# Patient Record
Sex: MALE | Race: WHITE | Employment: OTHER | URBAN - METROPOLITAN AREA
[De-identification: names, ages, dates, MRNs, and addresses within clinical notes are randomized per-mention and may not be internally consistent; named-entity substitution may affect disease eponyms.]

---

## 2017-02-03 PROBLEM — J01.00 ACUTE NON-RECURRENT MAXILLARY SINUSITIS: Status: ACTIVE | Noted: 2017-02-03

## 2017-05-02 ENCOUNTER — HOSPITAL ENCOUNTER (OUTPATIENT)
Dept: ULTRASOUND IMAGING | Age: 72
Discharge: HOME | End: 2017-05-02
Attending: INTERNAL MEDICINE | Admitting: INTERNAL MEDICINE

## 2017-05-02 DIAGNOSIS — F17.219 NICOTINE DEPENDENCE, CIGARETTES, WITH UNSPECIFIED NICOTINE-INDUCED DISORDERS: ICD-10-CM

## 2017-05-02 DIAGNOSIS — F17.219 CIGARETTE NICOTINE DEPENDENCE WITH NICOTINE-INDUCED DISORDER: ICD-10-CM

## 2017-05-04 ENCOUNTER — TELEPHONE (OUTPATIENT)
Dept: CASE MANAGEMENT | Age: 72
End: 2017-05-04

## 2017-06-02 PROBLEM — Z72.0 TOBACCO ABUSE: Status: ACTIVE | Noted: 2017-06-02

## 2017-08-01 PROBLEM — K59.04 CHRONIC IDIOPATHIC CONSTIPATION: Status: ACTIVE | Noted: 2017-08-01

## 2017-11-02 ENCOUNTER — HOSPITAL ENCOUNTER (OUTPATIENT)
Dept: OTHER | Age: 72
Discharge: HOME | End: 2017-11-02
Attending: INTERNAL MEDICINE | Admitting: INTERNAL MEDICINE

## 2017-11-02 PROBLEM — R35.0 BENIGN PROSTATIC HYPERPLASIA WITH URINARY FREQUENCY: Status: ACTIVE | Noted: 2017-11-02

## 2017-11-02 PROBLEM — N40.1 BENIGN PROSTATIC HYPERPLASIA WITH URINARY FREQUENCY: Status: ACTIVE | Noted: 2017-11-02

## 2017-11-02 LAB
A/G RATIO: 1.6 (ref 1.1–2.2)
ALBUMIN SERPL-MCNC: 3.9 G/DL (ref 3.4–5)
ALP BLD-CCNC: 69 U/L (ref 40–129)
ALT SERPL-CCNC: 14 U/L (ref 10–40)
ANION GAP SERPL CALCULATED.3IONS-SCNC: 11 MMOL/L (ref 3–16)
AST SERPL-CCNC: 11 U/L (ref 15–37)
BILIRUB SERPL-MCNC: 0.8 MG/DL (ref 0–1)
BUN BLDV-MCNC: 21 MG/DL (ref 7–20)
CALCIUM SERPL-MCNC: 8.9 MG/DL (ref 8.3–10.6)
CHLORIDE BLD-SCNC: 106 MMOL/L (ref 99–110)
CHOLESTEROL, TOTAL: 173 MG/DL (ref 0–199)
CO2: 25 MMOL/L (ref 21–32)
CREAT SERPL-MCNC: 0.8 MG/DL (ref 0.8–1.3)
GFR AFRICAN AMERICAN: >60
GFR NON-AFRICAN AMERICAN: >60
GLOBULIN: 2.4 G/DL
GLUCOSE BLD-MCNC: 102 MG/DL (ref 70–99)
HCT VFR BLD CALC: 44.7 % (ref 40.5–52.5)
HDLC SERPL-MCNC: 38 MG/DL (ref 40–60)
HEMOGLOBIN: 15 G/DL (ref 13.5–17.5)
LDL CHOLESTEROL CALCULATED: 95 MG/DL
MCH RBC QN AUTO: 31 PG (ref 26–34)
MCHC RBC AUTO-ENTMCNC: 33.6 G/DL (ref 31–36)
MCV RBC AUTO: 92.2 FL (ref 80–100)
PDW BLD-RTO: 13.1 % (ref 12.4–15.4)
PLATELET # BLD: 180 K/UL (ref 135–450)
PMV BLD AUTO: 10.3 FL (ref 5–10.5)
POTASSIUM SERPL-SCNC: 4.5 MMOL/L (ref 3.5–5.1)
PROSTATE SPECIFIC ANTIGEN: 0.61 NG/ML (ref 0–4)
RBC # BLD: 4.84 M/UL (ref 4.2–5.9)
SODIUM BLD-SCNC: 142 MMOL/L (ref 136–145)
TOTAL PROTEIN: 6.3 G/DL (ref 6.4–8.2)
TRIGL SERPL-MCNC: 202 MG/DL (ref 0–150)
VLDLC SERPL CALC-MCNC: 40 MG/DL
WBC # BLD: 12.5 K/UL (ref 4–11)

## 2018-02-06 PROBLEM — N32.81 OVERACTIVE BLADDER: Status: ACTIVE | Noted: 2018-02-06

## 2018-08-07 PROBLEM — F32.9 REACTIVE DEPRESSION: Status: ACTIVE | Noted: 2018-08-07

## 2018-11-02 PROBLEM — F32.4 MAJOR DEPRESSIVE DISORDER WITH SINGLE EPISODE, IN PARTIAL REMISSION (HCC): Status: ACTIVE | Noted: 2018-11-02

## 2019-06-06 ENCOUNTER — HOSPITAL ENCOUNTER (OUTPATIENT)
Dept: MRI IMAGING | Age: 74
Discharge: HOME | End: 2019-06-06
Payer: MEDICARE

## 2019-06-06 DIAGNOSIS — M54.17 LUMBOSACRAL RADICULOPATHY: ICD-10-CM

## 2019-06-06 DIAGNOSIS — M51.35 DDD (DEGENERATIVE DISC DISEASE), THORACOLUMBAR: ICD-10-CM

## 2019-06-06 PROCEDURE — 72148 MRI LUMBAR SPINE W/O DYE: CPT

## 2019-06-11 ENCOUNTER — TELEPHONE (OUTPATIENT)
Dept: INTERNAL MEDICINE CLINIC | Age: 74
End: 2019-06-11

## 2019-06-11 NOTE — TELEPHONE ENCOUNTER
This is a referral to Dr Allen Olvera for Pain management , MRI results faxed already   Dx  Chronic spondyloarthropathy and disc lesions   Please eval and treat  patiant not fond of Narcotics

## 2019-07-16 ENCOUNTER — HOSPITAL ENCOUNTER (OUTPATIENT)
Dept: GENERAL RADIOLOGY | Age: 74
Discharge: HOME | End: 2019-07-16
Payer: MEDICARE

## 2019-07-16 ENCOUNTER — HOSPITAL ENCOUNTER (OUTPATIENT)
Age: 74
Discharge: HOME | End: 2019-07-16
Payer: MEDICARE

## 2019-07-16 DIAGNOSIS — M54.5 LOW BACK PAIN, UNSPECIFIED BACK PAIN LATERALITY, UNSPECIFIED CHRONICITY, WITH SCIATICA PRESENCE UNSPECIFIED: ICD-10-CM

## 2019-07-16 DIAGNOSIS — M48.062 LUMBAR STENOSIS WITH NEUROGENIC CLAUDICATION: ICD-10-CM

## 2019-07-16 PROCEDURE — 72110 X-RAY EXAM L-2 SPINE 4/>VWS: CPT

## 2019-07-18 ENCOUNTER — HOSPITAL ENCOUNTER (OUTPATIENT)
Dept: MRI IMAGING | Age: 74
Discharge: HOME | End: 2019-07-18
Payer: MEDICARE

## 2019-07-18 DIAGNOSIS — M54.6 PAIN IN THORACIC SPINE: ICD-10-CM

## 2019-07-18 DIAGNOSIS — M48.062 LUMBAR STENOSIS WITH NEUROGENIC CLAUDICATION: ICD-10-CM

## 2019-07-18 PROCEDURE — 72146 MRI CHEST SPINE W/O DYE: CPT

## 2020-09-21 ENCOUNTER — HOSPITAL ENCOUNTER (INPATIENT)
Age: 75
LOS: 2 days | Discharge: HOME | DRG: 287 | End: 2020-09-23
Attending: EMERGENCY MEDICINE | Admitting: INTERNAL MEDICINE
Payer: MEDICARE

## 2020-09-21 ENCOUNTER — HOSPITAL ENCOUNTER (OUTPATIENT)
Age: 75
Discharge: HOME | DRG: 287 | End: 2020-09-21
Payer: MEDICARE

## 2020-09-21 ENCOUNTER — APPOINTMENT (OUTPATIENT)
Dept: GENERAL RADIOLOGY | Age: 75
DRG: 287 | End: 2020-09-21
Payer: MEDICARE

## 2020-09-21 PROBLEM — J01.00 ACUTE NON-RECURRENT MAXILLARY SINUSITIS: Status: RESOLVED | Noted: 2017-02-03 | Resolved: 2020-09-21

## 2020-09-21 PROBLEM — I48.91 ATRIAL FIBRILLATION WITH RAPID VENTRICULAR RESPONSE (HCC): Status: ACTIVE | Noted: 2020-09-21

## 2020-09-21 LAB
A/G RATIO: 1.5 (ref 1.1–2.2)
A/G RATIO: 1.9 (ref 1.1–2.2)
ALBUMIN SERPL-MCNC: 3.6 G/DL (ref 3.4–5)
ALBUMIN SERPL-MCNC: 3.9 G/DL (ref 3.4–5)
ALP BLD-CCNC: 78 U/L (ref 40–129)
ALP BLD-CCNC: 81 U/L (ref 40–129)
ALT SERPL-CCNC: 11 U/L (ref 10–40)
ALT SERPL-CCNC: 14 U/L (ref 10–40)
ANION GAP SERPL CALCULATED.3IONS-SCNC: 13 MMOL/L (ref 3–16)
ANION GAP SERPL CALCULATED.3IONS-SCNC: 9 MMOL/L (ref 3–16)
AST SERPL-CCNC: 16 U/L (ref 15–37)
AST SERPL-CCNC: 20 U/L (ref 15–37)
BASOPHILS ABSOLUTE: 0 K/UL (ref 0–0.2)
BASOPHILS RELATIVE PERCENT: 0 %
BILIRUB SERPL-MCNC: 1.1 MG/DL (ref 0–1)
BILIRUB SERPL-MCNC: 1.2 MG/DL (ref 0–1)
BILIRUBIN URINE: NEGATIVE
BLOOD, URINE: ABNORMAL
BUN BLDV-MCNC: 17 MG/DL (ref 7–20)
BUN BLDV-MCNC: 18 MG/DL (ref 7–20)
CALCIUM SERPL-MCNC: 8.8 MG/DL (ref 8.3–10.6)
CALCIUM SERPL-MCNC: 8.9 MG/DL (ref 8.3–10.6)
CHLORIDE BLD-SCNC: 106 MMOL/L (ref 99–110)
CHLORIDE BLD-SCNC: 106 MMOL/L (ref 99–110)
CLARITY: CLEAR
CO2: 21 MMOL/L (ref 21–32)
CO2: 22 MMOL/L (ref 21–32)
COLOR: YELLOW
CREAT SERPL-MCNC: 0.8 MG/DL (ref 0.8–1.3)
CREAT SERPL-MCNC: 0.9 MG/DL (ref 0.8–1.3)
EKG ATRIAL RATE: 144 BPM
EKG ATRIAL RATE: 150 BPM
EKG DIAGNOSIS: NORMAL
EKG DIAGNOSIS: NORMAL
EKG Q-T INTERVAL: 280 MS
EKG Q-T INTERVAL: 312 MS
EKG QRS DURATION: 92 MS
EKG QRS DURATION: 96 MS
EKG QTC CALCULATION (BAZETT): 418 MS
EKG QTC CALCULATION (BAZETT): 492 MS
EKG R AXIS: 10 DEGREES
EKG R AXIS: 9 DEGREES
EKG T AXIS: 15 DEGREES
EKG T AXIS: 39 DEGREES
EKG VENTRICULAR RATE: 134 BPM
EKG VENTRICULAR RATE: 150 BPM
EOSINOPHILS ABSOLUTE: 0 K/UL (ref 0–0.6)
EOSINOPHILS RELATIVE PERCENT: 0 %
EPITHELIAL CELLS, UA: 0 /HPF (ref 0–5)
GFR AFRICAN AMERICAN: >60
GFR AFRICAN AMERICAN: >60
GFR NON-AFRICAN AMERICAN: >60
GFR NON-AFRICAN AMERICAN: >60
GLOBULIN: 2.1 G/DL
GLOBULIN: 2.4 G/DL
GLUCOSE BLD-MCNC: 89 MG/DL (ref 70–99)
GLUCOSE BLD-MCNC: 93 MG/DL (ref 70–99)
GLUCOSE URINE: NEGATIVE MG/DL
HCT VFR BLD CALC: 41.2 % (ref 40.5–52.5)
HCT VFR BLD CALC: 41.4 % (ref 40.5–52.5)
HEMATOLOGY PATH CONSULT: YES
HEMOGLOBIN: 14 G/DL (ref 13.5–17.5)
HEMOGLOBIN: 14 G/DL (ref 13.5–17.5)
HYALINE CASTS: 0 /LPF (ref 0–8)
KETONES, URINE: NEGATIVE MG/DL
LACTIC ACID, SEPSIS: 0.8 MMOL/L (ref 0.4–1.9)
LEUKOCYTE ESTERASE, URINE: ABNORMAL
LYMPHOCYTES ABSOLUTE: 5.6 K/UL (ref 1–5.1)
LYMPHOCYTES RELATIVE PERCENT: 49 %
MCH RBC QN AUTO: 30.5 PG (ref 26–34)
MCH RBC QN AUTO: 30.9 PG (ref 26–34)
MCHC RBC AUTO-ENTMCNC: 33.8 G/DL (ref 31–36)
MCHC RBC AUTO-ENTMCNC: 34 G/DL (ref 31–36)
MCV RBC AUTO: 90.3 FL (ref 80–100)
MCV RBC AUTO: 91.1 FL (ref 80–100)
MICROSCOPIC EXAMINATION: YES
MONOCYTES ABSOLUTE: 1 K/UL (ref 0–1.3)
MONOCYTES RELATIVE PERCENT: 9 %
NEUTROPHILS ABSOLUTE: 4.8 K/UL (ref 1.7–7.7)
NEUTROPHILS RELATIVE PERCENT: 42 %
NITRITE, URINE: NEGATIVE
PDW BLD-RTO: 13.6 % (ref 12.4–15.4)
PDW BLD-RTO: 13.8 % (ref 12.4–15.4)
PH UA: 6.5 (ref 5–8)
PLATELET # BLD: 190 K/UL (ref 135–450)
PLATELET # BLD: 199 K/UL (ref 135–450)
PLATELET SLIDE REVIEW: ADEQUATE
PMV BLD AUTO: 9.1 FL (ref 5–10.5)
PMV BLD AUTO: 9.9 FL (ref 5–10.5)
POTASSIUM REFLEX MAGNESIUM: 4.5 MMOL/L (ref 3.5–5.1)
POTASSIUM SERPL-SCNC: 3.9 MMOL/L (ref 3.5–5.1)
PRO-BNP: 475 PG/ML (ref 0–449)
PROSTATE SPECIFIC ANTIGEN: 0.43 NG/ML (ref 0–4)
PROTEIN UA: NEGATIVE MG/DL
RBC # BLD: 4.53 M/UL (ref 4.2–5.9)
RBC # BLD: 4.59 M/UL (ref 4.2–5.9)
RBC # BLD: NORMAL 10*6/UL
RBC UA: 2 /HPF (ref 0–4)
SLIDE REVIEW: ABNORMAL
SODIUM BLD-SCNC: 137 MMOL/L (ref 136–145)
SODIUM BLD-SCNC: 140 MMOL/L (ref 136–145)
SPECIFIC GRAVITY UA: 1.02 (ref 1–1.03)
TOTAL PROTEIN: 6 G/DL (ref 6.4–8.2)
TOTAL PROTEIN: 6 G/DL (ref 6.4–8.2)
TROPONIN: <0.01 NG/ML
URINE REFLEX TO CULTURE: ABNORMAL
URINE TYPE: ABNORMAL
UROBILINOGEN, URINE: 0.2 E.U./DL
WBC # BLD: 10.7 K/UL (ref 4–11)
WBC # BLD: 11.5 K/UL (ref 4–11)
WBC UA: 2 /HPF (ref 0–5)

## 2020-09-21 PROCEDURE — 83605 ASSAY OF LACTIC ACID: CPT

## 2020-09-21 PROCEDURE — 93010 ELECTROCARDIOGRAM REPORT: CPT | Performed by: INTERNAL MEDICINE

## 2020-09-21 PROCEDURE — 1200000000 HC SEMI PRIVATE

## 2020-09-21 PROCEDURE — 93005 ELECTROCARDIOGRAM TRACING: CPT | Performed by: EMERGENCY MEDICINE

## 2020-09-21 PROCEDURE — 83880 ASSAY OF NATRIURETIC PEPTIDE: CPT

## 2020-09-21 PROCEDURE — 84153 ASSAY OF PSA TOTAL: CPT

## 2020-09-21 PROCEDURE — 6360000002 HC RX W HCPCS: Performed by: INTERNAL MEDICINE

## 2020-09-21 PROCEDURE — 2500000003 HC RX 250 WO HCPCS: Performed by: PHYSICIAN ASSISTANT

## 2020-09-21 PROCEDURE — 80053 COMPREHEN METABOLIC PANEL: CPT

## 2020-09-21 PROCEDURE — 2580000003 HC RX 258: Performed by: PHYSICIAN ASSISTANT

## 2020-09-21 PROCEDURE — 84484 ASSAY OF TROPONIN QUANT: CPT

## 2020-09-21 PROCEDURE — 2580000003 HC RX 258

## 2020-09-21 PROCEDURE — 2500000003 HC RX 250 WO HCPCS

## 2020-09-21 PROCEDURE — 6360000002 HC RX W HCPCS

## 2020-09-21 PROCEDURE — 6370000000 HC RX 637 (ALT 250 FOR IP): Performed by: INTERNAL MEDICINE

## 2020-09-21 PROCEDURE — 99285 EMERGENCY DEPT VISIT HI MDM: CPT

## 2020-09-21 PROCEDURE — 96374 THER/PROPH/DIAG INJ IV PUSH: CPT

## 2020-09-21 PROCEDURE — 2500000003 HC RX 250 WO HCPCS: Performed by: INTERNAL MEDICINE

## 2020-09-21 PROCEDURE — 85025 COMPLETE CBC W/AUTO DIFF WBC: CPT

## 2020-09-21 PROCEDURE — 93005 ELECTROCARDIOGRAM TRACING: CPT

## 2020-09-21 PROCEDURE — 85027 COMPLETE CBC AUTOMATED: CPT

## 2020-09-21 PROCEDURE — 36415 COLL VENOUS BLD VENIPUNCTURE: CPT

## 2020-09-21 PROCEDURE — 2580000003 HC RX 258: Performed by: INTERNAL MEDICINE

## 2020-09-21 PROCEDURE — 71045 X-RAY EXAM CHEST 1 VIEW: CPT

## 2020-09-21 PROCEDURE — 81001 URINALYSIS AUTO W/SCOPE: CPT

## 2020-09-21 RX ORDER — DILTIAZEM HYDROCHLORIDE 5 MG/ML
10 INJECTION INTRAVENOUS ONCE
Status: COMPLETED | OUTPATIENT
Start: 2020-09-21 | End: 2020-09-21

## 2020-09-21 RX ORDER — ATORVASTATIN CALCIUM 10 MG/1
5 TABLET, FILM COATED ORAL NIGHTLY
Status: DISCONTINUED | OUTPATIENT
Start: 2020-09-21 | End: 2020-09-23 | Stop reason: HOSPADM

## 2020-09-21 RX ORDER — DOXAZOSIN MESYLATE 1 MG/1
2 TABLET ORAL DAILY
Status: DISCONTINUED | OUTPATIENT
Start: 2020-09-22 | End: 2020-09-21

## 2020-09-21 RX ORDER — TAMSULOSIN HYDROCHLORIDE 0.4 MG/1
0.4 CAPSULE ORAL DAILY
Status: DISCONTINUED | OUTPATIENT
Start: 2020-09-22 | End: 2020-09-23 | Stop reason: HOSPADM

## 2020-09-21 RX ORDER — ASPIRIN 81 MG/1
81 TABLET ORAL DAILY
COMMUNITY
End: 2020-10-20 | Stop reason: SDUPTHER

## 2020-09-21 RX ORDER — DOXAZOSIN MESYLATE 1 MG/1
2 TABLET ORAL NIGHTLY
Status: DISCONTINUED | OUTPATIENT
Start: 2020-09-21 | End: 2020-09-23 | Stop reason: HOSPADM

## 2020-09-21 RX ORDER — 0.9 % SODIUM CHLORIDE 0.9 %
500 INTRAVENOUS SOLUTION INTRAVENOUS ONCE
Status: COMPLETED | OUTPATIENT
Start: 2020-09-21 | End: 2020-09-21

## 2020-09-21 RX ORDER — GABAPENTIN 300 MG/1
300 CAPSULE ORAL 2 TIMES DAILY
Status: DISCONTINUED | OUTPATIENT
Start: 2020-09-21 | End: 2020-09-23 | Stop reason: HOSPADM

## 2020-09-21 RX ORDER — CITALOPRAM 20 MG/1
40 TABLET ORAL DAILY
Status: DISCONTINUED | OUTPATIENT
Start: 2020-09-22 | End: 2020-09-21

## 2020-09-21 RX ORDER — SODIUM CHLORIDE 9 MG/ML
INJECTION, SOLUTION INTRAVENOUS CONTINUOUS
Status: DISCONTINUED | OUTPATIENT
Start: 2020-09-21 | End: 2020-09-22

## 2020-09-21 RX ORDER — ASPIRIN 81 MG/1
81 TABLET ORAL DAILY
Status: DISCONTINUED | OUTPATIENT
Start: 2020-09-22 | End: 2020-09-23 | Stop reason: HOSPADM

## 2020-09-21 RX ORDER — CITALOPRAM 20 MG/1
40 TABLET ORAL NIGHTLY
Status: DISCONTINUED | OUTPATIENT
Start: 2020-09-21 | End: 2020-09-23 | Stop reason: HOSPADM

## 2020-09-21 RX ORDER — FINASTERIDE 5 MG/1
5 TABLET, FILM COATED ORAL DAILY
Status: DISCONTINUED | OUTPATIENT
Start: 2020-09-22 | End: 2020-09-21

## 2020-09-21 RX ORDER — DILTIAZEM HYDROCHLORIDE 5 MG/ML
10 INJECTION INTRAVENOUS ONCE
Status: DISCONTINUED | OUTPATIENT
Start: 2020-09-21 | End: 2020-09-23 | Stop reason: HOSPADM

## 2020-09-21 RX ORDER — FINASTERIDE 5 MG/1
5 TABLET, FILM COATED ORAL NIGHTLY
Status: DISCONTINUED | OUTPATIENT
Start: 2020-09-21 | End: 2020-09-23 | Stop reason: HOSPADM

## 2020-09-21 RX ADMIN — DILTIAZEM HYDROCHLORIDE 5 MG/HR: 5 INJECTION INTRAVENOUS at 22:47

## 2020-09-21 RX ADMIN — DILTIAZEM HYDROCHLORIDE 10 MG: 5 INJECTION INTRAVENOUS at 15:36

## 2020-09-21 RX ADMIN — ATORVASTATIN CALCIUM 5 MG: 10 TABLET, FILM COATED ORAL at 22:41

## 2020-09-21 RX ADMIN — FINASTERIDE 5 MG: 5 TABLET, FILM COATED ORAL at 23:28

## 2020-09-21 RX ADMIN — DOXAZOSIN 2 MG: 1 TABLET ORAL at 23:29

## 2020-09-21 RX ADMIN — GABAPENTIN 300 MG: 300 CAPSULE ORAL at 22:40

## 2020-09-21 RX ADMIN — ENOXAPARIN SODIUM 80 MG: 80 INJECTION SUBCUTANEOUS at 22:41

## 2020-09-21 RX ADMIN — CITALOPRAM HYDROBROMIDE 40 MG: 20 TABLET ORAL at 23:28

## 2020-09-21 RX ADMIN — DILTIAZEM HYDROCHLORIDE 5 MG/HR: 5 INJECTION INTRAVENOUS at 16:40

## 2020-09-21 RX ADMIN — SODIUM CHLORIDE 500 ML: 9 INJECTION, SOLUTION INTRAVENOUS at 16:43

## 2020-09-21 RX ADMIN — SODIUM CHLORIDE: 9 INJECTION, SOLUTION INTRAVENOUS at 22:41

## 2020-09-21 ASSESSMENT — ENCOUNTER SYMPTOMS
SHORTNESS OF BREATH: 0
VOMITING: 0
NAUSEA: 0
ABDOMINAL PAIN: 0
DIARRHEA: 0
CHEST TIGHTNESS: 0

## 2020-09-21 ASSESSMENT — PAIN SCALES - GENERAL
PAINLEVEL_OUTOF10: 0

## 2020-09-21 NOTE — ED NOTES
Pt alert and oriented, Pt to ER with c/o possible new onset AFIB RVR, pt states has been dealing with his for approx a month, states has been under a lot of stress with home life, and has had a couple urinary surgeries. heart rate irregular, S1, S2 heard. Cap refill less than three seconds, radial pulse 2+, no signs of edema or JVD and lungs sounds clear. Pt denies pain at this time, states \"I just feel a pressure on my chest.\" denies SOB. Pt denies cardiac hx.  Pt denies any other problems or needs at this time     Conrado Ames, RN  09/21/20 2468

## 2020-09-21 NOTE — ED NOTES
Pt states still having periods of CP, states comes and goes. States does not feel like heart is racing anymore. Patient resting comfortably with no signs of distress. Denies any needs at this time. Bed locked and in lowest position with both side rails raised. Call light within reach.      Iman Alarcon RN  09/21/20 1565

## 2020-09-21 NOTE — ED NOTES
IV inserted. Blood collected. Pt given urinal. Patient resting comfortably with no signs of distress. Denies any needs at this time. Bed locked and in lowest position with both side rails raised. Call light within reach. Friend at bedside.      Luli Cole RN  09/21/20 4756

## 2020-09-21 NOTE — ED NOTES
Pt medicated per orders. Updated on plan of care by Dr Nury Spain at bedside. Patient resting comfortably with no signs of distress. Denies any needs at this time. Bed locked and in lowest position with both side rails raised. Call light within reach.  Patient brought warm blanket at patient's request.     Melissa Judd RN  09/21/20 6688

## 2020-09-21 NOTE — ED NOTES
pts BP high 56'F low 485'U systolic. teodora Mcwilliams requesting dilt drip started. States will place order for 500ml bolus momentarily.       Travis Verdugo RN  09/21/20 9321

## 2020-09-21 NOTE — ED PROVIDER NOTES
ago.  He denies abdominal or flank pain. Denies dysuria, urgency, frequency, hesitancy or incontinence. Denies fevers and or chills. Denies nausea vomiting or diarrhea. Patient has no other associated complaint voiced at the present time. It is with the above-mentioned he sent by Dr. Christine Edward for evaluation and treatment. Nursing Notes were all reviewed and agreed with or any disagreements were addressed in the HPI. REVIEW OF SYSTEMS    (2-9 systems for level 4, 10 or more for level 5)     Review of Systems   Constitutional: Negative for activity change, chills and fever. Respiratory: Negative for chest tightness and shortness of breath. Cardiovascular: Negative for chest pain. Gastrointestinal: Negative for abdominal pain, diarrhea, nausea and vomiting. Genitourinary: Negative for dysuria and flank pain. Skin: Negative for rash and wound. Neurological: Positive for weakness. Positives and Pertinent negatives as per HPI. Except as noted above in the ROS, all other systems were reviewed and negative. PAST MEDICAL HISTORY     Past Medical History:   Diagnosis Date    Hypercholesteremia     Hypertension          SURGICAL HISTORY     Past Surgical History:   Procedure Laterality Date    HEMORRHOID SURGERY      SHOULDER SURGERY Right     RTC          CURRENTMEDICATIONS       Previous Medications    ATORVASTATIN (LIPITOR) 10 MG TABLET    Take 0.5 tablets by mouth daily    CITALOPRAM (CELEXA) 40 MG TABLET    Take 1 tablet by mouth daily    DOXAZOSIN (CARDURA) 2 MG TABLET    Take 1 tablet by mouth daily    FINASTERIDE (PROSCAR) 5 MG TABLET    Take 1 tablet by mouth daily    GABAPENTIN (NEURONTIN) 300 MG CAPSULE    Take 1 capsule by mouth 2 times daily for 90 days.     IBUPROFEN (ADVIL;MOTRIN) 600 MG TABLET    Take 1 tablet by mouth every 6 hours as needed for Pain    PSEUDOEPHEDRINE-GUAIFENESIN (MUCINEX D MAX STRENGTH) 120-1200 MG TB12    Take 1 tablet by mouth 2 times daily TAMSULOSIN (FLOMAX) 0.4 MG CAPSULE    Take 1 capsule by mouth daily         ALLERGIES     Shellfish allergy and Augmentin [amoxicillin-pot clavulanate]    FAMILYHISTORY       Family History   Problem Relation Age of Onset    Cancer Other     High Blood Pressure Other     Stroke Other           SOCIAL HISTORY       Social History     Tobacco Use    Smoking status: Current Every Day Smoker     Packs/day: 1.00     Types: Cigarettes    Smokeless tobacco: Former User     Quit date: 1/1/2011   Substance Use Topics    Alcohol use: No    Drug use: No       SCREENINGS             PHYSICAL EXAM    (up to 7 for level 4, 8 or more for level 5)     ED Triage Vitals [09/21/20 1435]   BP Temp Temp src Pulse Resp SpO2 Height Weight   113/74 97.6 °F (36.4 °C) -- 100 16 96 % 5' 9\" (1.753 m) 184 lb (83.5 kg)       Physical Exam  Vitals signs and nursing note reviewed. Constitutional:       General: He is awake. He is not in acute distress. Appearance: Normal appearance. He is well-developed. He is not ill-appearing or diaphoretic. HENT:      Head: Normocephalic and atraumatic. Right Ear: External ear normal.      Left Ear: External ear normal.   Eyes:      General: No scleral icterus. Right eye: No discharge. Left eye: No discharge. Conjunctiva/sclera: Conjunctivae normal.   Neck:      Musculoskeletal: Normal range of motion. Vascular: No JVD. Cardiovascular:      Rate and Rhythm: Tachycardia present. Rhythm irregularly irregular. Heart sounds: No murmur. No friction rub. No gallop. Pulmonary:      Effort: Pulmonary effort is normal. No accessory muscle usage or respiratory distress. Breath sounds: Normal breath sounds. No wheezing, rhonchi or rales. Abdominal:      General: There is no distension. Palpations: Abdomen is soft. Abdomen is not rigid. There is no mass. Tenderness: There is no abdominal tenderness. There is no guarding or rebound.    Skin:     General: Laboratory  [address] [facility], [address]   Phone [phone]   MICROSCOPIC URINALYSIS    Narrative:     Performed at:  [facility]  [address] [facility], [address]   Phone [phone]       All other labs were within normal range or not returned as of this dictation. EKG: All EKG's are interpreted by the Emergency Department Physician in the absence of a cardiologist.  Please see their note for interpretation of EKG. RADIOLOGY:   Non-plain film images such as CT, Ultrasound and MRI are read by the radiologist. Plain radiographic images are visualized and preliminarily interpreted by the ED Provider with the below findings:        Interpretation per the Radiologist below, if available at the time of this note:    XR CHEST PORTABLE   Final Result   No evidence of acute cardiopulmonary disease. PROCEDURES   Unless otherwise noted below, none     Procedures    CRITICAL CARE TIME   Because of the high probability of sudden clinical deterioration of the patients condition and to prevent further deterioration, my critical care time involved my full attention to the patients condition, and included chart data review, documentation, medication ordering, viewing the patients old records, reevaluation of the patient's cardiac, pulmonary, and neurological status. Reassessing vital signs. Consutlations with off service physician. Ordering, interpreting reviewing diagnostic testing. Therefore my critical care time was 38 minutes of direct attention to the patients condition and did not include time spent on procedures.       CONSULTS:  IP CONSULT TO INTERNAL MEDICINE      EMERGENCY DEPARTMENT COURSE and DIFFERENTIAL DIAGNOSIS/MDM:   Vitals:    Vitals:    09/21/20 1610 09/21/20 1620 09/21/20 1630 09/21/20 1640   BP: 115/72 121/70 99/84 118/80   Pulse: 123 109 115 136   Resp: 26 23 23 23   Temp:       SpO2: 94% 95% 95% 95%   Weight:       Height: Patient was given the following medications:  Medications   dilTIAZem injection 10 mg (10 mg Intravenous Given 9/21/20 1536)     Followed by   dilTIAZem 125 mg in dextrose 5 % 125 mL infusion (5 mg/hr Intravenous New Bag 9/21/20 1640)   0.9 % sodium chloride bolus (500 mLs Intravenous New Bag 9/21/20 1643)           The patient's detailed history of present illness is documented as above. Upon arrival to the emergency department the patient's vital signs are as documented. The patient is noted to be hemodynamically stable and afebrile. Physical examination findings are as above. IV access was obtained. Laboratory testing and work-up was initiated. Initial EKG performed upon arrival demonstrates atrial fibrillation with a rapid ventricular response with a rate of 134. In the examination room he is bouncing as high as 160s. Cardizem bolus and drip was initiated. Heart rate was trending down nicely. Laboratory testing and work-up was indicated. CBC demonstrates a white blood cell count of 11.5 with no evidence of anemia and/or thrombocytopenia. UN is 17 creatinine is 0.8 nonfasting glucose 89 electrolytes and LFTs are as documented. Total bili at 1.1.  UA demonstrates no evidence of infection. Troponin less than 0.01.  proBNP is 475. Chest x-ray demonstrates no evidence of acute cardiopulmonary process. Patient did have one relatively low blood pressure reading here when the Cardizem drip was initiated. 500 cc bolus had his blood pressure returned normal and nicely. In light of the above mention the patient will require ongoing care management on an inpatient basis. Case was discussed directly with Dr. Lay Drake who accepts the patient for admission for ongoing care management the diagnoses below. FINAL IMPRESSION      1.  Atrial fibrillation with rapid ventricular response (HCC)          DISPOSITION/PLAN   DISPOSITION: Admit medical stable condition         (Please note that portions of this note were completed with a voice recognition program.  Efforts were made to edit the dictations but occasionally words are mis-transcribed.)    Tennie Bernheim, PA-C (electronically signed)           Jany Shultz PA-C  09/21/20 3792

## 2020-09-21 NOTE — ED PROVIDER NOTES
I independently performed a history and physical on Gabriel Baig. All diagnostic, treatment, and disposition decisions were made by myself in conjunction with the advanced practice provider. I have participated in the medical decision making and directed the treatment plan and disposition of the patient. For further details of [facility] emergency department encounter, please see the advanced practice provider's documentation. CHIEF COMPLAINT  Chief Complaint   Patient presents with    Tachycardia     Pt recently had a urology procedure done and they noted bradycardia. Followed up with his PCP and they took him off his metoprolol and now his HR has been high. [place] [place] and EKG showed afib with RVR and sent him here. Briefly, Gabriel Baig is a 77 y.o. male  who presents to the ED complaining of being sent in for rapid heart rate after recently discontinuing metoprolol due to bradycardia during a urologic procedure. No fever. He does have some chest pressure/palpitations sensation. No syncope. Not short of breath at this time. FOCUSED PHYSICAL EXAMINATION  /70   Pulse 113   Temp 97.6 °F (36.4 °C)   Resp 24   Ht 5' 9\" (1.753 m)   Wt 184 lb (83.5 kg)   SpO2 94%   BMI 27.17 kg/m²    Focused physical examination notable for no acute distress, well-appearing, well-nourished, normal speech and mentation without obvious facial droop, no obvious rash. No obvious cranial nerve deficits on my initial exam.  Tachycardic, irregularly irregular rhythm, clear to auscultation bilaterally. No leg swelling.     The 12 lead EKG was interpreted by me as follows:  Rate: tachycardia with a rate of 134  Rhythm: sinus  Axis: normal  Intervals: narrow QRS  ST segments: no ST elevations or depressions  T waves: no abnormal inversions  Non-specific T wave changes: present  Prior EKG comparison: No prior is currently available for comparison    MDM:  ED course was notable for new onset of

## 2020-09-22 LAB
ANION GAP SERPL CALCULATED.3IONS-SCNC: 6 MMOL/L (ref 3–16)
BUN BLDV-MCNC: 15 MG/DL (ref 7–20)
CALCIUM SERPL-MCNC: 8.4 MG/DL (ref 8.3–10.6)
CHLORIDE BLD-SCNC: 108 MMOL/L (ref 99–110)
CO2: 25 MMOL/L (ref 21–32)
CREAT SERPL-MCNC: 0.9 MG/DL (ref 0.8–1.3)
GFR AFRICAN AMERICAN: >60
GFR NON-AFRICAN AMERICAN: >60
GLUCOSE BLD-MCNC: 97 MG/DL (ref 70–99)
HEMATOLOGY PATH CONSULT: NORMAL
INR BLD: 0.99 (ref 0.86–1.14)
LEFT VENTRICULAR EJECTION FRACTION HIGH VALUE: 55 %
LEFT VENTRICULAR EJECTION FRACTION MODE: NORMAL
LEFT VENTRICULAR EJECTION FRACTION MODE: NORMAL
LV EF: 47 %
LV EF: 50 %
LV EF: 55 %
LVEF MODALITY: NORMAL
LVEF MODALITY: NORMAL
POTASSIUM SERPL-SCNC: 4.4 MMOL/L (ref 3.5–5.1)
PROTHROMBIN TIME: 11.5 SEC (ref 10–13.2)
SODIUM BLD-SCNC: 139 MMOL/L (ref 136–145)
TROPONIN: <0.01 NG/ML
TSH REFLEX: 0.97 UIU/ML (ref 0.27–4.2)

## 2020-09-22 PROCEDURE — 93458 L HRT ARTERY/VENTRICLE ANGIO: CPT | Performed by: INTERNAL MEDICINE

## 2020-09-22 PROCEDURE — 4A023N7 MEASUREMENT OF CARDIAC SAMPLING AND PRESSURE, LEFT HEART, PERCUTANEOUS APPROACH: ICD-10-PCS | Performed by: INTERNAL MEDICINE

## 2020-09-22 PROCEDURE — A9502 TC99M TETROFOSMIN: HCPCS | Performed by: INTERNAL MEDICINE

## 2020-09-22 PROCEDURE — 80048 BASIC METABOLIC PNL TOTAL CA: CPT

## 2020-09-22 PROCEDURE — 99153 MOD SED SAME PHYS/QHP EA: CPT

## 2020-09-22 PROCEDURE — 6360000002 HC RX W HCPCS: Performed by: INTERNAL MEDICINE

## 2020-09-22 PROCEDURE — 6360000004 HC RX CONTRAST MEDICATION: Performed by: INTERNAL MEDICINE

## 2020-09-22 PROCEDURE — 85610 PROTHROMBIN TIME: CPT

## 2020-09-22 PROCEDURE — 6370000000 HC RX 637 (ALT 250 FOR IP): Performed by: INTERNAL MEDICINE

## 2020-09-22 PROCEDURE — B2111ZZ FLUOROSCOPY OF MULTIPLE CORONARY ARTERIES USING LOW OSMOLAR CONTRAST: ICD-10-PCS | Performed by: INTERNAL MEDICINE

## 2020-09-22 PROCEDURE — 99222 1ST HOSP IP/OBS MODERATE 55: CPT | Performed by: INTERNAL MEDICINE

## 2020-09-22 PROCEDURE — 94760 N-INVAS EAR/PLS OXIMETRY 1: CPT

## 2020-09-22 PROCEDURE — 84484 ASSAY OF TROPONIN QUANT: CPT

## 2020-09-22 PROCEDURE — 3430000000 HC RX DIAGNOSTIC RADIOPHARMACEUTICAL: Performed by: INTERNAL MEDICINE

## 2020-09-22 PROCEDURE — 99152 MOD SED SAME PHYS/QHP 5/>YRS: CPT

## 2020-09-22 PROCEDURE — C1894 INTRO/SHEATH, NON-LASER: HCPCS

## 2020-09-22 PROCEDURE — C1769 GUIDE WIRE: HCPCS

## 2020-09-22 PROCEDURE — 93458 L HRT ARTERY/VENTRICLE ANGIO: CPT

## 2020-09-22 PROCEDURE — 1200000000 HC SEMI PRIVATE

## 2020-09-22 PROCEDURE — 36415 COLL VENOUS BLD VENIPUNCTURE: CPT

## 2020-09-22 PROCEDURE — B2151ZZ FLUOROSCOPY OF LEFT HEART USING LOW OSMOLAR CONTRAST: ICD-10-PCS | Performed by: INTERNAL MEDICINE

## 2020-09-22 PROCEDURE — 93306 TTE W/DOPPLER COMPLETE: CPT

## 2020-09-22 PROCEDURE — 84443 ASSAY THYROID STIM HORMONE: CPT

## 2020-09-22 PROCEDURE — 93017 CV STRESS TEST TRACING ONLY: CPT | Performed by: INTERNAL MEDICINE

## 2020-09-22 PROCEDURE — 2580000003 HC RX 258: Performed by: INTERNAL MEDICINE

## 2020-09-22 PROCEDURE — 2709999900 HC NON-CHARGEABLE SUPPLY

## 2020-09-22 PROCEDURE — 78452 HT MUSCLE IMAGE SPECT MULT: CPT | Performed by: INTERNAL MEDICINE

## 2020-09-22 RX ORDER — SODIUM CHLORIDE 9 MG/ML
INJECTION, SOLUTION INTRAVENOUS CONTINUOUS
Status: ACTIVE | OUTPATIENT
Start: 2020-09-22 | End: 2020-09-23

## 2020-09-22 RX ORDER — ONDANSETRON 2 MG/ML
4 INJECTION INTRAMUSCULAR; INTRAVENOUS EVERY 6 HOURS PRN
Status: DISCONTINUED | OUTPATIENT
Start: 2020-09-22 | End: 2020-09-23 | Stop reason: HOSPADM

## 2020-09-22 RX ORDER — ACETAMINOPHEN 325 MG/1
650 TABLET ORAL EVERY 4 HOURS PRN
Status: DISCONTINUED | OUTPATIENT
Start: 2020-09-22 | End: 2020-09-23 | Stop reason: HOSPADM

## 2020-09-22 RX ORDER — OXYCODONE HYDROCHLORIDE AND ACETAMINOPHEN 5; 325 MG/1; MG/1
1 TABLET ORAL EVERY 4 HOURS PRN
Status: DISCONTINUED | OUTPATIENT
Start: 2020-09-22 | End: 2020-09-23 | Stop reason: HOSPADM

## 2020-09-22 RX ORDER — WARFARIN SODIUM 5 MG/1
5 TABLET ORAL DAILY
Status: DISCONTINUED | OUTPATIENT
Start: 2020-09-22 | End: 2020-09-23 | Stop reason: HOSPADM

## 2020-09-22 RX ORDER — SODIUM CHLORIDE 0.9 % (FLUSH) 0.9 %
10 SYRINGE (ML) INJECTION PRN
Status: DISCONTINUED | OUTPATIENT
Start: 2020-09-22 | End: 2020-09-23 | Stop reason: HOSPADM

## 2020-09-22 RX ORDER — OXYCODONE HYDROCHLORIDE AND ACETAMINOPHEN 5; 325 MG/1; MG/1
2 TABLET ORAL EVERY 4 HOURS PRN
Status: DISCONTINUED | OUTPATIENT
Start: 2020-09-22 | End: 2020-09-23 | Stop reason: HOSPADM

## 2020-09-22 RX ORDER — AMINOPHYLLINE DIHYDRATE 25 MG/ML
100 INJECTION, SOLUTION INTRAVENOUS ONCE
Status: COMPLETED | OUTPATIENT
Start: 2020-09-22 | End: 2020-09-22

## 2020-09-22 RX ORDER — SODIUM CHLORIDE 0.9 % (FLUSH) 0.9 %
10 SYRINGE (ML) INJECTION EVERY 12 HOURS SCHEDULED
Status: DISCONTINUED | OUTPATIENT
Start: 2020-09-22 | End: 2020-09-23 | Stop reason: HOSPADM

## 2020-09-22 RX ADMIN — REGADENOSON 0.4 MG: 0.08 INJECTION, SOLUTION INTRAVENOUS at 09:58

## 2020-09-22 RX ADMIN — WARFARIN SODIUM 5 MG: 5 TABLET ORAL at 20:31

## 2020-09-22 RX ADMIN — TETROFOSMIN 30 MILLICURIE: 1.38 INJECTION, POWDER, LYOPHILIZED, FOR SOLUTION INTRAVENOUS at 09:58

## 2020-09-22 RX ADMIN — DOXAZOSIN 2 MG: 1 TABLET ORAL at 20:32

## 2020-09-22 RX ADMIN — TETROFOSMIN 10 MILLICURIE: 1.38 INJECTION, POWDER, LYOPHILIZED, FOR SOLUTION INTRAVENOUS at 08:41

## 2020-09-22 RX ADMIN — IOPAMIDOL 75 ML: 755 INJECTION, SOLUTION INTRAVENOUS at 16:53

## 2020-09-22 RX ADMIN — SODIUM CHLORIDE: 9 INJECTION, SOLUTION INTRAVENOUS at 17:22

## 2020-09-22 RX ADMIN — GABAPENTIN 300 MG: 300 CAPSULE ORAL at 20:31

## 2020-09-22 RX ADMIN — METOPROLOL TARTRATE 12.5 MG: 25 TABLET, FILM COATED ORAL at 20:32

## 2020-09-22 RX ADMIN — CITALOPRAM HYDROBROMIDE 40 MG: 20 TABLET ORAL at 20:32

## 2020-09-22 RX ADMIN — ATORVASTATIN CALCIUM 5 MG: 10 TABLET, FILM COATED ORAL at 20:32

## 2020-09-22 RX ADMIN — FINASTERIDE 5 MG: 5 TABLET, FILM COATED ORAL at 20:31

## 2020-09-22 RX ADMIN — ASPIRIN 81 MG: 81 TABLET, COATED ORAL at 08:10

## 2020-09-22 RX ADMIN — AMINOPHYLLINE 100 MG: 25 INJECTION, SOLUTION INTRAVENOUS at 10:11

## 2020-09-22 RX ADMIN — METOPROLOL TARTRATE 12.5 MG: 25 TABLET, FILM COATED ORAL at 11:08

## 2020-09-22 RX ADMIN — ACETAMINOPHEN 650 MG: 325 TABLET ORAL at 11:28

## 2020-09-22 RX ADMIN — TAMSULOSIN HYDROCHLORIDE 0.4 MG: 0.4 CAPSULE ORAL at 08:10

## 2020-09-22 RX ADMIN — GABAPENTIN 300 MG: 300 CAPSULE ORAL at 08:10

## 2020-09-22 RX ADMIN — Medication 10 ML: at 20:33

## 2020-09-22 ASSESSMENT — PAIN SCALES - GENERAL
PAINLEVEL_OUTOF10: 0
PAINLEVEL_OUTOF10: 0
PAINLEVEL_OUTOF10: 2
PAINLEVEL_OUTOF10: 0
PAINLEVEL_OUTOF10: 3
PAINLEVEL_OUTOF10: 0

## 2020-09-22 ASSESSMENT — PAIN DESCRIPTION - ORIENTATION: ORIENTATION: MID

## 2020-09-22 ASSESSMENT — PAIN DESCRIPTION - PAIN TYPE
TYPE: ACUTE PAIN
TYPE: ACUTE PAIN

## 2020-09-22 ASSESSMENT — PAIN DESCRIPTION - DESCRIPTORS
DESCRIPTORS: HEADACHE
DESCRIPTORS: DULL

## 2020-09-22 ASSESSMENT — PAIN DESCRIPTION - LOCATION
LOCATION: HEAD
LOCATION: CHEST

## 2020-09-22 NOTE — PROGRESS NOTES
Pt returned to floor from cath lab. Rt radial cath site CDI, 3+ radial pulse. TR band in place. Pt instructed on limitations to RUE.

## 2020-09-22 NOTE — ED NOTES
Pt seen on monitor by [facility], Monitor tech in NSR. Pt name and box number confirmed. Report given to Inés Slade. Pt alert and oriented and shows no signs of distress at time of transfer to [place] [id number]. Cardizem gtt infusing at time of transport. This RN [place].          Nikolay Castro RN  09/21/20 9507

## 2020-09-22 NOTE — PROGRESS NOTES
Clinical Pharmacy Note: Pharmacy to Dose Warfarin    Pharmacy consulted by Dr. Vella Romberg to dose warfarin. Yesika Vail is a 77 y.o. male  is receiving warfarin for indication: Afib. INR Goal Range: 2-3  Prior to admission warfarin dosing regimen: N/A- new start  INR today: pending    Assessment/Plan:  Based on today's assessment, dose warfarin starting at 5 mg daily  Daily INR is ordered. Pharmacy will continue to monitor and make adjustments to regimen as necessary.      Thank you for the consult,     Hussein HernandezD  Clinical Pharmacist [id number]  9/22/2020

## 2020-09-22 NOTE — PROGRESS NOTES
Pt HR dropped to 30s-50s. Woke pt and took BP, SBP in 80s and 90s, HR in 70s once awake. MD made aware and cardizem gtt DC'd. Will monitor.

## 2020-09-22 NOTE — H&P
[facility]                     [facility], [address]                              HISTORY AND PHYSICAL    PATIENT NAME: Yobany Quiroga                       :        1945  MED REC NO:   [id number]                          ROOM:       6767  ACCOUNT NO:   [de-identified]                           ADMIT DATE: 2020  PROVIDER:     Jillian Stubbs MD    HISTORY OF PRESENT ILLNESS:  The patient is a 67-year-old white man, who  came to the emergency room with history of palpitation. The patient was  sent in from the EKG. The patient had an outpatient EKG ordered by me  because the patient was recently found to be having bradycardia, during  a prostatic surgery in response to which I had actually held his eight  metoprolol. When the EKG technician called me, the patient was found to  be having atrial fibrillation with rapid ventricular response and on my  instruction, they sent the patient to the emergency room. The patient  had some palpitation and slight dizziness with associated shortness of  breath. PAST MEDICAL HISTORY:  Pertinent for hypertension, benign prostatic  hypertrophy. The patient also has history of chronic lumbosacral  radicular pain, hypertension and hypercholesterolemia. PAST SURGICAL HISTORY:  Pertinent for hemorrhoidectomy, shoulder  surgery, recent prostatic surgery. MEDICATIONS:  Atorvastatin, citalopram, doxazosin, finasteride,  gabapentin, ibuprofen, pseudoephedrine, Mucinex and tamsulosin. ALLERGIES:  The patient is allergic to SHELLFISH and AUGMENTIN. FAMILY HISTORY:  Both his parents are . Cancer, high blood  pressure and stroke run in his family. SOCIAL HISTORY:  He is a  but  man. He lives with  another domestic partner lady, who is also disabled. He has two grown  children. He smokes a pack a day, history of occasional alcohol usage. No history of substance abuse.   The patient 23:33:56     [id number]  Job#: [id number]     Doc#: [id number]    CC:

## 2020-09-22 NOTE — OP NOTE
Patient:  Dipak Salinas   :   1945    Procedural Summary  ~Consent:   Obtained written and verbal consent      Risks/benefits explained in detail  ~Procedure:    Left Heart Catheterization  ~Medications:    Procedural sedation with minimal conscious sedation  ~Complications:   None  ~Blood Loss:    <10cc  ~Specimens:    None obtained  ~Pre-sedation re-evaluation: Performed immediately prior to procedure. Medication and Procedural Reconciliation:  An independent trained observer pushed medications at my direction. We monitored the patient's level of consciousness and vital signs/physiologic status throughout the procedure duration (see start and stop times below). Sedation: 2 mg Versed, 50 mcg Fentanyl  Sedation start: 162  Sedation stop: 164    Cardiac Cath LVG:  Anatomy:   LM-NML   LAD-mid 20%  Cx-nml  OM- nml  RCA-Dominant mid 20%  RPDA- nml  LVEF- 50%  LVG- nml  LVEDP- 10    Contrast: 75  Flouro Time: 2.4  Access: R radial    Impression  ~Coronary Angiography w/ Nonobstructive CAD  ~LVG with LVEF of 50 and no regional wall motion abnormalities        Recommendation  ~Aggressive medical treatment and risk factor modification  ~1. Medications reviewed, start coumadin  2. Stop lovenox. Post cath IVF. Bedrest.  3. No indication for anti platelet therapy  4. Patient has been advised on the importance of regular exercise of at least 20-30 minutes daily alternating with aerobic and isometric activities. 5. Patient counseled about and offered assistance for smoking cessation   6. No indication for cardiac rehab  7. Follow up in 2 weeks with cardiology. Observe overnight, discharge home tomorrow.             Jessica Palencia MD 2020 4:51 PM

## 2020-09-22 NOTE — PLAN OF CARE
Problem: Cardiovascular  Goal: Hemodynamic stability  9/22/2020 0921 by Brunetta Dakin, DYLAN  Outcome: Ongoing  Note: VSS. C/o dull chest pain, denies need for medication at this time. Discussed POC - stress test today. Problem: Respiratory  Goal: O2 Sat > 90%  9/22/2020 0921 by Brunetta Dakin, DYLAN  Outcome: Ongoing  Note: SpO2 93% on RA with activity. Denies SOB at this time.

## 2020-09-22 NOTE — CONSULTS
- abdominal pain, diarrhea, N/V  · Hematology: negative for - bleeding, blood clots, bruising or jaundice  · Genito-Urinary:  negative for - Dysuria or incontinence  · Musculoskeletal: negative for - Joint swelling, muscle pain  · Neurological: negative for - confusion, dizziness, headaches   · Psychiatric: No anxiety, no depression. · Dermatological: negative for - rash    Physical Examination:  Vitals:    20 0806   BP: 137/63   Pulse: 60   Resp: 16   Temp: 97.3 °F (36.3 °C)   SpO2: 94%      In: -   Out: 200    Wt Readings from Last 3 Encounters:   20 184 lb (83.5 kg)   20 184 lb (83.5 kg)   20 190 lb (86.2 kg)     Temp  Av.9 °F (36.6 °C)  Min: 97.3 °F (36.3 °C)  Max: 98.3 °F (36.8 °C)  Pulse  Av.7  Min: 37  Max: 141  BP  Min: 83/35  Max: 137/63  SpO2  Av.1 %  Min: 89 %  Max: 97 %    Intake/Output Summary (Last 24 hours) at 2020 1029  Last data filed at 2020 0441  Gross per 24 hour   Intake --   Output 200 ml   Net -200 ml       · Telemetry: Sinus rhythm , Atrial fibrillation   · Constitutional: Oriented. No distress. · Head: Normocephalic and atraumatic. · Mouth/Throat: Oropharynx is clear and moist.   · Eyes: Conjunctivae normal. EOM are normal.   · Neck: Neck supple. No rigidity. No JVD present. · Cardiovascular: Normal rate, regular rhythm, S1&S2. · Pulmonary/Chest: Bilateral respiratory sounds. No wheezes, No rhonchi. · Abdominal: Soft. Bowel sounds present. No distension, No tenderness. · Musculoskeletal: No tenderness. No edema    · Lymphadenopathy: Has no cervical adenopathy. · Neurological: Alert and oriented. Cranial nerve appears intact, No Gross deficit   · Skin: Skin is warm and dry. No rash noted. · Psychiatric: Has a normal behavior     Labs, diagnostic and imaging results reviewed. Reviewed.    Recent Labs     20  1400 20  1535 20  0617    137 139   K 3.9 4.5 4.4    106 108   CO2 21 22 25   BUN 18 diagnosis   [id number] score 2 and needs to be on anticoagulation . Will start PO after stress test result. Check TSH, echo and stress test   Will start low dose beta-blocker . MCOT to assess frequency. Will refer to sleep study as outpatient  Afib risk factors including age, HTN, obesity, inactivity and GORDON were discussed with patient. Risk factor modification recommended. All questions were answered. - Treatment options including cardioversion, rate control strategy, anticoagulation were discussed with patient. Risks, benefits and alternative of each treatment options were explained. All questions answered. Depending on frequency , antiarrhythmic drugs vs ablation can be considered. Due to baseline yuliet, meds are limited. - HTN     BP is well controlled. Continue current meds. Thank you for allowing me to participate in the care of Wisam Bobo     NOTE: This report was transcribed using voice recognition software. Every effort was made to ensure accuracy, however, inadvertent computerized transcription errors may be present.

## 2020-09-22 NOTE — PROGRESS NOTES
Pt back to room from stress lab. Tele shows 's afib. Dr. Rolin Merlin notified via perfect serve. PO dose metoprolol given as ordered.

## 2020-09-22 NOTE — PROGRESS NOTES
4 Eyes Skin Assessment     The patient is being assess for  Admission    I agree that 2 RN's have performed a thorough Head to Toe Skin Assessment on the patient. ALL assessment sites listed below have been assessed. Areas assessed by both nurses:   [x]   Head, Face, and Ears   [x]   Shoulders, Back, and Chest  [x]   Arms, Elbows, and Hands   [x]   Coccyx, Sacrum, and IschIum  [x]   Legs, Feet, and Heels        Does the Patient have Skin Breakdown?   No         Eugenio Prevention initiated:  No   Wound Care Orders initiated:  NA      [facility] nurse consulted for Pressure Injury (Stage 3,4, Unstageable, DTI, NWPT, and Complex wounds), New and Established Ostomies:  NA      Nurse 1 eSignature: Electronically signed by Chad Price RN on 9/22/20 at 6:00 AM EDT      Nurse 2 eSignature: Electronically signed by Sami Murillo RN on 9/22/20 at 6:00 AM EDT

## 2020-09-22 NOTE — PROGRESS NOTES
Pt admitted to room 3311 from ED. VSS. Pt a&oX4. POC updated with pt, all questions answered. Oriented pt to room and call light. Call light and bedside table within reach. Instructed to call out with any needs, v/u. Will monitor.

## 2020-09-22 NOTE — PLAN OF CARE
Problem: Falls - Risk of:  Goal: Will remain free from falls  Description: Will remain free from falls  Outcome: Met This Shift  Note: Pt is wearing yellow nonskid socks. Bed is in lowest position, locked, side rails up 2/4, and call light is within reach. Pt informed of fall risks, verbalizes understanding. Will monitor. Problem: Cardiovascular  Goal: No DVT, peripheral vascular complications  Outcome: Met This Shift  Note: Pt on SQ Lovenox. Problem: Respiratory  Goal: O2 Sat > 90%  Outcome: Met This Shift     Problem: GI  Goal: Bowel movement at least every other day  Outcome: Met This Shift     Problem: Skin Integrity/Risk  Goal: No skin breakdown during hospitalization  Outcome: Met This Shift  Note: No breakdown noted on this shift. Pt repositions self in bed frequently. Pt continent and calls appropriately. Will monitor. Problem: Cardiovascular  Goal: Hemodynamic stability  Outcome: Ongoing  Note: Pt in a fib  Goal: Agreement to quit smoking  Outcome: Ongoing  Note: Pt states open to quitting. Problem: Respiratory  Goal: Supplemental O2 requirements decreased  Outcome: Ongoing  Note: Baseline RA.  Pt currently on 2L via NC.

## 2020-09-22 NOTE — PROGRESS NOTES
Patient Active Problem List   Diagnosis    Essential hypertension    Lumbosacral radiculopathy    Pure hypercholesterolemia    Peripheral neuropathy    Body mass index 28.0-28.9, adult    DDD (degenerative disc disease), thoracolumbar    Tobacco abuse    Chronic idiopathic constipation    Benign prostatic hyperplasia with urinary frequency    Overactive bladder    Reactive depression    Major depressive disorder with single episode, in partial remission ([address]    Atrial fibrillation with rapid ventricular response ([address]   H&P dictated

## 2020-09-22 NOTE — PROGRESS NOTES
Spoke with Magalys Rolle with cardiology - restart cardizem if still in afib. Pt back in NSR after PO BB dose.

## 2020-09-22 NOTE — PROGRESS NOTES
Pt in Afib RVR. Rate 120-140. Dr. Buster Canseco reviewed EKG. OK to conitue with testing and proceed with Lexiscan. Instructed on Lexiscan Stress Test Procedure including possible side effects/ adverse reactions. Patient verbalizes  understanding and denies having any questions . See [address] [place] [address] Cardiology

## 2020-09-22 NOTE — PRE SEDATION
Brief Pre-Op Note/Sedation Assessment      Fili Gonzalez  1945  [id number]      [id number]  2:30 PM    Planned Procedure: Cardiac Catheterization Procedure    Post Procedure Plan: Return to same level of care    Consent: I have discussed with the patient and/or the patient representative the indication, alternatives, and the possible risks and/or complications of the planned procedure and the anesthesia methods. The patient and/or patient representative appear to understand and agree to proceed. Chief Complaint: Dyspnea on Exertion  Dyspnea      Indications for Cath Procedure:  Suspected CAD and Cardiac Arrythmia  Anginal Classification within 2 weeks:  No symptoms  NYHA Heart Failure Class within 2 weeks: Class III - Symptoms of HF on less-than-ordinary exertion, Newly Diagnosed? Yes, Heart Failure Type: Diastolic  Is Cath Lab Visit Valve-related?: No  Surgical Risk: Low  Functional Type: >= 4 METS with symptoms    Anti- Anginal Meds within 2 weeks:   Yes: Beta Blockers and Aspirin    Stress or Imaging Studies Performed:  None     Vital Signs:  /82   Pulse 55   Temp 97.7 °F (36.5 °C) (Oral)   Resp 20   Ht 5' 9\" (1.753 m)   Wt 184 lb (83.5 kg)   SpO2 96%   BMI 27.17 kg/m²     Allergies:   Allergies   Allergen Reactions    Shellfish Allergy     Augmentin [Amoxicillin-Pot Clavulanate] Nausea And Vomiting       Past Medical History:  Past Medical History:   Diagnosis Date    Hypercholesteremia     Hypertension          Surgical History:  Past Surgical History:   Procedure Laterality Date    HEMORRHOID SURGERY      UROLIFT    SHOULDER SURGERY Right     RTC          Medications:  Current Facility-Administered Medications   Medication Dose Route Frequency Provider Last Rate Last Dose    metoprolol tartrate (LOPRESSOR) tablet 12.5 mg  12.5 mg Oral BID Uzma Doherty MD   12.5 mg at 09/22/20 1108    acetaminophen (TYLENOL) tablet 650 mg  650 mg Oral Q4H PRN Rosa Brantley MD   650 mg at 09/22/20 1128    aspirin EC tablet 81 mg  81 mg Oral Daily Dionne Walker MD   81 mg at 09/22/20 0810    atorvastatin (LIPITOR) tablet 5 mg  5 mg Oral Nightly Dionne Walker MD   5 mg at 09/21/20 2241    gabapentin (NEURONTIN) capsule 300 mg  300 mg Oral BID Dionne Walker MD   300 mg at 09/22/20 0810    tamsulosin (FLOMAX) capsule 0.4 mg  0.4 mg Oral Daily Dionne Walker MD   0.4 mg at 09/22/20 0810    dilTIAZem injection 10 mg  10 mg Intravenous Once Dionne Walker MD        enoxaparin (LOVENOX) injection 80 mg  1 mg/kg Subcutaneous BID Dionne Walker MD   80 mg at 09/21/20 2241    finasteride (PROSCAR) tablet 5 mg  5 mg Oral Nightly Dionne Walker MD   5 mg at 09/21/20 2328    doxazosin (CARDURA) tablet 2 mg  2 mg Oral Nightly Dionne Walker MD   2 mg at 09/21/20 2329    citalopram (CELEXA) tablet 40 mg  40 mg Oral Nightly Dionne Walker MD   40 mg at 09/21/20 2328           Pre-Sedation:    Pre-Sedation Documentation and Exam:  I have assessed the patient and agree with the H&P present on the chart. Prior History of Anesthesia Complications:   none    Modified Mallampati:  I (soft palate, uvula, fauces, tonsillar pillars visible)    ASA Classification:  Class 1 - A normal healthy patient      Maria T Scale: Activity:  2 - Able to move 4 extremities voluntarily on command  Respiration:  2 - Able to breathe deeply and cough freely  Circulation:  2 - BP+/- 20mmHg of normal  Consciousness:  2 - Fully awake  Oxygen Saturation (color):  2 - Able to maintain oxygen saturation >92% on room air    Sedation/Anesthesia Plan:  Guard the patient's safety and welfare. Minimize physical discomfort and pain. Minimize negative psychological responses to treatment by providing sedation and analgesia and maximize the potential amnesia. Patient to meet pre-procedure discharge plan.     Medication Planned:  midazolam intravenously and fentanyl intravenously    Patient is an appropriate candidate for plan of sedation: yes      Electronically signed by Gregory Rubio MD on 9/22/2020 at 2:30 PM

## 2020-09-23 ENCOUNTER — TELEPHONE (OUTPATIENT)
Dept: PHARMACY | Age: 75
End: 2020-09-23

## 2020-09-23 ENCOUNTER — TELEPHONE (OUTPATIENT)
Dept: CARDIOLOGY CLINIC | Age: 75
End: 2020-09-23

## 2020-09-23 VITALS
TEMPERATURE: 97.7 F | WEIGHT: 184 LBS | SYSTOLIC BLOOD PRESSURE: 126 MMHG | OXYGEN SATURATION: 93 % | DIASTOLIC BLOOD PRESSURE: 69 MMHG | HEIGHT: 69 IN | RESPIRATION RATE: 16 BRPM | BODY MASS INDEX: 27.25 KG/M2 | HEART RATE: 55 BPM

## 2020-09-23 LAB
ANION GAP SERPL CALCULATED.3IONS-SCNC: 8 MMOL/L (ref 3–16)
BUN BLDV-MCNC: 17 MG/DL (ref 7–20)
CALCIUM SERPL-MCNC: 8.7 MG/DL (ref 8.3–10.6)
CHLORIDE BLD-SCNC: 108 MMOL/L (ref 99–110)
CO2: 23 MMOL/L (ref 21–32)
CREAT SERPL-MCNC: 0.9 MG/DL (ref 0.8–1.3)
GFR AFRICAN AMERICAN: >60
GFR NON-AFRICAN AMERICAN: >60
GLUCOSE BLD-MCNC: 147 MG/DL (ref 70–99)
INR BLD: 1.01 (ref 0.86–1.14)
POTASSIUM SERPL-SCNC: 5 MMOL/L (ref 3.5–5.1)
PROTHROMBIN TIME: 11.7 SEC (ref 10–13.2)
SODIUM BLD-SCNC: 139 MMOL/L (ref 136–145)

## 2020-09-23 PROCEDURE — 2580000003 HC RX 258: Performed by: INTERNAL MEDICINE

## 2020-09-23 PROCEDURE — 99233 SBSQ HOSP IP/OBS HIGH 50: CPT | Performed by: NURSE PRACTITIONER

## 2020-09-23 PROCEDURE — 36415 COLL VENOUS BLD VENIPUNCTURE: CPT

## 2020-09-23 PROCEDURE — 6370000000 HC RX 637 (ALT 250 FOR IP): Performed by: INTERNAL MEDICINE

## 2020-09-23 PROCEDURE — 94760 N-INVAS EAR/PLS OXIMETRY 1: CPT

## 2020-09-23 PROCEDURE — 85610 PROTHROMBIN TIME: CPT

## 2020-09-23 PROCEDURE — 80048 BASIC METABOLIC PNL TOTAL CA: CPT

## 2020-09-23 RX ORDER — WARFARIN SODIUM 5 MG/1
TABLET ORAL
Qty: 30 TABLET | Refills: 3 | Status: SHIPPED | OUTPATIENT
Start: 2020-09-23 | End: 2020-10-20

## 2020-09-23 RX ADMIN — GABAPENTIN 300 MG: 300 CAPSULE ORAL at 08:46

## 2020-09-23 RX ADMIN — METOPROLOL TARTRATE 12.5 MG: 25 TABLET, FILM COATED ORAL at 08:45

## 2020-09-23 RX ADMIN — ASPIRIN 81 MG: 81 TABLET, COATED ORAL at 08:46

## 2020-09-23 RX ADMIN — Medication 10 ML: at 08:46

## 2020-09-23 RX ADMIN — TAMSULOSIN HYDROCHLORIDE 0.4 MG: 0.4 CAPSULE ORAL at 08:46

## 2020-09-23 ASSESSMENT — PAIN SCALES - GENERAL
PAINLEVEL_OUTOF10: 0

## 2020-09-23 NOTE — PROGRESS NOTES
Data- discharge order received, pt verbalized agreement to discharge, disposition to previous residence, no needs for HHC/DME. Action- discharge instructions prepared and given to Tiki Kong, pt verbalized understanding. Medication information packet given r/t NEW and/or CHANGED prescriptions emphasizing name/purpose/side effects, pt verbalized understanding. Discharge instruction summary: Diet- cardiac, Activity-  As tolerated, Primary Care Physician as follows: Benjamin Rivera -[phone] f/u appointment 1 week. Meds sent to pharmacy of patients choice. Response- Pt belongings gathered, IV removed. Disposition is home (no HHC/DME needs), transported with wife, taken to lobby via w/c w/ belongings, no complications.

## 2020-09-23 NOTE — PLAN OF CARE
Problem: Cardiovascular  Goal: Hemodynamic stability  9/23/2020 1049 by Shabnam Roth RN  Outcome: Ongoing  Note: Tele on, NSR. Dietitian consulted for coumadin education. Pt to be sent home with cardiac monitor, given to pt at bedside by cardiology.

## 2020-09-23 NOTE — CONSULTS
Pharmacy to check patient copays for Eliquis/Xarelto:    Copay for patient will be: $272.23/month    Pharmacy will continue to follow the decision for anticoagulation and  the patient if appropriate.        Rayne Perez, PharmD, [address]  Clinical Pharmacist  [id number]

## 2020-09-23 NOTE — PROGRESS NOTES
[address]   Electrophysiology Progress Note     Date: 9/23/2020  Admit Date: 9/21/2020     Reason for consultation: Atrial fibrillation    Chief Complaint:   Chief Complaint   Patient presents with    Tachycardia     Pt recently had a urology procedure done and they noted bradycardia. Followed up with his PCP and they took him off his metoprolol and now his HR has been high. Was [facility] and EKG showed afib with RVR and sent him here. History of Present Illness: History obtained from patient and medical record. Elicia Watson is a 77 y.o. male with a past medical history of HTN and HLD. Pt presented to hospital with palpitations and tachycardia at his PCP office. Found to be in atrial fibrillation on EKG and was sent to the ER. He was recently taking off his BB due to asymptomatic bradycardia during a urology procedure. Interval Hx: Today, he is being seen for follow up. He remains in sinus rhythm on telemetry, no further AF. His right wrist cath site is stable, no swelling or hematoma. His blood pressure is stable. Pt was started on coumadin last night, but is asking why he cannot be on eliquis like his spouse. Will consult pharmacy to assess coverage. We discussed the need for a sleep study as an outpatient. Patient seen and examined. Clinical notes reviewed. Telemetry reviewed. No new complaints today. No major events overnight. Denies having chest pain, palpitations, shortness of breath, orthopnea/PND, cough, or dizziness at the time of this visit. Allergies: Allergies   Allergen Reactions    Shellfish Allergy     Augmentin [Amoxicillin-Pot Clavulanate] Nausea And Vomiting     Home Meds:  Prior to Visit Medications    Medication Sig Taking?  Authorizing Provider   aspirin 81 MG EC tablet Take 81 mg by mouth daily Yes Historical Provider, MD   doxazosin (CARDURA) 2 MG tablet Take 1 tablet by mouth daily Yes Vernetta Heimlich, MD   gabapentin (NEURONTIN) 300 MG capsule Take 1 capsule by mouth 2 times daily for 90 days. Yes Jam Estrada MD   atorvastatin (LIPITOR) 10 MG tablet Take 0.5 tablets by mouth daily Yes Jam Estrada MD   citalopram (CELEXA) 40 MG tablet Take 1 tablet by mouth daily Yes Jam Estrada MD   tamsulosin (FLOMAX) 0.4 MG capsule Take 1 capsule by mouth daily Yes Jam Estrada MD   finasteride (PROSCAR) 5 MG tablet Take 1 tablet by mouth daily Yes Jam Estrada MD      Scheduled Meds:   metoprolol tartrate  12.5 mg Oral BID    sodium chloride flush  10 mL Intravenous 2 times per day    warfarin  5 mg Oral Daily    aspirin  81 mg Oral Daily    atorvastatin  5 mg Oral Nightly    gabapentin  300 mg Oral BID    tamsulosin  0.4 mg Oral Daily    dilTIAZem  10 mg Intravenous Once    finasteride  5 mg Oral Nightly    doxazosin  2 mg Oral Nightly    citalopram  40 mg Oral Nightly     Continuous Infusions:  PRN Meds:acetaminophen, sodium chloride flush, acetaminophen, oxyCODONE-acetaminophen **OR** oxyCODONE-acetaminophen, ondansetron     Past Medical History:  Past Medical History:   Diagnosis Date    Hypercholesteremia     Hypertension         Past Surgical History:    has a past surgical history that includes Hemorrhoid surgery and shoulder surgery (Right). Social History:  Reviewed. reports that he has been smoking cigarettes. He has been smoking about 1.00 pack per day. He quit smokeless tobacco use about 9 years ago. He reports that he does not drink alcohol or use drugs. Family History:  Reviewed. family history includes Cancer in an other family member; High Blood Pressure in an other family member; Stroke in an other family member.      Review of Systems:  · Constitutional: Negative for fever, night sweats, chills, weight changes, or weakness  · Skin: Negative for rash, dry skin, pruritus, bruising, bleeding, blood clots, or changes in skin pigment  · HEENT: Negative for vision changes, ringing in the ears, sore throat, dysphagia, or swollen lymph nodes  · Respiratory: Reviewed in HPI  · Cardiovascular: Reviewed in HPI  · Gastrointestinal: Negative for abdominal pain, N/V/D, constipation, or black/tarry stools  · Genito-Urinary: Negative for dysuria, incontinence, urgency, or hematuria  · Musculoskeletal: Negative for joint swelling, muscle pain, or injuries  · Neurological/Psych: Negative for confusion, seizures, headaches, balance issues or TIA-like symptoms. No anxiety, depression, or insomnia    Physical Examination:  Vitals:    09/23/20 0833   BP: 124/74   Pulse: 67   Resp: 16   Temp: 97.8 °F (36.6 °C)   SpO2: 94%      In: 190 [P.O.:180; I.V.:10]  Out: -    Wt Readings from Last 3 Encounters:   09/21/20 184 lb (83.5 kg)   09/17/20 184 lb (83.5 kg)   07/20/20 190 lb (86.2 kg)       Intake/Output Summary (Last 24 hours) at 9/23/2020 0854  Last data filed at 9/23/2020 0847  Gross per 24 hour   Intake 190 ml   Output --   Net 190 ml       Telemetry: Personally Reviewed  - Sinus rhythm   · Constitutional: Cooperative and in no apparent distress, and appears well nourished  · Skin: Warm and pink; no pallor, cyanosis, bruising, or clubbing. Right wrist cath site stable, no swelling/hematoma/oozing  · HEENT: Symmetric and normocephalic. PERRL, EOM intact. Conjunctiva pink with clear sclera. Mucus membranes pink and moist. Teeth intact. Thyroid smooth without nodules or goiter. · Cardiovascular: Regular rate and rhythm. S1/S2 present without murmurs, rubs, or gallops. Peripheral pulses 2+, capillary refill < 3 seconds. No elevation of JVP. No peripheral edema  · Respiratory: Respirations symmetric and unlabored. Lungs clear to auscultation bilaterally, no wheezing, crackles, or rhonchi  · Gastrointestinal: Abdomen soft and round. Bowel sounds normoactive in all quadrants without tenderness or masses.   · Musculoskeletal: Bilateral upper and lower extremity strength 5/5 with full ROM  · Neurologic/Psych: Awake and orientated to person, place and time. Calm affect, appropriate mood    Pertinent labs, diagnostic, device, and imaging results reviewed as a part of this visit    Labs:    BMP:   Recent Labs     20  1535 20  0617 20  0612    139 139   K 4.5 4.4 5.0    108 108   CO2 22 25 23   BUN 17 15 17   CREATININE 0.8 0.9 0.9     Estimated Creatinine Clearance: 72 mL/min (based on SCr of 0.9 mg/dL). CBC:   Recent Labs     20  1400 20  1535   WBC 10.7 11.5*   HGB 14.0 14.0   HCT 41.4 41.2   MCV 90.3 91.1    199     Thyroid: No results found for: TSH, C3QTNOB, S3DNNCK, THYROIDAB  Lipids:   Lab Results   Component Value Date    CHOL 160 2018    HDL 38 2018    TRIG 224 2018     LFTS:   Lab Results   Component Value Date    ALT 11 2020    AST 20 2020    ALKPHOS 78 2020    PROT 6.0 2020    AGRATIO 1.5 2020    BILITOT 1.1 2020     Cardiac Enzymes:   Lab Results   Component Value Date    TROPONINI <0.01 2020    TROPONINI <0.01 2020     Coags:   Lab Results   Component Value Date    PROTIME 11.7 2020    INR 1.01 2020       EC20  Atrial fibrillation with RVR    ECHO: 20   Normal left ventricle size and systolic function with an estimated ejection fraction of 55%. There is borderline concentric left ventricular hypertrophy. No definitive regional wall motion abnormalities are seen. Indeterminate diastolic function 2/2 irregular rhythm. The aortic valve appears sclerotic but opens well. Mild tricuspid regurgitation. PASP estimated at 40-45 mmHg. Trivial mitral regurgitation. Stress Test: 20  Small sized inferior apical fixed defect of moderate intensity consistent     with infarction.          Reduced LV systolic function with calculated ejection fraction of 47%.          Overall findings represent a intermediate risk scan.       Cath: 20  Anatomy:   LM-NML   LAD-mid 20%  Cx-nml  OM- nml  RCA-Dominant mid 20%  RPDA- nml  LVEF- 50%  LVG- nml  LVEDP- 10     Contrast: 75  Flouro Time: 2.4  Access: R radial     Impression  ~Coronary Angiography w/ Nonobstructive CAD  ~LVG with LVEF of 50 and no regional wall motion abnormalities    Problem List:   Patient Active Problem List    Diagnosis Date Noted    Atrial fibrillation with rapid ventricular response ([facility] [address] 09/21/2020    Major depressive disorder with single episode, in partial remission ([facility] [address] 11/02/2018    Reactive depression 08/07/2018    Overactive bladder 02/06/2018    Benign prostatic hyperplasia with urinary frequency 11/02/2017    Chronic idiopathic constipation 08/01/2017    Tobacco abuse 06/02/2017    DDD (degenerative disc disease), thoracolumbar 01/12/2015    Body mass index 28.0-28.9, adult 06/03/2014    Essential hypertension 09/03/2013    Lumbosacral radiculopathy 09/03/2013    Pure hypercholesterolemia 09/03/2013    Peripheral neuropathy 09/03/2013        Assessment and Plan:     1. Paroxysmal Atrial Fibrillation  - Currently in NSR  - Continue metoprolol 12.5 mg BID   ~ Medication therapy is limited due to baseline bradycardia    - [id number] score: 2 (Age, HTN) ; [id number] Vasc score and anticoagulation discussed. High risk for stroke and thromboembolism. Anticoagulation is recommended. Risk of bleeding was discussed.  ~ On Coumadin; INRs to be monitored by [facility]  ~ Will consult pharmacy to assess DOAC coverage, if affordable pt would prefer DOAC over coumadin    - Afib risk factors including age, HTN, obesity, inactivity and GORDON were discussed with patient. Risk factor modification recommended   ~ TSH 0.97 (9/20)      - Treatment options including cardioversion, rate control strategy, antiarrhythmics, anticoagulation and possible ablation were discussed with patient. Risks, benefits and alternative of each treatment options were explained.  All questions answered     - Check MCOT to assess AF burden    2. HTN  - Controlled: Goal <130/80  - Continue current medications  - Encouraged to monitor and log BP readings at home, then bring log to next visit  - Discussed importance of low sodium diet, weight control and exercise    3. Hyperlipidemia  - Controlled. Goal: LDL <100   ~ LDL 77 (2018)  - On atorvastatin QD  - Discussed diet, weight loss, and exercise needs  - Followed per PCP    4. CAD  - Mild non obstructive CAD per cath (9/22/20)  - Stable  - No complaints of angina  - Cath site stable  - Continue BB and statin    5. At Risk for GORDON  - Multiple risk factors for GORDON  - Discussed long term effects of untreated GORDON  - Referral to sleep study center    [id number] Angelia Galeano for discharge from EP standpoint. Will follow up in office in 2 months. EP will sign off. Please call if there are any questions. Thank you for consult. All pertinent information and plan of care discussed with the EP physician. All questions and concerns were addressed to the patient. Alternatives to my treatment were discussed. I have discussed the above stated plan with patient and the nurse. The patient verbalized understanding and agreed with the plan. The patient was seen for >35 minutes. I reviewed interval history, physical exam, review of data including labs, imaging, development and implementation of treatment plan and coordination of complex care. More than 50% of the time was devoted to giving the patient detailed instructions instructions on addressing diet, regular exercise, weight control, smoking abstention, medication compliance, and stress minimization. Patient was provided written and verbal instructions regarding risk factor modification. Thank you for allowing to us to participate in the care of Dipak Salinas.     Jose A Singletary, APRN-CNP  [facility]   Office: [phone]

## 2020-09-23 NOTE — PROGRESS NOTES
Warfarin Counseling Note    Warfarin education completed. Patient verbalized understanding. Warfarin book was given to the patient.     On discharge, patient's INR will be monitored by: [facility]  Patient has an appointment scheduled for 09/28 0900    Lana Fenton, PharmD, [id number] Pharmacist  [id number]

## 2020-09-23 NOTE — TELEPHONE ENCOUNTER
Clinical pharmacist called to establish care for patient to clinic. Started on warfarin 9/22 taking 5 mg daily. Is not bridging. Collaborative faxed to Dr. Carilyn Frankel for signature, awaiting signed collaborative.      Scheduled initial appt for pt mon 9/28

## 2020-09-23 NOTE — PLAN OF CARE
Problem: Falls - Risk of:  Goal: Will remain free from falls  Description: Will remain free from falls  Outcome: Met This Shift  Note: Pt is wearing yellow nonskid socks. Bed is in lowest position, locked, side rails up 2/4, and call light is within reach. Pt informed of fall risks, verbalizes understanding. Will monitor. Problem: Pain Control  Goal: Maintain pain level at or below patient's acceptable level (or 5 if patient is unable to determine acceptable level)  Outcome: Met This Shift  Goal: Improvement in pain related behaviors BP/HR WNL  Outcome: Met This Shift     Problem: Cardiovascular  Goal: Hemodynamic stability  Outcome: Met This Shift  Note: Pt in NSR since re initiation of metoprolol. Problem: Respiratory  Goal: O2 Sat > 90%  Outcome: Met This Shift  Note: Pt on RA, O2 sat >90%. Problem: GI  Goal: Bowel movement at least every other day  Outcome: Met This Shift     Problem:   Goal: Adequate urinary output  Outcome: Met This Shift     Problem: Skin Integrity/Risk  Goal: No skin breakdown during hospitalization  Outcome: Met This Shift  Note: No breakdown noted on this shift. Pt repositions self in bed frequently. Pt continent and calls appropriately. Will monitor.

## 2020-09-23 NOTE — PROGRESS NOTES
Department of Internal Medicine  General Internal Medicine   Progress Note      SUBJECTIVE: feeling somnolent s/p heart cath procedure denies angina     History obtained from chart review and the patient  General ROS: positive for  - fatigue and malaise  negative for - chills, fever or night sweats  Psychological ROS: positive for - anxiety  negative for - behavioral disorder, hallucinations or memory difficulties  Ophthalmic ROS: negative  Respiratory ROS: no cough, shortness of breath, or wheezing  Cardiovascular ROS: no chest pain  But has  dyspnea on exertion  Gastrointestinal ROS: no abdominal pain, change in bowel habits, or black or bloody stools  Genito-Urinary ROS: no dysuria, trouble voiding, or hematuria  Musculoskeletal ROS: negative  Neurological ROS: no TIA or stroke symptoms  Dermatological ROS: negative    OBJECTIVE      Medications      Current Facility-Administered Medications: metoprolol tartrate (LOPRESSOR) tablet 12.5 mg, 12.5 mg, Oral, BID  acetaminophen (TYLENOL) tablet 650 mg, 650 mg, Oral, Q4H PRN  sodium chloride flush 0.9 % injection 10 mL, 10 mL, Intravenous, 2 times per day  sodium chloride flush 0.9 % injection 10 mL, 10 mL, Intravenous, PRN  acetaminophen (TYLENOL) tablet 650 mg, 650 mg, Oral, Q4H PRN  oxyCODONE-acetaminophen (PERCOCET) 5-325 MG per tablet 1 tablet, 1 tablet, Oral, Q4H PRN **OR** oxyCODONE-acetaminophen (PERCOCET) 5-325 MG per tablet 2 tablet, 2 tablet, Oral, Q4H PRN  ondansetron (ZOFRAN) injection 4 mg, 4 mg, Intravenous, Q6H PRN  warfarin (COUMADIN) tablet 5 mg, 5 mg, Oral, Daily  aspirin EC tablet 81 mg, 81 mg, Oral, Daily  atorvastatin (LIPITOR) tablet 5 mg, 5 mg, Oral, Nightly  gabapentin (NEURONTIN) capsule 300 mg, 300 mg, Oral, BID  tamsulosin (FLOMAX) capsule 0.4 mg, 0.4 mg, Oral, Daily  dilTIAZem injection 10 mg, 10 mg, Intravenous, Once **FOLLOWED BY** [DISCONTINUED] dilTIAZem 125 mg in dextrose 5 % 125 mL infusion, 5 mg/hr, Intravenous, Continuous  finasteride (PROSCAR) tablet 5 mg, 5 mg, Oral, Nightly  doxazosin (CARDURA) tablet 2 mg, 2 mg, Oral, Nightly  citalopram (CELEXA) tablet 40 mg, 40 mg, Oral, Nightly    Physical      Vitals: /69   Pulse 55   Temp 97.7 °F (36.5 °C) (Oral)   Resp 16   Ht 5' 9\" (1.753 m)   Wt 184 lb (83.5 kg)   SpO2 93%   BMI 27.17 kg/m²   Temp: Temp: 97.7 °F (36.5 °C)  Max: Temp  Av °F (36.7 °C)  Min: 97.6 °F (36.4 °C)  Max: 98.7 °F (37.1 °C)  Respiration range:  Resp  Avg: 15.4  Min: 14  Max: 16  Pulse Range:  Pulse  Av.8  Min: 50  Max: 67  Blood pressure range:  Systolic (72EBX), CFU:481 , Min:110 , FPQ:610   , Diastolic (28UHE), XFX:92, Min:57, Max:76    SpO2  Av.1 %  Min: 90 %  Max: 94 %    Intake/Output Summary (Last 24 hours) at 2020 1226  Last data filed at 2020 0847  Gross per 24 hour   Intake 190 ml   Output --   Net 190 ml       Vent settings:  Pulse  Av.8  Min: 37  Max: 141  Resp  Av.1  Min: 12  Max: 28  SpO2  Av.7 %  Min: 89 %  Max: 97 %    CONSTITUTIONAL:  fatigued, alert, cooperative, mild distress, appears stated age and normal weight  EYES:  Unremarkable   NECK:  No JVD  and supple, symmetrical, trachea midline  BACK:  symmetric and no curvature  LUNGS:  No increased work of breathing, good air exchange, clear to auscultation bilaterally, no crackles or wheezing  CARDIOVASCULAR:  Normal apical impulse, regular rate and rhythm, normal S1 and S2, no S3 or S4, and no murmur noted  ABDOMEN:  Soft BS + non tender   MUSCULOSKELETAL:  tracce edema   NEUROLOGIC:  Grossly intact   SKIN:  Warm and moist  and no bruising or bleeding    Data      Recent Results (from the past 96 hour(s))   EKG 12 lead    Collection Time: 09/21/20 12:50 PM   Result Value Ref Range    Ventricular Rate 150 BPM    Atrial Rate 150 BPM    QRS Duration 96 ms    Q-T Interval 312 ms    QTc Calculation (Bazett) 492 ms    R Axis 9 degrees    T Axis 39 degrees    Diagnosis       Atrial 41.2 40.5 - 52.5 %    MCV 91.1 80.0 - 100.0 fL    MCH 30.9 26.0 - 34.0 pg    MCHC 34.0 31.0 - 36.0 g/dL    RDW 13.8 12.4 - 15.4 %    Platelets 026 819 - 309 K/uL    MPV 9.1 5.0 - 10.5 fL    PLATELET SLIDE REVIEW Adequate     SLIDE REVIEW see below     Path Consult Yes     Neutrophils % 42.0 %    Lymphocytes % 49.0 %    Monocytes % 9.0 %    Eosinophils % 0.0 %    Basophils % 0.0 %    Neutrophils Absolute 4.8 1.7 - 7.7 K/uL    Lymphocytes Absolute 5.6 (H) 1.0 - 5.1 K/uL    Monocytes Absolute 1.0 0.0 - 1.3 K/uL    Eosinophils Absolute 0.0 0.0 - 0.6 K/uL    Basophils Absolute 0.0 0.0 - 0.2 K/uL    RBC Morphology Normal    Comprehensive Metabolic Panel w/ Reflex to MG    Collection Time: 09/21/20  3:35 PM   Result Value Ref Range    Sodium 137 136 - 145 mmol/L    Potassium reflex Magnesium 4.5 3.5 - 5.1 mmol/L    Chloride 106 99 - 110 mmol/L    CO2 22 21 - 32 mmol/L    Anion Gap 9 3 - 16    Glucose 89 70 - 99 mg/dL    BUN 17 7 - 20 mg/dL    CREATININE 0.8 0.8 - 1.3 mg/dL    GFR Non-African American >60 >60    GFR African American >60 >60    Calcium 8.8 8.3 - 10.6 mg/dL    Total Protein 6.0 (L) 6.4 - 8.2 g/dL    Alb 3.6 3.4 - 5.0 g/dL    Albumin/Globulin Ratio 1.5 1.1 - 2.2    Total Bilirubin 1.1 (H) 0.0 - 1.0 mg/dL    Alkaline Phosphatase 78 40 - 129 U/L    ALT 11 10 - 40 U/L    AST 20 15 - 37 U/L    Globulin 2.4 g/dL   Troponin    Collection Time: 09/21/20  3:35 PM   Result Value Ref Range    Troponin <0.01 <0.01 ng/mL   Brain Natriuretic Peptide    Collection Time: 09/21/20  3:35 PM   Result Value Ref Range    Pro- (H) 0 - 449 pg/mL   Lactate, Sepsis    Collection Time: 09/21/20  3:35 PM   Result Value Ref Range    Lactic Acid, Sepsis 0.8 0.4 - 1.9 mmol/L   Blood Smear Review    Collection Time: 09/21/20  3:35 PM   Result Value Ref Range    Path Consult Reviewed    Urinalysis Reflex to Culture    Collection Time: 09/21/20  4:32 PM    Specimen: Urine, clean catch   Result Value Ref Range    Color, UA YELLOW Value Ref Range    Protime 11.7 10.0 - 13.2 sec    INR 1.01 0.86 - 9.69   Basic Metabolic Panel    Collection Time: 09/23/20  6:12 AM   Result Value Ref Range    Sodium 139 136 - 145 mmol/L    Potassium 5.0 3.5 - 5.1 mmol/L    Chloride 108 99 - 110 mmol/L    CO2 23 21 - 32 mmol/L    Anion Gap 8 3 - 16    Glucose 147 (H) 70 - 99 mg/dL    BUN 17 7 - 20 mg/dL    CREATININE 0.9 0.8 - 1.3 mg/dL    GFR Non-African American >60 >60    GFR African American >60 >60    Calcium 8.7 8.3 - 10.6 mg/dL       ASSESSMENT AND PLAN     Active Problems:    Essential hypertension    Pure hypercholesterolemia    Peripheral neuropathy    Tobacco abuse    Atrial fibrillation with rapid ventricular response (HCC)  Resolved Problems:    * No resolved hospital problems.  *    S/P Heart cath normal coronaries , LVEF 50 percent    Dr Brant Berman may consider ablation , needs Oral anticoagulation  Which is in progress  , ct Bridge Lovenox therapy

## 2020-09-28 ENCOUNTER — ANTI-COAG VISIT (OUTPATIENT)
Dept: PHARMACY | Age: 75
End: 2020-09-28
Payer: MEDICARE

## 2020-09-28 VITALS — TEMPERATURE: 97.8 F

## 2020-09-28 LAB — INTERNATIONAL NORMALIZATION RATIO, POC: 1.9

## 2020-09-28 PROCEDURE — 85610 PROTHROMBIN TIME: CPT

## 2020-09-28 PROCEDURE — 99213 OFFICE O/P EST LOW 20 MIN: CPT

## 2020-09-28 NOTE — PROGRESS NOTES
Mr. Fili Gonzalez is a 77 y.o. y/o male with history of Afib w/ RVR who presents today for anticoagulation monitoring and adjustment. Pertinent PMH: Afib w/ RVR, hypertension, hypercholesterolemia. Started warfarin 9/21 @ 5mg daily    Patient Reported Findings:  Yes     No  [x]   []       Patient verifies current dosing regimen as listed  []   [x]       S/S bleeding/bruising/swelling/SOB  []   [x]       Blood in urine or stool  []   [x]       Procedures scheduled in the future at this time  []   [x]       Missed Dose - Denies   []   [x]       Extra Dose - Denies   []   [x]       Change in medications --> Does not use tylenol or other OTC products. Patient knows to call before adding medications or after changes. []   [x]       Change in health/diet/appetite - Does eat greens a few times each week, mostly green beans and cabbage occasionally. Patient educated to maintain consistency. []   [x]       Change in alcohol use - Does not use alcohol   []   [x]       Change in activity  []   [x]       Hospital admission - Admitted 9/21 for Afib w/ RVR. Startled warfarin 9/22  []   [x]       Emergency department visit  []   [x]       Other complaints    Clinical Outcomes:  Yes     No  []   [x]       Major bleeding event  []   [x]       Thromboembolic event    Duration of warfarin Therapy: Indefinite  INR Range:  2.0-3.0     Educated patient of signs and symptoms of bleeding and clotting, when to seek emergent care, the need to maintain a consistent diet and notification of clinic for any new medications including over the counter medications or abnormal bleeding. Patient acknowledges working in consult agreement with pharmacist as referred by his/her physician. INR is 1.9 today  As level is not at steady state yet and is already 1.9, will decrease weekly dose. Take 2.5mg Mon and Fri and 5mg all other days. Patient educated to eat a consistent amount of greens. Next INR check in 1 week - 10/5.     Patient unsure where he wants to fill with [facility] or outside pharmacy. Will let us know if a refill needs to be called in. Patient states he has enough to get through until next visit.      Referring MD: Dian Rai  INR (no units)   Date Value   09/28/2020 1.9   09/23/2020 1.01   09/22/2020 0.99     CLINICAL PHARMACY CONSULT: MED RECONCILIATION/REVIEW ADDENDUM    For Pharmacy Admin Tracking Only    PHSO: Yes  Total # of Interventions Recommended: 1  - Decreased Dose #: 1  - Maintenance Safety Lab Monitoring #: 1  Total Interventions Accepted: 1  Time Spent (min): [address], PharmD

## 2020-10-05 ENCOUNTER — ANTI-COAG VISIT (OUTPATIENT)
Dept: PHARMACY | Age: 75
End: 2020-10-05
Payer: MEDICARE

## 2020-10-05 ENCOUNTER — OFFICE VISIT (OUTPATIENT)
Dept: CARDIOLOGY CLINIC | Age: 75
End: 2020-10-05
Payer: MEDICARE

## 2020-10-05 VITALS
HEART RATE: 72 BPM | HEIGHT: 69 IN | WEIGHT: 181 LBS | BODY MASS INDEX: 26.81 KG/M2 | SYSTOLIC BLOOD PRESSURE: 129 MMHG | DIASTOLIC BLOOD PRESSURE: 79 MMHG

## 2020-10-05 VITALS — TEMPERATURE: 97.1 F

## 2020-10-05 PROBLEM — I25.83 CORONARY ARTERY DISEASE DUE TO LIPID RICH PLAQUE: Status: ACTIVE | Noted: 2020-10-05

## 2020-10-05 PROBLEM — I25.10 CORONARY ARTERY DISEASE DUE TO LIPID RICH PLAQUE: Status: ACTIVE | Noted: 2020-10-05

## 2020-10-05 PROBLEM — Z91.89 AT RISK FOR SLEEP APNEA: Status: ACTIVE | Noted: 2020-10-05

## 2020-10-05 LAB — INTERNATIONAL NORMALIZATION RATIO, POC: 1.8

## 2020-10-05 PROCEDURE — 4040F PNEUMOC VAC/ADMIN/RCVD: CPT | Performed by: NURSE PRACTITIONER

## 2020-10-05 PROCEDURE — 99214 OFFICE O/P EST MOD 30 MIN: CPT | Performed by: NURSE PRACTITIONER

## 2020-10-05 PROCEDURE — G8427 DOCREV CUR MEDS BY ELIG CLIN: HCPCS | Performed by: NURSE PRACTITIONER

## 2020-10-05 PROCEDURE — G8482 FLU IMMUNIZE ORDER/ADMIN: HCPCS | Performed by: NURSE PRACTITIONER

## 2020-10-05 PROCEDURE — 1111F DSCHRG MED/CURRENT MED MERGE: CPT | Performed by: NURSE PRACTITIONER

## 2020-10-05 PROCEDURE — 85610 PROTHROMBIN TIME: CPT

## 2020-10-05 PROCEDURE — 93000 ELECTROCARDIOGRAM COMPLETE: CPT | Performed by: NURSE PRACTITIONER

## 2020-10-05 PROCEDURE — 4004F PT TOBACCO SCREEN RCVD TLK: CPT | Performed by: NURSE PRACTITIONER

## 2020-10-05 PROCEDURE — 1123F ACP DISCUSS/DSCN MKR DOCD: CPT | Performed by: NURSE PRACTITIONER

## 2020-10-05 PROCEDURE — G8417 CALC BMI ABV UP PARAM F/U: HCPCS | Performed by: NURSE PRACTITIONER

## 2020-10-05 PROCEDURE — 3017F COLORECTAL CA SCREEN DOC REV: CPT | Performed by: NURSE PRACTITIONER

## 2020-10-05 PROCEDURE — 99211 OFF/OP EST MAY X REQ PHY/QHP: CPT

## 2020-10-05 NOTE — PROGRESS NOTES
[address]   Electrophysiology  TIMOTHY Villarreal-CNP  Attending EP: Dr. Janice Vu    Date: 10/5/2020  I had the privilege of visiting Shadia Morgan in the office. Chief Complaint:   Chief Complaint   Patient presents with    Follow-Up from Hospital     History of Present Illness: History obtained from patient and medical record. Shadia Morgan is 77 y.o. male with a past medical history of HTN and HLD.     In 2020, pt presented to hospital with palpitations and tachycardia at his PCP office. Found to be in atrial fibrillation on EKG and was sent to the ER. He was recently taking off his BB due to asymptomatic bradycardia during a urology procedure.    -Interval history: Today, Shadia Morgan is being seen for hospital follow up. He feels well since discharge. Denies any recurrence of atrial fibrillation. He is in NSR on EKG today. His BP is stable, 129/79. Pt reports similar readings at home. He is anti-coagulated on coumadin, but is going to call his insurance to see if he can take that since his wife is on it and it is only $45/month. He is down to 1/2 PPD of cigarettes, previously 2 PPD smoker. He is under a great deal of stress with the loss of several loved ones and his dog is not doing well. Pt reports he had to reschedule his sleep study due to a conflicting timing with his sister's . He likes to go for walks and hunt to help his stress. Denies having chest pain, palpitations, shortness of breath, orthopnea/PND, cough, or dizziness at the time of this visit. With regard to medication therapy the patient has been compliant with prescribed regimen. They have tolerated therapy to date. Allergies: Allergies   Allergen Reactions    Shellfish Allergy     Augmentin [Amoxicillin-Pot Clavulanate] Nausea And Vomiting     Home Medications:  Prior to Visit Medications    Medication Sig Taking?  Authorizing Provider   warfarin (COUMADIN) 5 MG tablet Monitor PT INR at coumadin clinic at [address], MD   metoprolol tartrate (LOPRESSOR) 25 MG tablet Take 0.5 tablets by mouth 2 times daily Yes Jessica Grams, MD   aspirin 81 MG EC tablet Take 81 mg by mouth daily Yes Historical Provider, MD   doxazosin (CARDURA) 2 MG tablet Take 1 tablet by mouth daily Yes Jessica Grams, MD   gabapentin (NEURONTIN) 300 MG capsule Take 1 capsule by mouth 2 times daily for 90 days. Yes Jessica Grams, MD   atorvastatin (LIPITOR) 10 MG tablet Take 0.5 tablets by mouth daily Yes Jessica Grams, MD   citalopram (CELEXA) 40 MG tablet Take 1 tablet by mouth daily Yes Jessica Grams, MD   apixaban (ELIQUIS) 5 MG TABS tablet Take 1 tablet by mouth 2 times daily  Patient not taking: Reported on 10/5/2020  Jessica Grams, MD   tamsulosin (FLOMAX) 0.4 MG capsule Take 1 capsule by mouth daily  Jessica Grams, MD   finasteride (PROSCAR) 5 MG tablet Take 1 tablet by mouth daily  Jessica Grams, MD      Past Medical History:  Past Medical History:   Diagnosis Date    Hypercholesteremia     Hypertension      Past Surgical History:    has a past surgical history that includes Hemorrhoid surgery and shoulder surgery (Right). Social History:  Reviewed. reports that he has been smoking cigarettes. He has been smoking about 0.50 packs per day. He quit smokeless tobacco use about 9 years ago. He reports that he does not drink alcohol or use drugs. Family History:  Reviewed. family history includes Cancer in an other family member; High Blood Pressure in an other family member; Stroke in an other family member.      Review of System:  · Constitutional: Negative for fever, night sweats, chills, weight changes, or weakness  · Skin: Negative for rash, dry skin, pruritus, bruising, bleeding, blood clots, or changes in skin pigment  · HEENT: Negative for vision changes, ringing in the ears, sore throat, dysphagia, or swollen lymph nodes  · Respiratory: Reviewed in HPI  · Cardiovascular: Reviewed in HPI  · Gastrointestinal: Negative for abdominal pain, N/V/D, constipation, or black/tarry stools  · Genito-Urinary: Negative for dysuria, incontinence, urgency, or hematuria  · Musculoskeletal: Negative for joint swelling, muscle pain, or injuries  · Neurological/Psych: Negative for confusion, seizures, dizziness, headaches, balance issues or TIA-like symptoms. No anxiety, depression, or insomnia    Physical Examination:  Vitals:    10/05/20 1517   BP: 129/79   Pulse: 72      Wt Readings from Last 3 Encounters:   10/05/20 181 lb (82.1 kg)   09/29/20 184 lb (83.5 kg)   09/21/20 184 lb (83.5 kg)     Constitutional: Cooperative and in no apparent distress, and appears well nourished  Skin: Warm and pink; no pallor, cyanosis, bruising, or clubbing  HEENT: Symmetric and normocephalic. PERRL, EOM intact. Conjunctiva pink with clear sclera. Mucus membranes pink and moist. Teeth intact. Thyroid smooth without nodules or goiter  Respiratory: Respirations symmetric and unlabored. Lungs clear to auscultation bilaterally, no wheezing, rhonchi, or crackles  Cardiovascular:  Regular rate and rhythm. S1/S2 present without murmurs, rubs, or gallops. Peripheral pulses 2+, capillary refill < 3 seconds. No elevation of JVP. No peripheral edema  Gastrointestinal: Abdomen soft and round. Bowel sounds normoactive in all quadrants without tenderness or masses. Musculoskeletal: Bilateral upper and lower extremity strength 5/5 with full ROM. Neurological/Psych: Awake and orientated to person, place and time. Calm affect, appropriate mood.      Pertinent labs, diagnostic, device, and imaging results reviewed as a part of this visit    LABS    CBC:   Lab Results   Component Value Date    WBC 11.5 (H) 09/21/2020    HGB 14.0 09/21/2020    HCT 41.2 09/21/2020    MCV 91.1 09/21/2020     09/21/2020     BMP:   Lab Results   Component Value Date    CREATININE 0.9 09/23/2020    BUN 17 09/23/2020     09/23/2020    K 5.0 09/23/2020     09/23/2020    CO2 23 09/23/2020     Estimated Creatinine Clearance: 72 mL/min (based on SCr of 0.9 mg/dL). Thyroid: No results found for: TSH, T8QJKYT, H8WFQQK, THYROIDAB  Lipid Panel:   Lab Results   Component Value Date    CHOL 160 08/08/2018    HDL 38 08/08/2018    TRIG 224 08/08/2018     LFTs:  Lab Results   Component Value Date    ALT 11 09/21/2020    AST 20 09/21/2020    ALKPHOS 78 09/21/2020    BILITOT 1.1 (H) 09/21/2020     Coags:   Lab Results   Component Value Date    PROTIME 11.7 09/23/2020    INR 1.9 09/28/2020       ECG: 10/5/2020  - NSR, rate 60, QTc 416    Echo: 9/20   Normal left ventricle size and systolic function with an estimated ejection   fraction of 55%. There is borderline concentric left ventricular hypertrophy. No definitive regional wall motion abnormalities are seen. Indeterminate diastolic function 2/2 irregular rhythm. The aortic valve appears sclerotic but opens well. Mild tricuspid regurgitation. PASP estimated at 40-45 mmHg. Trivial mitral regurgitation. GXT: 9/20   Small sized inferior apical fixed defect of moderate intensity consistent     with infarction.          Reduced LV systolic function with calculated ejection fraction of 47%.          Overall findings represent a intermediate risk scan. Cath: 9/23/20  Anatomy:   LM-NML   LAD-mid 20%  Cx-nml  OM- nml  RCA-Dominant mid 20%  RPDA- nml  LVEF- 50%  LVG- nml  LVEDP- 10     Contrast: 75  Flouro Time: 2.4  Access: R radial     Impression  ~Coronary Angiography w/ Nonobstructive CAD  ~LVG with LVEF of 50 and no regional wall motion abnormalities    Event Monitor: 9/20  Pending (Still wearing)    Assessment:    1. Paroxysmal Atrial Fibrillation  - Currently in NSR  - Continue metoprolol 12.5 mg BID  - [id number] score: 2 (Age, HTN) ; [id number] Vasc score and anticoagulation discussed. High risk for stroke and thromboembolism. Anticoagulation is recommended.  Risk of bleeding was discussed.  ~ On Coumadin; INRs monitored by [facility] anti-coagulation clinic    - Afib risk factors including age, HTN, obesity, inactivity and GORDON were discussed with patient. Risk factor modification recommended   ~ TSH 0.97 (9/20)       - Treatment options including cardioversion, rate control strategy, antiarrhythmics, anticoagulation and possible ablation were discussed with patient. Risks, benefits and alternative of each treatment options were explained. All questions answered     - Complete event monitor to assess for AF burden    2. HTN  - Controlled: Goal <130/80  - Continue current medications  - Encouraged to monitor and log BP readings at home, then bring log to next visit  - Discussed importance of low sodium diet, weight control and exercise    3. Hyperlipidemia   - Controlled. Goal: LDL <100               ~ LDL 77 (2018)   - On atorvastatin 5 mg QD   - Discussed diet, weight loss, and exercise needs   - Followed per PCP     4. CAD   - Mild non obstructive CAD per cath (9/22/20)   - Stable   - No complaints of angina   - Continue BB and statin     5. At Risk for GORDON   - Multiple risk factors for GORDON   - Discussed long term effects of untreated GORDON   - Referral to sleep study center    6. Tobacco Abuse   - Pt continues to smoke cigarettes daily   - Discussed benefits of cessation and risks of continued use   - Tobacco cessation counseling completed    Plan:  1. Complete event monitor  2. Call office if any issues  3. Check with insurance to see eliquis pricing. Call if [place] will cover and I will write prescription for you  4. Exercise as tolerated  5. Reduce cigarettes use  6.  Complete sleep study when able    F/U: Follow-up with Dr. Inés Harris in 4 months  -Call Henrry Steel at [phone] with any questions    Diet & Exercise:   The patient is counseled to follow a low salt diet to assure blood pressure remains controlled for cardiovascular risk factor modification   The patient is counseled to avoid excess caffeine, and energy drinks as this may exacerbated ectopy and arrhythmia   The patient is counseled to lose weight to control cardiovascular risk factors   Exercise program discussed: To improve overall cardiovascular health, the patient is instructed to increase cardiovascular related activities with a goal of 150 min/week of moderate level activity or 10,000 steps per day. Encouraged to perform as much activity as tolerated    Quality Metrics  1. Tobacco Cessation Counseling: N/A   2. Retake of BP if >140/90: N/A   3. Documentation to PCP: Note sent to PCP office visit  4. CAD patient on anti-platelet: No due to [facility]  5. CAD patient on STATIN therapy: Yes  6. Patient with history of CHF and atrial fibrillation on anticoagulation: Yes      I have addressed the patient's cardiac risk factors and adjusted pharmacologic treatment as needed. In addition, I have reinforced the need for patient directed risk factor modification. I independently reviewed the ECG    All questions and concerns were addressed with the patient. Alternatives to treatment were discussed. Thank you for allowing to us to participate in the care of Milka Kezia.     TIMOTHY Price-CNP  [facility]   Office: [phone]

## 2020-10-05 NOTE — PATIENT INSTRUCTIONS
1. Complete event monitor  2. Call office if any issues  3. Check with insurance to see eliquis pricing  4. Exercise as tolerated  5.  Reduce cigarettes use

## 2020-10-05 NOTE — PROGRESS NOTES
Mr. Sera English is a 77 y.o. y/o male with history of Afib w/ RVR who presents today for anticoagulation monitoring and adjustment. Pertinent PMH: Afib w/ RVR, hypertension, hypercholesterolemia. Started warfarin 9/21 @ 5mg daily    Patient Reported Findings:  Yes     No  [x]   []       Patient verifies current dosing regimen as listed  []   [x]       S/S bleeding/bruising/swelling/SOB  []   [x]       Blood in urine or stool  []   [x]       Procedures scheduled in the future at this time  []   [x]       Missed Dose - Denies   []   [x]       Extra Dose - Denies   []   [x]       Change in medications --> Does not use tylenol or other OTC products. Patient knows to call before adding medications or after changes. --> ASA 81mg  []   [x]       Change in health/diet/appetite - Does eat greens a few times each week, mostly green beans and cabbage occasionally. Patient educated to maintain consistency. []   [x]       Change in alcohol use - Does not use alcohol   []   [x]       Change in activity  []   [x]       Hospital admission - Admitted 9/21 for Afib w/ RVR. Startled warfarin 9/22  []   [x]       Emergency department visit  []   [x]       Other complaints    Clinical Outcomes:  Yes     No  []   [x]       Major bleeding event  []   [x]       Thromboembolic event    Duration of warfarin Therapy: Indefinite  INR Range:  2.0-3.0     Educated patient of signs and symptoms of bleeding and clotting, when to seek emergent care, the need to maintain a consistent diet and notification of clinic for any new medications including over the counter medications or abnormal bleeding. Patient acknowledges working in consult agreement with pharmacist as referred by his/her physician. INR is 1.8 today  Increase dose to 2.5mg Fri and 5mg all other days (~8%)  Patient educated to eat a consistent amount of greens.    Patient may be changing to Eliquis if insurance is able to cover, patient will let us know when they find out.  Will need refill called to [facility] at next visit. Next INR check in 10 days - 10/15.      Referring MD: Gabino Stringer  INR (no units)   Date Value   10/05/2020 1.8   09/28/2020 1.9   09/23/2020 1.01   09/22/2020 0.99     CLINICAL PHARMACY CONSULT: MED RECONCILIATION/REVIEW ADDENDUM    For Pharmacy Admin Tracking Only    PHSO: Yes  Total # of Interventions Recommended: 1  - Increased Dose #: 1  - Maintenance Safety Lab Monitoring #: 1  Total Interventions Accepted: 1  Time Spent (min): 55 A. Carolyn Machado, PharmD

## 2020-10-23 PROCEDURE — 93228 REMOTE 30 DAY ECG REV/REPORT: CPT | Performed by: INTERNAL MEDICINE

## 2020-10-26 ENCOUNTER — HOSPITAL ENCOUNTER (OUTPATIENT)
Age: 75
Discharge: HOME | End: 2020-10-26
Payer: MEDICARE

## 2020-10-26 LAB
ABO/RH: NORMAL
ANTIBODY SCREEN: NORMAL

## 2020-10-26 PROCEDURE — 86850 RBC ANTIBODY SCREEN: CPT

## 2020-10-26 PROCEDURE — 36415 COLL VENOUS BLD VENIPUNCTURE: CPT

## 2020-10-26 PROCEDURE — 86900 BLOOD TYPING SEROLOGIC ABO: CPT

## 2020-10-26 PROCEDURE — 86901 BLOOD TYPING SEROLOGIC RH(D): CPT

## 2020-11-01 NOTE — PROGRESS NOTES
Patient seen , discharge dictated scripts given , arrangements made , SALMA completed .  Discussed with nursing staff  And   If applicable ,  Discussed with  Patient's family , all questions answered and concerns addressed  When applicable

## 2020-11-05 PROBLEM — F32.4 MAJOR DEPRESSIVE DISORDER WITH SINGLE EPISODE, IN PARTIAL REMISSION (HCC): Chronic | Status: ACTIVE | Noted: 2018-11-02

## 2020-11-05 PROBLEM — I25.10 CORONARY ARTERY DISEASE DUE TO LIPID RICH PLAQUE: Chronic | Status: ACTIVE | Noted: 2020-10-05

## 2020-11-05 PROBLEM — F32.9 REACTIVE DEPRESSION: Chronic | Status: ACTIVE | Noted: 2018-08-07

## 2020-11-05 PROBLEM — I25.83 CORONARY ARTERY DISEASE DUE TO LIPID RICH PLAQUE: Chronic | Status: ACTIVE | Noted: 2020-10-05

## 2020-11-05 PROBLEM — I48.91 ATRIAL FIBRILLATION (HCC): Chronic | Status: ACTIVE | Noted: 2020-09-21

## 2020-11-10 ENCOUNTER — ANTI-COAG VISIT (OUTPATIENT)
Dept: PHARMACY | Age: 75
End: 2020-11-10

## 2020-11-10 PROBLEM — I48.91 ATRIAL FIBRILLATION (HCC): Chronic | Status: RESOLVED | Noted: 2020-09-21 | Resolved: 2020-11-10

## 2021-01-08 ENCOUNTER — OFFICE VISIT (OUTPATIENT)
Dept: PULMONOLOGY | Age: 76
End: 2021-01-08
Payer: MEDICARE

## 2021-01-08 VITALS
HEIGHT: 69 IN | DIASTOLIC BLOOD PRESSURE: 60 MMHG | SYSTOLIC BLOOD PRESSURE: 116 MMHG | WEIGHT: 180 LBS | TEMPERATURE: 98.6 F | BODY MASS INDEX: 26.66 KG/M2

## 2021-01-08 DIAGNOSIS — G47.33 OSA (OBSTRUCTIVE SLEEP APNEA): Primary | ICD-10-CM

## 2021-01-08 DIAGNOSIS — I10 ESSENTIAL HYPERTENSION: Chronic | ICD-10-CM

## 2021-01-08 PROCEDURE — 3017F COLORECTAL CA SCREEN DOC REV: CPT | Performed by: INTERNAL MEDICINE

## 2021-01-08 PROCEDURE — 1123F ACP DISCUSS/DSCN MKR DOCD: CPT | Performed by: INTERNAL MEDICINE

## 2021-01-08 PROCEDURE — 99204 OFFICE O/P NEW MOD 45 MIN: CPT | Performed by: INTERNAL MEDICINE

## 2021-01-08 PROCEDURE — 4040F PNEUMOC VAC/ADMIN/RCVD: CPT | Performed by: INTERNAL MEDICINE

## 2021-01-08 PROCEDURE — G8417 CALC BMI ABV UP PARAM F/U: HCPCS | Performed by: INTERNAL MEDICINE

## 2021-01-08 PROCEDURE — G8482 FLU IMMUNIZE ORDER/ADMIN: HCPCS | Performed by: INTERNAL MEDICINE

## 2021-01-08 PROCEDURE — G8427 DOCREV CUR MEDS BY ELIG CLIN: HCPCS | Performed by: INTERNAL MEDICINE

## 2021-01-08 PROCEDURE — 4004F PT TOBACCO SCREEN RCVD TLK: CPT | Performed by: INTERNAL MEDICINE

## 2021-01-08 ASSESSMENT — SLEEP AND FATIGUE QUESTIONNAIRES
HOW LIKELY ARE YOU TO NOD OFF OR FALL ASLEEP WHILE LYING DOWN TO REST IN THE AFTERNOON WHEN CIRCUMSTANCES PERMIT: 2
HOW LIKELY ARE YOU TO NOD OFF OR FALL ASLEEP WHEN YOU ARE A PASSENGER IN A CAR FOR AN HOUR WITHOUT A BREAK: 0
HOW LIKELY ARE YOU TO NOD OFF OR FALL ASLEEP IN A CAR, WHILE STOPPED FOR A FEW MINUTES IN TRAFFIC: 0
ESS TOTAL SCORE: 6
HOW LIKELY ARE YOU TO NOD OFF OR FALL ASLEEP WHILE SITTING QUIETLY AFTER LUNCH WITHOUT ALCOHOL: 1
HOW LIKELY ARE YOU TO NOD OFF OR FALL ASLEEP WHILE SITTING AND READING: 1
HOW LIKELY ARE YOU TO NOD OFF OR FALL ASLEEP WHILE SITTING INACTIVE IN A PUBLIC PLACE: 1

## 2021-01-08 NOTE — PROGRESS NOTES
PULMONARY OFFICE NEW PATIENT VISIT    CONSULTING PHYSICIAN:  Milagro King MD , Lauren Ventura, APRN -*     REASON FOR VISIT:   Chief Complaint   Patient presents with    New Patient     pt referred for sleep consult       DATE OF VISIT: 1/8/2021    HISTORY OF PRESENT ILLNESS: 77y.o. year old male comes in with pulmonary/sleep clinic for the first time. Patient reports that he has been having poor quality sleep for quite some time now. He has had several stressors in his life including his family member losses. He also had urological issues which required procedures. He continues to report snoring, gasping, choking at nighttime. He wakes up at least twice or thrice previously. During the Daytime He Feels Tired and Exhausted. Weight Has Been Stable. Denies Any A.M. Dry Mouth or A.M. Headaches. Sleep Medicine 1/8/2021 11/5/2020   Sitting and reading 1 0   Watching TV 1 0   Sitting, inactive in a public place (e.g. a theatre or a meeting) 1 0   As a passenger in a car for an hour without a break 0 0   Lying down to rest in the afternoon when circumstances permit 2 2   Sitting and talking to someone 0 0   Sitting quietly after a lunch without alcohol 1 1   In a car, while stopped for a few minutes in traffic 0 0   Total score 6 3   Neck circumference 16.5 -            REVIEW OF SYSTEMS:   CONSTITUTIONAL SYMPTOMS: The patient denies fever, fatigue, night sweats, weight loss or weight gain. HEENT: No vision changes. No tinnitus, Denies sinus pain. No hoarseness, or dysphagia. NECK: Patient denies swelling in the neck. CARDIOVASCULAR: Denies chest pain, palpitation, syncope. RESPIRATORY: Denies shortness of breath or cough. GASTROINTESTINAL: Denies nausea, abdominal pain or change in bowel function. GENITOURINARY: Denies obstructive symptoms. No history of incontinence. BREASTS: No masses or lumps in the breasts. SKIN: No rashes or itching.    MUSCULOSKELETAL: Denies weakness or bone pain.   NEUROLOGICAL: No headaches or seizures. PSYCHIATRIC: Denies mood swings or depression. ENDOCRINE: Denies heat or cold intolerance or excessive thirst.  HEMATOLOGIC/LYMPHATIC: Denies easy bruising or lymph node swelling. ALLERGIC/IMMUNOLOGIC: No environmental allergies. PAST MEDICAL HISTORY:   Past Medical History:   Diagnosis Date    Hypercholesteremia     Hypertension        PAST SURGICAL HISTORY:   Past Surgical History:   Procedure Laterality Date    HEMORRHOID SURGERY      UROLIFT    SHOULDER SURGERY Right     RTC        SOCIAL HISTORY:   Social History     Tobacco Use    Smoking status: Current Every Day Smoker     Packs/day: 0.50     Years: 50.00     Pack years: 25.00     Types: Cigarettes    Smokeless tobacco: Former User     Quit date: 1/1/2011   Substance Use Topics    Alcohol use: No    Drug use: No       FAMILY HISTORY:   Family History   Problem Relation Age of Onset    Cancer Other     High Blood Pressure Other     Stroke Other        MEDICATIONS:     Current Outpatient Medications on File Prior to Visit   Medication Sig Dispense Refill    apixaban (ELIQUIS) 5 MG TABS tablet Take 1 tablet by mouth 2 times daily 180 tablet 2    aspirin 81 MG EC tablet Take 1 tablet by mouth daily 30 tablet 0    doxazosin (CARDURA) 2 MG tablet Take 1 tablet by mouth daily 90 tablet 0    gabapentin (NEURONTIN) 300 MG capsule Take 1 capsule by mouth 2 times daily for 90 days. 180 capsule 0    citalopram (CELEXA) 40 MG tablet Take 1 tablet by mouth daily 90 tablet 0    atorvastatin (LIPITOR) 10 MG tablet Take 0.5 tablets by mouth daily 90 tablet 0    metoprolol tartrate (LOPRESSOR) 25 MG tablet Take 0.5 tablets by mouth 2 times daily 60 tablet 3    tamsulosin (FLOMAX) 0.4 MG capsule Take 1 capsule by mouth daily 30 capsule 2    finasteride (PROSCAR) 5 MG tablet Take 1 tablet by mouth daily 30 tablet 2     No current facility-administered medications on file prior to visit. ALLERGIES:   Allergies as of 01/08/2021 - Review Complete 01/08/2021   Allergen Reaction Noted    Shellfish allergy  08/09/2011    Augmentin [amoxicillin-pot clavulanate] Nausea And Vomiting 01/21/2020      OBJECTIVE:   height is 5' 9\" (1.753 m) and weight is 180 lb (81.6 kg). His temperature is 98.6 °F (37 °C). His blood pressure is 116/60. Neck circumference: 16.5    PHYSICAL EXAM:    CONSTITUTIONAL: He is a 77y.o.-year-old who appears his stated age. He is alert and oriented x 3 and in no acute distress. HEENT: PERRLA, EOMI. No scleral icterus. No thrush, atraumatic, normocephalic. Mallampati class I airway. NECK: Supple, without cervical or supraclavicular lymphadenopathy:  CARDIOVASCULAR: S1 S2 RRR. Without murmer  RESPIRATORY & CHEST: Lungs are clear to auscultation and percussion. No wheezing, no crackles. Good air movement  GASTROINTESTINAL & ABDOMEN: Soft, nontender, positive bowel sounds in all quadrants, non-distended, without hepatosplenomegaly. GENITOURINARY: Deferred. MUSCULOSKELETAL: No tenderness to palpation of the axial skeleton. There is no clubbing. No cyanosis. No edema of the lower extremities. SKIN OF BODY: No rash or jaundice. PSYCHIATRIC EVALUATION: Normal affect. Patient answers questions appropriately. HEMATOLOGIC/LYMPHATIC/ IMMUNOLOGIC: No palpable lymphadenopathy. NEUROLOGIC: Alert and oriented x 3. Groslly non-focal. Motor strength is 5+/5 in all muscle groups. The patient has a normal sensorium globally. LABS:    Lab Summary Latest Ref Rng & Units 9/23/2020 9/22/2020 9/21/2020   WBC 4.0 - 11.0 K/uL - - 11. 5(H)   Hgb 13.5 - 17.5 g/dL - - 14.0   Hct 40.5 - 52.5 % - - 41.2   Platelets 921 - 149 K/uL - - 199   Sodium 136 - 145 mmol/L 139 139 137   Potassium 3.5 - 5.1 mmol/L 5.0 4.4 4.5   BUN 7 - 20 mg/dL 17 15 17   Creatinine 0.8 - 1.3 mg/dL 0.9 0.9 0.8   Glucose 70 - 99 mg/dL 147(H) 97 89   Calcium 8.3 - 10.6 mg/dL 8.7 8.4 8.8   Alk Phos 40 - 129 U/L - - 78 Albumin 3.4 - 5.0 g/dL - - 3.6   Bilirubin 0.0 - 1.0 mg/dL - - 1. 1(H)   AST 15 - 37 U/L - - 20   ALT 10 - 40 U/L - - 11   TSH 0.27 - 4.20 uIU/mL - 0.97 -   Some recent data might be hidden         IMAGING:    No new chest imaging for me to review. Pulmonary Functions Testing Results:          IMPRESSION AND RECOMMENDATIONS:     1. GORDON (obstructive sleep apnea)  -Patient has several features which are suggestive of obstructive sleep apnea. -I will order for an in lab polysomnography to rule this possibility out. Patient may also need a CPAP titration study. 2. Essential hypertension  -Currently patient on Lopressor and doxazosin. 3. Body mass index 28.0-28.9, adult  -I strongly advised the patient to make efforts to lose weight. I discussed various modalities including moderate intensity intermittent exercises, diet control and bariatric surgery. If the patient loses even 10 to 15% of current body weight, it will be beneficial in improving the overall health. Return in about 6 weeks (around 2/19/2021). Beto Brunson MD  Pulmonary Critical Care and Sleep Medicine  1/8/2021, 2:24 PM    This note was completed using dragon medical speech recognition software. Grammatical errors, random word insertions, pronoun errors and incomplete sentences are occasional consequences of this technology due to software limitations. If there are questions or concerns about the content of this note of information contained within the body of this dictation they should be addressed with the provider for clarification.

## 2021-01-13 ENCOUNTER — OFFICE VISIT (OUTPATIENT)
Dept: PRIMARY CARE CLINIC | Age: 76
End: 2021-01-13
Payer: MEDICARE

## 2021-01-13 DIAGNOSIS — Z20.828 EXPOSURE TO SARS-ASSOCIATED CORONAVIRUS: Primary | ICD-10-CM

## 2021-01-13 PROCEDURE — G8428 CUR MEDS NOT DOCUMENT: HCPCS | Performed by: NURSE PRACTITIONER

## 2021-01-13 PROCEDURE — 99211 OFF/OP EST MAY X REQ PHY/QHP: CPT | Performed by: NURSE PRACTITIONER

## 2021-01-13 PROCEDURE — G8417 CALC BMI ABV UP PARAM F/U: HCPCS | Performed by: NURSE PRACTITIONER

## 2021-01-13 NOTE — PATIENT INSTRUCTIONS
You have received a viral test for COVID-19. Below is education on quarantine per the CDC guidelines. For any symptoms, seek care from your PCP, call [phone] to establish care with a doctor, or go directly to an urgent care or the emergency room. Test results will take 2-7 days and will be sent to you in your [employer] account. If you test positive, you will be contacted via phone. If you test negative, the ONLY communication will be through [address] GO TO [employer] AND SIGN UP FOR [employer]  (LOWER LEFT OF THE HOME PAGE)  No test is 100%. If you have symptoms, you should follow the guidance of quarantine as previously stated. You can still be contagious if you have symptoms. Your [facility] will reach out to you if you have a positive result. They will provide you with a return to work date and note. If you were tested for a pre-op, then you should remain in quarantine until your procedure. How do I know if I need to be in quarantine? If you live in a community where COVID-19 is or might be spreading (currently, that is virtually everywhere in the [place])  Be alert for symptoms. Watch for fever, cough, shortness of breath, or other symptoms of COVID-19.  ? Take your temperature if symptoms develop. ? Practice social distancing. Maintain 6 feet of distance from others and stay out of crowded places. ? Follow CDC guidance if symptoms develop. If you feel healthy but:  ? Recently had close contact with a person with COVID-19 you need to Quarantine:  ? Stay home until 14 days after your last exposure. ? Check your temperature twice a day and watch for symptoms of COVID-19.  ? If possible, stay away from people who are at higher-risk for getting very sick from COVID-19. Stay Home and Monitor Your Health if you:  ? Have been diagnosed with COVID-19, or  ? Are waiting for test results, or  ?  Have cough, fever, or shortness of breath, or symptoms of COVID-19 When You Can be Around Others After You Had or Likely Had COVID-19     If you have or think you might have COVID-19, it is important to stay home and away from other people. Staying away from others helps stop the spread of COVID-19. If you have an emergency warning sign (including trouble breathing), get emergency medical care immediately. When you can be around others (end home isolation) depends on different factors for different situations. Find CDC's recommendations for your situation below. I think or know I had COVID-19, and I had symptoms  You can be with others after  ? 3 days with no fever and  ? Respiratory symptoms have improved (e.g. cough, shortness of breath) and  ? 10 days since symptoms first appeared  Depending on your healthcare provider's advice and availability of testing, you might get tested to see if you still have COVID-19. If you will be tested, you can be around others when you have no fever, respiratory symptoms have improved, and you receive two negative test results in a row, at least 24 hours apart. I tested positive for COVID-19 but had no symptoms  If you continue to have no symptoms, you can be with others after:  ? 10 days have passed since test or 14 days since your exposure test   Depending on your healthcare provider's advice and availability of testing, you might get tested to see if you still have COVID-19. If you will be tested, you can be around others after you receive two negative test results in a row, at least 24 hours apart. If you develop symptoms after testing positive, follow the guidance above for I think or know I had COVID, and I had symptoms.   For Anyone Who Has Been Around a Person with COVID-19  It is important to remember that anyone who has close contact with someone with COVID-19 should stay home for 14 days after exposure based on the time it takes to develop illness. Testing is not necessary.     www.cdc.gov/coronavirus/2019-ncov/index.html

## 2021-01-13 NOTE — PROGRESS NOTES
Teresa Beck received a viral test for COVID-19. They were educated on isolation and quarantine as appropriate. For any symptoms, they were directed to seek care from their PCP, given contact information to establish with a doctor, directed to an urgent care or the emergency room.

## 2021-01-14 LAB — SARS-COV-2: NOT DETECTED

## 2021-01-19 ENCOUNTER — HOSPITAL ENCOUNTER (OUTPATIENT)
Dept: SLEEP CENTER | Age: 76
Discharge: HOME | End: 2021-01-19
Payer: MEDICARE

## 2021-01-19 DIAGNOSIS — G47.33 OSA (OBSTRUCTIVE SLEEP APNEA): Primary | ICD-10-CM

## 2021-01-19 DIAGNOSIS — G47.33 OSA (OBSTRUCTIVE SLEEP APNEA): ICD-10-CM

## 2021-01-19 PROBLEM — I48.0 PAROXYSMAL ATRIAL FIBRILLATION (HCC): Status: ACTIVE | Noted: 2020-09-21

## 2021-01-19 PROCEDURE — 95810 POLYSOM 6/> YRS 4/> PARAM: CPT

## 2021-01-19 PROCEDURE — 95810 POLYSOM 6/> YRS 4/> PARAM: CPT | Performed by: INTERNAL MEDICINE

## 2021-01-26 ENCOUNTER — OFFICE VISIT (OUTPATIENT)
Dept: ENT CLINIC | Age: 76
End: 2021-01-26
Payer: MEDICARE

## 2021-01-26 ENCOUNTER — PROCEDURE VISIT (OUTPATIENT)
Dept: AUDIOLOGY | Age: 76
End: 2021-01-26
Payer: MEDICARE

## 2021-01-26 VITALS — DIASTOLIC BLOOD PRESSURE: 77 MMHG | SYSTOLIC BLOOD PRESSURE: 140 MMHG | HEART RATE: 63 BPM | TEMPERATURE: 97.1 F

## 2021-01-26 DIAGNOSIS — H91.8X9 ASYMMETRICAL HEARING LOSS, UNSPECIFIED LATERALITY: Primary | ICD-10-CM

## 2021-01-26 DIAGNOSIS — H61.21 IMPACTED CERUMEN OF RIGHT EAR: ICD-10-CM

## 2021-01-26 DIAGNOSIS — H90.3 SENSORINEURAL HEARING LOSS (SNHL) OF BOTH EARS: Primary | ICD-10-CM

## 2021-01-26 DIAGNOSIS — H93.13 TINNITUS OF BOTH EARS: ICD-10-CM

## 2021-01-26 DIAGNOSIS — H90.3 SENSORINEURAL HEARING LOSS (SNHL) OF BOTH EARS: ICD-10-CM

## 2021-01-26 PROCEDURE — G8427 DOCREV CUR MEDS BY ELIG CLIN: HCPCS | Performed by: STUDENT IN AN ORGANIZED HEALTH CARE EDUCATION/TRAINING PROGRAM

## 2021-01-26 PROCEDURE — G8482 FLU IMMUNIZE ORDER/ADMIN: HCPCS | Performed by: STUDENT IN AN ORGANIZED HEALTH CARE EDUCATION/TRAINING PROGRAM

## 2021-01-26 PROCEDURE — 4004F PT TOBACCO SCREEN RCVD TLK: CPT | Performed by: STUDENT IN AN ORGANIZED HEALTH CARE EDUCATION/TRAINING PROGRAM

## 2021-01-26 PROCEDURE — 69210 REMOVE IMPACTED EAR WAX UNI: CPT | Performed by: STUDENT IN AN ORGANIZED HEALTH CARE EDUCATION/TRAINING PROGRAM

## 2021-01-26 PROCEDURE — 3017F COLORECTAL CA SCREEN DOC REV: CPT | Performed by: STUDENT IN AN ORGANIZED HEALTH CARE EDUCATION/TRAINING PROGRAM

## 2021-01-26 PROCEDURE — 92557 COMPREHENSIVE HEARING TEST: CPT | Performed by: AUDIOLOGIST

## 2021-01-26 PROCEDURE — 1123F ACP DISCUSS/DSCN MKR DOCD: CPT | Performed by: STUDENT IN AN ORGANIZED HEALTH CARE EDUCATION/TRAINING PROGRAM

## 2021-01-26 PROCEDURE — 92567 TYMPANOMETRY: CPT | Performed by: AUDIOLOGIST

## 2021-01-26 PROCEDURE — G8417 CALC BMI ABV UP PARAM F/U: HCPCS | Performed by: STUDENT IN AN ORGANIZED HEALTH CARE EDUCATION/TRAINING PROGRAM

## 2021-01-26 PROCEDURE — 99203 OFFICE O/P NEW LOW 30 MIN: CPT | Performed by: STUDENT IN AN ORGANIZED HEALTH CARE EDUCATION/TRAINING PROGRAM

## 2021-01-26 PROCEDURE — 4040F PNEUMOC VAC/ADMIN/RCVD: CPT | Performed by: STUDENT IN AN ORGANIZED HEALTH CARE EDUCATION/TRAINING PROGRAM

## 2021-01-26 NOTE — PROGRESS NOTES
 apixaban (ELIQUIS) 5 MG TABS tablet Take 1 tablet by mouth 2 times daily 180 tablet 2    doxazosin (CARDURA) 2 MG tablet Take 1 tablet by mouth daily 90 tablet 0    gabapentin (NEURONTIN) 300 MG capsule Take 1 capsule by mouth 2 times daily for 90 days. 180 capsule 0    citalopram (CELEXA) 40 MG tablet Take 1 tablet by mouth daily 90 tablet 0    tamsulosin (FLOMAX) 0.4 MG capsule Take 1 capsule by mouth daily 30 capsule 2    finasteride (PROSCAR) 5 MG tablet Take 1 tablet by mouth daily 30 tablet 2    atorvastatin (LIPITOR) 10 MG tablet Take 0.5 tablets by mouth daily 90 tablet 0    aspirin 81 MG EC tablet Take 1 tablet by mouth daily 30 tablet 0    metoprolol tartrate (LOPRESSOR) 25 MG tablet Take 0.5 tablets by mouth 2 times daily 60 tablet 3     No current facility-administered medications for this visit. Review of Systems     REVIEW OF SYSTEMS  The following systems were reviewed and revealed the following in addition to any already discussed in the HPI:    CONSTITUTIONAL: no weight loss, no fever, no night sweats, no chills  EYES: no vision changes, no blurry vision  EARS: See HPI above  NOSE: no epistaxis, no rhinorrhea      PhysicalExam     Vitals:    01/26/21 0911   BP: (!) 140/77   Site: Left Upper Arm   Pulse: 63   Temp: 97.1 °F (36.2 °C)   TempSrc: Temporal       PHYSICAL EXAM  BP (!) 140/77 (Site: Left Upper Arm)   Pulse 63   Temp 97.1 °F (36.2 °C) (Temporal)     GENERAL: No acute distress, alert and oriented, no hoarseness  EYES: EOMI, Anti-icteric  NOSE: On anterior rhinoscopy there is no epistaxis, nasal mucosa moist and normal appearing, no purulent drainage. EARS: Normal external appearance; on portable otomicroscopy:     -As: External auditory canal without stenosis, tympanic membrane clear, no middle ear effusions or retractions.      -Ad: External auditory canal with cerumen impaction, see procedure note below. Claireus Reusing Pneumatic otoscopy: Bilateral tympanic membranes mobile pneumatic otoscopy  FACE: HB 1/6 bilaterally, symmetric appearing, sensation equal bilaterally  ORAL CAVITY: No masses or lesions palpated, uvula is midline, moist mucous membranes, symmetric 2+ tonsils, upper full dentures in place, missing multiple mandibular teeth  NECK: Normal range of motion, no thyromegaly, trachea is midline, no palpable lymphadenopathy or neck masses, no crepitus  CHEST: Normal respiratory effort, breathing comfortably, no retractions  SKIN: No rashes, normal appearing skin, no evidence of skin lesions/tumors  NEURO: Cranial Nerves 2, 3, 4, 5, 6, 7, 11, 12 intact bilaterally     I have performed a head and neck physical exam personally or was physically present during the key or critical portions of the service. Procedure     Procedure: Binocular otomicroscopy with debridement of cerumen impaction    Pre-op: Cerumen impaction of the Right ears. Post op: Same  Procedure : Binocular otomicroscopy with debridement of cerumen  Surgeon: Dr. Elo Moss DO  Estimated Blood Loss: None    Description of Procedure:    After obtaining consent, the patient was placed in the examination chair in the reclined position.      -Right ear: External auditory canal with occluding cerumen limiting visualization of the tympanic membrane which was removed with use of #7 and #5 [place] suction, alligator forceps, and cerumen loop curet. After successful removal of cerumen, the left tympanic membrane was visualized and without significant retractions or cholesteatoma, no middle ear effusions. * Patient tolerated the procedure well with no complications    Data/Imaging Review     Audiogram was performed in the office today by Mendoza SMITH which demonstrated:    As: Moderate sloping to mild sloping to severe sensorineural hearing loss    Ad: Mild sloping to moderate sensorineural hearing    WRS 90% right, WRS 90% left. Type As tympanogram bilaterally. Assessment and Plan     1. Asymmetrical hearing loss, unspecified laterality  Offered MRI to rule out vestibular schwannoma given asymmetry of loss. He would like to hold off on this at this time and proceed with yearly audiogram surveillance. 2. Sensorineural hearing loss (SNHL) of both ears  - Would benefit from amplification with hearing aids. Provided with hearing aid clearance form as well as copy of audiogram.  - Encouraged loud noise avoidance and wearing of hearing protection when exposed. - Recommend surveillance of hearing with yearly audiogram.    3. Tinnitus of both ears  - Discussed tinnitus masking techniques (eg. Fan running in the room, radio tuned between stations giving white noise, light music, or white noise machine or a noise generator)    4. Impacted cerumen of right ear  - Cerumen debrided in the office today. Symptoms resolved post debridement. - Avoid Q-tip and self instrumentation of ears  - Hydrogen peroxide or Debrox (OTC) drops as needed or once every other week to help break up and soften wax  - Follow-up as needed for repeat debridement      Follow Up     Return in about 1 year (around 1/26/2022) for repeat audio. Liliana Peralta   Department of Otolaryngology/Head & Neck Surgery  1/26/21    Medical Decision Making: The following items were considered in medical decision making:  Independent review of images  Review / order clinical lab tests  Review / order radiology tests  Decision to obtain old records    Portions of this note were dictated using Dragon.  There may be linguistic errors secondary to the use of this program.

## 2021-01-26 NOTE — PROGRESS NOTES
[facility]  Division of Audiology/Otolaryngology        PCP: Elina Snyder MD  MRN:     CHIEF COMPLAINT: Hearing Loss, Tinnitus      HISTORY OF PRESENT ILLNESS  Armando Goode was seen for hearing and middle ear evaluation. Otolaryngology is referral source of today's appointment. Patient presents with hearing concerns of which he reports as debilitating. Occupational noise exposure reported. AUDIOGRAM & IMMITTANCE    Hearing ranges from moderate to severe loss. The loss is of sensory nature. Loss is asymmetrical and greater from left. There is a 55 dB difference between ears at 2 kHz. Good speech discernment demonstrated in quiet, at elevated levels. Immittance testing is consistent with normal middle ear status (Type A tympanogram). Some ipsilateral acoustic reflexes present.                RECOMMENDATIONS / PLAN:    ENT to advise

## 2021-01-27 DIAGNOSIS — G47.33 OBSTRUCTIVE SLEEP APNEA SYNDROME: Primary | ICD-10-CM

## 2021-02-02 ENCOUNTER — TELEPHONE (OUTPATIENT)
Dept: PULMONOLOGY | Age: 76
End: 2021-02-02

## 2021-02-02 NOTE — TELEPHONE ENCOUNTER
LM on VM for patient to call back for his PSG results    AHI 31.5  Lowest Sa02 77%  Recommended to have a titration study

## 2021-02-04 RX ORDER — TRAZODONE HYDROCHLORIDE 50 MG/1
50 TABLET ORAL ONCE
Qty: 1 TABLET | Refills: 0 | Status: SHIPPED | OUTPATIENT
Start: 2021-02-04 | End: 2021-02-17 | Stop reason: ALTCHOICE

## 2021-02-04 NOTE — TELEPHONE ENCOUNTER
I sent a prescription for Trazodone to patient's pharmacy to be taken on the night of CPAP titration study. Thanks.

## 2021-02-08 ENCOUNTER — TELEPHONE (OUTPATIENT)
Dept: PULMONOLOGY | Age: 76
End: 2021-02-08

## 2021-02-16 NOTE — PROGRESS NOTES
[address]   Electrophysiology   Date: 2/17/2021  I had the privilege of visiting Justyna Oneal in the office. CC: PAF  HPI: Justyna Oneal is a 77 y.o. male with a PMH of atrial fibrillation, HTN and HLD.     In September of 2020, pt presented to hospital with palpitations and tachycardia at his PCP office. Found to be in atrial fibrillation on EKG and was sent to the ER. He was recently taking off his BB due to asymptomatic bradycardia during a urology procedure    Interval History:     Estrella Parisi presents to the office in follow up. We discussed his heart monitor that showed afib RVR 31 % afib burden but has some episodes of bradycardia. He does feel tired and dizzy at times that he relates to the episodes of low heart rate and perhaps some of his A. fib and RVR episodes. Past Medical History:   Diagnosis Date    Hypercholesteremia     Hypertension         Past Surgical History:   Procedure Laterality Date    HEMORRHOID SURGERY      UROLIFT    SHOULDER SURGERY Right     RTC        Allergies: Allergies   Allergen Reactions    Shellfish Allergy     Augmentin [Amoxicillin-Pot Clavulanate] Nausea And Vomiting       Social History:  Reviewed. reports that he has been smoking cigarettes. He has a 25.00 pack-year smoking history. He quit smokeless tobacco use about 10 years ago. He reports that he does not drink alcohol or use drugs. Family History:  Reviewed. family history includes Cancer in an other family member; High Blood Pressure in an other family member; Stroke in an other family member. Review of System:  All other systems reviewed and are negative except for that noted above.  Pertinent negatives are:     · General: negative for fever, chills   · Ophthalmic ROS: negative for - eye pain or loss of vision  · ENT ROS: negative for - headaches, sore throat   · Respiratory: negative for - cough, sputum  · Cardiovascular: Reviewed in HPI  · Gastrointestinal: negative for - abdominal pain, diarrhea, N/V  · Hematology: negative for - bleeding, blood clots, bruising or jaundice  · Genito-Urinary:  negative for - Dysuria or incontinence  · Musculoskeletal: negative for - Joint swelling, muscle pain  · Neurological: negative for - confusion, dizziness, headaches   · Psychiatric: No anxiety, no depression. · Dermatological: negative for - rash    Physical Examination:  Vitals:    21 1311   BP: 118/73   Pulse: 66   SpO2: 96%        · Constitutional: Oriented. No distress. · Head: Normocephalic and atraumatic. · Mouth/Throat: Oropharynx is clear and moist.   · Eyes: Conjunctivae normal. EOM are normal.   · Neck: Neck supple. No rigidity. No JVD present. · Cardiovascular: Normal rate, regular rhythm, S1&S2. · Pulmonary/Chest: Bilateral respiratory sounds. No wheezes, No rhonchi. · Abdominal: Soft. Bowel sounds present. No distension, No tenderness. · Musculoskeletal: No tenderness. No edema    · Lymphadenopathy: Has no cervical adenopathy. · Neurological: Alert and oriented. Cranial nerve appears intact, No Gross deficit   · Skin: Skin is warm and dry. No rash noted. · Psychiatric: Has a normal behavior     Labs, diagnostic and imaging results reviewed. Lab Results   Component Value Date    TSHREFLEX 0.97 2020    CREATININE 0.9 2020    CREATININE 0.9 2020    AST 20 2020    ALT 11 2020       EC21  Sinus Rhythm with Multiform PVC's     Echo: 2020   Normal left ventricle size and systolic function with an estimated ejection   fraction of 55%.   There is borderline concentric left ventricular hypertrophy.   No definitive regional wall motion abnormalities are seen.   Indeterminate diastolic function 2/2 irregular rhythm.   The aortic valve appears sclerotic but opens well.   Mild tricuspid regurgitation.  PASP estimated at 40-45 mmHg.   Trivial mitral regurgitation.     GXT: 2020   Small sized inferior apical fixed defect of moderate intensity consistent    with infarction.        Reduced LV systolic function with calculated ejection fraction of 47%.        Overall findings represent a intermediate risk scan.      Cath: 9/23/2020  Anatomy:   LM-NML   LAD-mid 20%  Cx-nml  OM- nml  RCA-Dominant mid 20%  RPDA- nml  LVEF- 50%  LVG- nml  LVEDP- 10     Contrast: 75  Flouro Time: 2.4  Access: R radial     Impression  ~Coronary Angiography w/ Nonobstructive CAD  ~LVG with LVEF of 50 and no regional wall motion abnormalities     Event Monitor: 9/2020  31% afib burden with episodes of RVR    Medication:  Current Outpatient Medications   Medication Sig Dispense Refill    apixaban (ELIQUIS) 5 MG TABS tablet Take 1 tablet by mouth 2 times daily 180 tablet 2    doxazosin (CARDURA) 2 MG tablet Take 1 tablet by mouth daily 90 tablet 0    gabapentin (NEURONTIN) 300 MG capsule Take 1 capsule by mouth 2 times daily for 90 days. 180 capsule 0    citalopram (CELEXA) 40 MG tablet Take 1 tablet by mouth daily 90 tablet 0    tamsulosin (FLOMAX) 0.4 MG capsule Take 1 capsule by mouth daily 30 capsule 2    finasteride (PROSCAR) 5 MG tablet Take 1 tablet by mouth daily 30 tablet 2    atorvastatin (LIPITOR) 10 MG tablet Take 0.5 tablets by mouth daily 90 tablet 0    aspirin 81 MG EC tablet Take 1 tablet by mouth daily 30 tablet 0    metoprolol tartrate (LOPRESSOR) 25 MG tablet Take 0.5 tablets by mouth 2 times daily 60 tablet 3     No current facility-administered medications for this visit. Assessment and plan:        Paroxysmal Atrial Fibrillation   - ECG today shows Sinus rhythm with PVC's   - MCOT 9/23/2020 showed 31% afib burden with episodes of RVR   - Continue metoprolol 12.5 mg BID   - [id number] score: 2 (Age, HTN) ; [id number] Vasc score and anticoagulation discussed. High risk for stroke and thromboembolism. Anticoagulation is recommended. Risk of bleeding was discussed. On Coumadin; INRs monitored by [facility] anti-coagulation clinic    - Afib risk factors including age, HTN, obesity, inactivity and GORDON were discussed with patient. Risk factor modification recommended. TSH 0.97 (9/20)   -We discussed treatment options including antiarrhythmics, rate control with anticoagulation, and ablation.   -We discussed progressive nature of atrial fibrillation. Treatment success decreases when AF becomes persistent and last more than 6 months.    -Antiarrhythmic therapy, side effects, benefits and alternative discussed.     -Atrial fibrillation ablation procedure was discussed. We discussed the need for repeat procedure. On average patients may need more than one ablation procedure.     -Risks associated with ablation include but not limited to allergic reaction to the medications, pain, bleeding, infection, nerve injury, injury to diaphragm(breathing muscle), pulmonary embolus(blood clot in lungs), deep vein blood clot, pneumothorax, hemothorax, acute renal failure, cardiac perforation,  tamponade, need for emergent surgery (open heart), permanent pacemaker, pulmonary vein stenosis, left atrial to esophageal fistula, stroke, myocardial infarction and death. Difference between atrial fibrillation and atrial flutter discussed and treatment discussed.    -He is agreeable to proceeding with the atrial fibrillation ablation      When he is in A. fib his heart rates are very fast.He is  symptomatic with them. The problem is that he is bradycardic at other times and therefore increasing his beta-blocker dose or adding any other medication or antiarrhythmic drugs is not optional.  Therefore at this point we either can proceed with a pacemaker and then treat him medically or proceed with ablation of atrial fibrillation. He preferred to proceed with ablation at this point and then we will monitor his heart rate afterwards.     Bradycardia   -Makes control of Afib RVR difficult    -Discussed treatment options including doing an ablation of atrial fibrillation vs placing a pacemaker and continuing with medical management   -Recently diagnosed with severe GORDON and working on titration of new machine   -Follows with pulmonology     HTN   - Controlled: Goal <130/80   - Encouraged to monitor and log BP readings    - Discussed importance of low sodium diet, weight control and exercise     Hyperlipidemia              - On atorvastatin 5 mg QD              - Discussed diet, weight loss, and exercise needs              - Followed per PCP      CAD              - Mild non obstructive CAD per cath (9/22/20)              - Stable              - On metoprolol 12.5 mg BID and lipitor 5 mg daily     GORDON              - Multiple risk factors for GORDON              - Diagnosed with severe GORDON 1/19/2021              - Encouraged compliance on CPAP     Tobacco Abuse              - Discussed benefits of cessation and risks of continued use              - Advised to discuss counseling for cessation with PCP         Plan:  Schedule ablation of afib, cryoablation    Thank you for allowing me to participate in the care of Neymar Avendano. Further evaluation will be based upon the patient's clinical course and testing results. All questions and concerns were addressed to the patient/family. Alternatives to my treatment were discussed. I have discussed the above stated plan and the patient verbalized understanding and agreed with the plan. NOTE: This report was transcribed using voice recognition software. Every effort was made to ensure accuracy, however, inadvertent computerized transcription errors may be present. Saad Wright MD, [address]   Office: [phone]    Scribe attestation:  This note was scribed in the presence of Saad Wright MD by Avni Rothman RN    I, Dr. Saad Wright personally performed the services described in this documentation as scribed by RN in my presence, and it is both accurate and complete.

## 2021-02-17 ENCOUNTER — OFFICE VISIT (OUTPATIENT)
Dept: CARDIOLOGY CLINIC | Age: 76
End: 2021-02-17
Payer: MEDICARE

## 2021-02-17 VITALS
HEART RATE: 66 BPM | DIASTOLIC BLOOD PRESSURE: 73 MMHG | BODY MASS INDEX: 27.96 KG/M2 | WEIGHT: 188.8 LBS | OXYGEN SATURATION: 96 % | HEIGHT: 69 IN | SYSTOLIC BLOOD PRESSURE: 118 MMHG

## 2021-02-17 DIAGNOSIS — G47.33 OSA (OBSTRUCTIVE SLEEP APNEA): ICD-10-CM

## 2021-02-17 DIAGNOSIS — I48.91 ATRIAL FIBRILLATION, UNSPECIFIED TYPE (HCC): ICD-10-CM

## 2021-02-17 DIAGNOSIS — R00.1 BRADYCARDIA: ICD-10-CM

## 2021-02-17 DIAGNOSIS — I48.0 PAROXYSMAL ATRIAL FIBRILLATION (HCC): Primary | ICD-10-CM

## 2021-02-17 DIAGNOSIS — I10 BENIGN ESSENTIAL HTN: ICD-10-CM

## 2021-02-17 PROCEDURE — 99215 OFFICE O/P EST HI 40 MIN: CPT | Performed by: INTERNAL MEDICINE

## 2021-02-17 PROCEDURE — G8427 DOCREV CUR MEDS BY ELIG CLIN: HCPCS | Performed by: INTERNAL MEDICINE

## 2021-02-17 PROCEDURE — 3017F COLORECTAL CA SCREEN DOC REV: CPT | Performed by: INTERNAL MEDICINE

## 2021-02-17 PROCEDURE — 4004F PT TOBACCO SCREEN RCVD TLK: CPT | Performed by: INTERNAL MEDICINE

## 2021-02-17 PROCEDURE — 1123F ACP DISCUSS/DSCN MKR DOCD: CPT | Performed by: INTERNAL MEDICINE

## 2021-02-17 PROCEDURE — 4040F PNEUMOC VAC/ADMIN/RCVD: CPT | Performed by: INTERNAL MEDICINE

## 2021-02-17 PROCEDURE — G8417 CALC BMI ABV UP PARAM F/U: HCPCS | Performed by: INTERNAL MEDICINE

## 2021-02-17 PROCEDURE — 93000 ELECTROCARDIOGRAM COMPLETE: CPT | Performed by: INTERNAL MEDICINE

## 2021-02-17 PROCEDURE — G8482 FLU IMMUNIZE ORDER/ADMIN: HCPCS | Performed by: INTERNAL MEDICINE

## 2021-02-17 NOTE — LETTER
[address]  EP Procedure Sheet    2/17/21  Clay Wilkins  1945  EP Procedures   Pacemaker implant (single/dual)  EP Study    ICD implant (single/dual) xxx Atrial flutter ablation (RICH Y/N)    Biv implant ICD  Tilt Table    Biv implant PPM xxx Atrial fibrillation ablation (RICH Yes)    Generator Change (PPM/ICD/BiV)  SVT ablation    Lead revision (RV/LA/RA) (<1 month)  VT ablation      Lead extraction +/- upgrade (BiV/PPM/ICD)  VT Ischemic/ non-ischemic    Loop implant/ removal  VT RVOT    Cardioversion  VT Left sided    RICH  AVN ablation     Equipment   [employer]tronic   OSCAR Mapping System    [employer] xxx Carto Mapping System    [employer] xxx CryoAblation    Biotronik  Laser Lead Extraction     EP Procedures Scheduling Request  # hours Requested   Scheduled  Date:   Specific Day  Completed    Anesthesia Yes F/u Date:   CT surgery backup  COVID     Overnight stay      Location [facility]       Pre-Procedure Labs / Imaging   PT/INR  Type & cross    CBC  Units PRBC    BMP/Mg  Units FFP    Venogram  Cardiac CTA for Pulmonary vein mapping     RN INITIALS: RA    Patient Instructions  Do not eat or drink after midnight the night prior to procedure  Dx:AFIB/Flutter  Hold Eliquis for night before and morning of procedure

## 2021-02-17 NOTE — PATIENT INSTRUCTIONS
ATRIAL FIB ABLATION INFORMATION    Our  will be contacting you from [phone] to schedule your procedure. The morning of your ablation you will need to check in at the registration desk in the main lobby. PRE-PROCEDURE INSTRUCTIONS -   -You will be called with a time to arrive for you ablation.  -Do not eat or drink anything after midnight the night before your ablation.  -You may take all of your other medications with a sip of water.  -You may be staying overnight in CVU after you ablation.  -You will need to have a responsible adult to drive you home the next morning. -CVU will provide you with discharge instructions.  -You will need to hold your Eliquis for the night before and morning of  procedure    You will be scheduled for a 6-8 week follow up with cardiology. This will be done prior to your discharge instructions. If you have any questions regarding the procedure itself or medications, please call [phone] and ask to speak to an EP nurse.

## 2021-02-18 ENCOUNTER — OFFICE VISIT (OUTPATIENT)
Dept: PRIMARY CARE CLINIC | Age: 76
End: 2021-02-18
Payer: MEDICARE

## 2021-02-18 DIAGNOSIS — Z01.818 PREOP EXAMINATION: Primary | ICD-10-CM

## 2021-02-18 PROCEDURE — G8428 CUR MEDS NOT DOCUMENT: HCPCS | Performed by: NURSE PRACTITIONER

## 2021-02-18 PROCEDURE — 99211 OFF/OP EST MAY X REQ PHY/QHP: CPT | Performed by: NURSE PRACTITIONER

## 2021-02-18 PROCEDURE — G8417 CALC BMI ABV UP PARAM F/U: HCPCS | Performed by: NURSE PRACTITIONER

## 2021-02-19 LAB — SARS-COV-2: NOT DETECTED

## 2021-02-22 ENCOUNTER — HOSPITAL ENCOUNTER (OUTPATIENT)
Dept: SLEEP CENTER | Age: 76
Discharge: HOME | End: 2021-02-22
Payer: MEDICARE

## 2021-02-22 DIAGNOSIS — G47.33 OBSTRUCTIVE SLEEP APNEA SYNDROME: ICD-10-CM

## 2021-02-22 PROCEDURE — 95811 POLYSOM 6/>YRS CPAP 4/> PARM: CPT

## 2021-02-22 PROCEDURE — 95811 POLYSOM 6/>YRS CPAP 4/> PARM: CPT | Performed by: INTERNAL MEDICINE

## 2021-02-23 ENCOUNTER — TELEPHONE (OUTPATIENT)
Dept: PULMONOLOGY | Age: 76
End: 2021-02-23

## 2021-02-23 NOTE — PROGRESS NOTES
Trini Petit         : 1945        PHONE: [phone] (home)     Diagnosis: [x] GORDON (G47.33) [] CSA (G47.31) [] Apnea (G47.30)   Length of Need: [] 12 Months [x] 99 Months [] Other:    Machine (ERIK!): [] Respironics Dream Station      Auto [x] ResMed AirSense     Auto [] Other:     []  CPAP () [x] Bilevel ()   Mode: [] Auto [] Spontaneous    Mode: [x] Auto [] Spontaneous            EPAPmin: 13  PSmin: 4  PSmax: 8  IPAPmax: 25     Comfort Settings:   - Ramp Pressure: 8 cmH2O                                        - Ramp time: 15 min                                     -  Flex/EPR - 3 full time                                    - For ResMed Bilevel (TiMax-4 sec   TiMin- 0.2 sec)     Humidifier: [x] Heated ()        [] Water chamber replacement ()/ 1 per 6 months        Mask:   [x] Nasal () /1 per 3 months [x] Full Face () /1 per 3 months   [] Patient choice -Size and fit mask [] Patient Choice - Size and fit mask   [] Dispense:  [x] Dispense: Dreamwear medium. [] Headgear () / 1 per 3 months [x] Headgear () / 1 per 3 months   [] Replacement Nasal Cushion ()/2 per month [x] Interface Replacement ()/1 per month   [] Replacement Nasal Pillows ()/2 per month         Tubing: [] Heated ()/1 per 3 months    [x] Standard ()/1 per 3 months [] Other:           Filters: [x] Non-disposable ()/1 per 6 months     [] Ultra-Fine, Disposable ()/2 per month        Miscellaneous: [] Chin Strap ()/ 1 per 6 months [] O2 bleed-in:       LPM   [x] Oximetry on CPAP/Bilevel []  Other:          Start Order Date: 21    MEDICAL JUSTIFICATION:  I, the undersigned, certify that the above prescribed supplies are medically necessary for this patients wellbeing. In my opinion, the supplies are both reasonable and necessary in reference to accepted standards of medicalpractice in treatment of this patients condition.     Eugenia Ayala MD      NPI: [id number]       Order Signed Date: 02/23/21    Electronically signed by Gala Sams MD on 2/23/2021 at 1:59 PM

## 2021-02-23 NOTE — TELEPHONE ENCOUNTER
Patient was informed of the titration results  DME choice was [employer]  31-90 day f/u was scheduled patient was told to bring unit to his appt

## 2021-02-27 ENCOUNTER — HOSPITAL ENCOUNTER (EMERGENCY)
Age: 76
Discharge: HOME | End: 2021-02-27
Payer: MEDICARE

## 2021-02-27 ENCOUNTER — APPOINTMENT (OUTPATIENT)
Dept: CT IMAGING | Age: 76
End: 2021-02-27
Payer: MEDICARE

## 2021-02-27 VITALS
DIASTOLIC BLOOD PRESSURE: 59 MMHG | TEMPERATURE: 97.6 F | BODY MASS INDEX: 27.88 KG/M2 | SYSTOLIC BLOOD PRESSURE: 140 MMHG | HEART RATE: 59 BPM | WEIGHT: 188.25 LBS | RESPIRATION RATE: 22 BRPM | OXYGEN SATURATION: 97 % | HEIGHT: 69 IN

## 2021-02-27 DIAGNOSIS — N28.1 CYST OF LEFT KIDNEY: ICD-10-CM

## 2021-02-27 DIAGNOSIS — R31.0 GROSS HEMATURIA: Primary | ICD-10-CM

## 2021-02-27 LAB
A/G RATIO: 1.6 (ref 1.1–2.2)
ALBUMIN SERPL-MCNC: 4.1 G/DL (ref 3.4–5)
ALP BLD-CCNC: 85 U/L (ref 40–129)
ALT SERPL-CCNC: 19 U/L (ref 10–40)
ANION GAP SERPL CALCULATED.3IONS-SCNC: 10 MMOL/L (ref 3–16)
APTT: 21.1 SEC (ref 24.2–36.2)
AST SERPL-CCNC: 20 U/L (ref 15–37)
BASOPHILS ABSOLUTE: 0.1 K/UL (ref 0–0.2)
BASOPHILS RELATIVE PERCENT: 1.3 %
BILIRUB SERPL-MCNC: 0.9 MG/DL (ref 0–1)
BILIRUBIN URINE: NEGATIVE
BLOOD, URINE: ABNORMAL
BUN BLDV-MCNC: 14 MG/DL (ref 7–20)
CALCIUM SERPL-MCNC: 8.8 MG/DL (ref 8.3–10.6)
CHLORIDE BLD-SCNC: 99 MMOL/L (ref 99–110)
CLARITY: CLEAR
CO2: 23 MMOL/L (ref 21–32)
COLOR: YELLOW
CREAT SERPL-MCNC: 0.9 MG/DL (ref 0.8–1.3)
EOSINOPHILS ABSOLUTE: 0.1 K/UL (ref 0–0.6)
EOSINOPHILS RELATIVE PERCENT: 0.5 %
EPITHELIAL CELLS, UA: 0 /HPF (ref 0–5)
GFR AFRICAN AMERICAN: >60
GFR NON-AFRICAN AMERICAN: >60
GLOBULIN: 2.5 G/DL
GLUCOSE BLD-MCNC: 113 MG/DL (ref 70–99)
GLUCOSE URINE: NEGATIVE MG/DL
HCT VFR BLD CALC: 44.5 % (ref 40.5–52.5)
HEMOGLOBIN: 14.8 G/DL (ref 13.5–17.5)
HYALINE CASTS: 0 /LPF (ref 0–8)
INR BLD: 1.05 (ref 0.86–1.14)
KETONES, URINE: NEGATIVE MG/DL
LEUKOCYTE ESTERASE, URINE: NEGATIVE
LYMPHOCYTES ABSOLUTE: 4.6 K/UL (ref 1–5.1)
LYMPHOCYTES RELATIVE PERCENT: 40.7 %
MCH RBC QN AUTO: 30.3 PG (ref 26–34)
MCHC RBC AUTO-ENTMCNC: 33.3 G/DL (ref 31–36)
MCV RBC AUTO: 91 FL (ref 80–100)
MICROSCOPIC EXAMINATION: YES
MONOCYTES ABSOLUTE: 0.6 K/UL (ref 0–1.3)
MONOCYTES RELATIVE PERCENT: 5 %
NEUTROPHILS ABSOLUTE: 5.9 K/UL (ref 1.7–7.7)
NEUTROPHILS RELATIVE PERCENT: 52.5 %
NITRITE, URINE: NEGATIVE
PDW BLD-RTO: 13.8 % (ref 12.4–15.4)
PH UA: 7.5 (ref 5–8)
PLATELET # BLD: 184 K/UL (ref 135–450)
PLATELET SLIDE REVIEW: ADEQUATE
PMV BLD AUTO: 8.8 FL (ref 5–10.5)
POTASSIUM SERPL-SCNC: 4.4 MMOL/L (ref 3.5–5.1)
PROTEIN UA: NEGATIVE MG/DL
PROTHROMBIN TIME: 12.2 SEC (ref 10–13.2)
RBC # BLD: 4.89 M/UL (ref 4.2–5.9)
RBC UA: 3 /HPF (ref 0–4)
SLIDE REVIEW: ABNORMAL
SODIUM BLD-SCNC: 132 MMOL/L (ref 136–145)
SPECIFIC GRAVITY UA: 1 (ref 1–1.03)
TOTAL PROTEIN: 6.6 G/DL (ref 6.4–8.2)
URINE REFLEX TO CULTURE: ABNORMAL
URINE TYPE: ABNORMAL
UROBILINOGEN, URINE: 0.2 E.U./DL
WBC # BLD: 11.2 K/UL (ref 4–11)
WBC UA: 1 /HPF (ref 0–5)

## 2021-02-27 PROCEDURE — 85025 COMPLETE CBC W/AUTO DIFF WBC: CPT

## 2021-02-27 PROCEDURE — 85610 PROTHROMBIN TIME: CPT

## 2021-02-27 PROCEDURE — 81001 URINALYSIS AUTO W/SCOPE: CPT

## 2021-02-27 PROCEDURE — 85730 THROMBOPLASTIN TIME PARTIAL: CPT

## 2021-02-27 PROCEDURE — 80053 COMPREHEN METABOLIC PANEL: CPT

## 2021-02-27 PROCEDURE — 74176 CT ABD & PELVIS W/O CONTRAST: CPT

## 2021-02-27 PROCEDURE — 51798 US URINE CAPACITY MEASURE: CPT

## 2021-02-27 PROCEDURE — 36415 COLL VENOUS BLD VENIPUNCTURE: CPT

## 2021-02-27 PROCEDURE — 99283 EMERGENCY DEPT VISIT LOW MDM: CPT

## 2021-02-27 ASSESSMENT — ENCOUNTER SYMPTOMS
CONSTIPATION: 0
SHORTNESS OF BREATH: 0
NAUSEA: 0
STRIDOR: 0
RECTAL PAIN: 0
WHEEZING: 0
BLOOD IN STOOL: 0
ANAL BLEEDING: 0
ABDOMINAL DISTENTION: 0
COLOR CHANGE: 0
VOMITING: 0
BACK PAIN: 0
DIARRHEA: 0
COUGH: 0
ABDOMINAL PAIN: 0

## 2021-02-27 NOTE — ED PROVIDER NOTES
[facility]        Pt Name: Charlie Baez  MRN: [id number]  [place] 1945  Date of evaluation: 2/27/2021  Provider: Danielle Ronquillo PA-C  PCP: Prema Carl MD    LUIS. I have evaluated this patient. My supervising physician was available for consultation. CHIEF COMPLAINT       Chief Complaint   Patient presents with    Hematuria     Noted darked urine at 0530 & several clots in his urine hours later. Describes abdominal \"tightness. \"  Hx of urolift procedure. Started taking Eliquis in October. HISTORY OF PRESENT ILLNESS   (Location, Timing/Onset, Context/Setting, Quality, Duration, Modifying Factors, Severity, Associated Signs and Symptoms)  Note limiting factors. Charlie Baez is a 77 y.o. male with history of hypertension, hyperlipidemia, atrial fibrillation who is anticoagulated on Eliquis who presents to the emergency department complaining of tea colored urine starting this morning at 5:30 AM.  About 2 hours later, he noticed that he was passing blood clots when he urinated. He took a shower later in the morning and states that he is now peeing bright red blood with a few clots. He has never had this before. He denies any pain, distention or retention associated with this. Denies headache, dizziness, lightheadedness, fever or chills. Nursing Notes were all reviewed and agreed with or any disagreements were addressed in the HPI. REVIEW OF SYSTEMS    (2-9 systems for level 4, 10 or more for level 5)     Review of Systems   Constitutional: Negative for chills and fever. HENT: Negative. Eyes: Negative for visual disturbance. Respiratory: Negative for cough, shortness of breath, wheezing and stridor. Cardiovascular: Negative for chest pain, palpitations and leg swelling. Gastrointestinal: Negative for abdominal distention, abdominal pain, anal bleeding, blood in stool, constipation, diarrhea, nausea, rectal pain and vomiting. Genitourinary: Positive for hematuria. Negative for decreased urine volume, difficulty urinating, discharge, dysuria, flank pain, frequency, genital sores, penile pain, penile swelling, scrotal swelling, testicular pain and urgency. Musculoskeletal: Negative for back pain, neck pain and neck stiffness. Skin: Negative for color change, pallor, rash and wound. Neurological: Negative. All other systems reviewed and are negative. Positives and Pertinent negatives as per HPI. Except as noted above in the ROS, all other systems were reviewed and negative. PAST MEDICAL HISTORY     Past Medical History:   Diagnosis Date    Hypercholesteremia     Hypertension          SURGICAL HISTORY     Past Surgical History:   Procedure Laterality Date    HEMORRHOID SURGERY      UROLIFT    SHOULDER SURGERY Right     RTC          CURRENTMEDICATIONS       Previous Medications    APIXABAN (ELIQUIS) 5 MG TABS TABLET    Take 1 tablet by mouth 2 times daily    ASPIRIN 81 MG EC TABLET    Take 1 tablet by mouth daily    ATORVASTATIN (LIPITOR) 10 MG TABLET    Take 0.5 tablets by mouth daily    CITALOPRAM (CELEXA) 40 MG TABLET    Take 1 tablet by mouth daily    DOXAZOSIN (CARDURA) 2 MG TABLET    Take 1 tablet by mouth daily    FINASTERIDE (PROSCAR) 5 MG TABLET    Take 1 tablet by mouth daily    GABAPENTIN (NEURONTIN) 300 MG CAPSULE    Take 1 capsule by mouth 2 times daily for 90 days.     METOPROLOL TARTRATE (LOPRESSOR) 25 MG TABLET    Take 0.5 tablets by mouth 2 times daily    TAMSULOSIN (FLOMAX) 0.4 MG CAPSULE    Take 1 capsule by mouth daily         ALLERGIES     Shellfish allergy and Augmentin [amoxicillin-pot clavulanate]    FAMILYHISTORY       Family History   Problem Relation Age of Onset    Cancer Other     High Blood Pressure Other     Stroke Other Neurological:      Mental Status: He is alert and oriented to person, place, and time. Mental status is at baseline. Psychiatric:         Mood and Affect: Mood normal.         Behavior: Behavior normal.     nurse reports 202 cc of urine in bladder on bladder scan    DIAGNOSTIC RESULTS   LABS:    Labs Reviewed   CBC WITH AUTO DIFFERENTIAL - Abnormal; Notable for the following components:       Result Value    WBC 11.2 (*)     All other components within normal limits    Narrative:     Performed at:  [facility] [address]   Phone [phone]   COMPREHENSIVE METABOLIC PANEL - Abnormal; Notable for the following components:    Sodium 132 (*)     Glucose 113 (*)     All other components within normal limits    Narrative:     Performed at:  [facility] [address]   Phone [phone]   URINE RT REFLEX TO CULTURE - Abnormal; Notable for the following components:    Blood, Urine MODERATE (*)     All other components within normal limits    Narrative:     Performed at:  [facility] [address]   Phone [phone]   APTT - Abnormal; Notable for the following components:    aPTT 21.1 (*)     All other components within normal limits    Narrative:     Performed at:  [facility] [address]   Phone [phone]   PROTIME-INR    Narrative:     Performed at:  [facility] [address]   Phone [phone]   MICROSCOPIC URINALYSIS    Narrative:     Performed at:  [facility] [address]   Phone [phone]       All other labs were within normal range or not returned as of this dictation. This patient presents to the emergency department with gross hematuria. It is painless. Abdomen is soft and nontender in all 4 quadrants without pulsatile mass or CVA tenderness. CT scan of the abdomen and pelvis shows incidental nephrolithiasis, stable appearing chronic mild adenopathy in the upper retroperitoneum, and a stable appearing left kidney cyst since 2017. Urinalysis does not suggest infection. Renal function is preserved. Patient is not acutely anemic. I discussed the case with the urologist.  Carrie Anthony holding the blood thinner for 2 days. Does not recommend Cuellar catheter at this time. Recommends follow-up on Monday morning. This was discussed with the patient who agrees with plan. Vital stable here, and patient is stable for discharge. My suspicion is low for acute surgical abdomen, obstruction, perforation, abscess, mesenteric ischemia, AAA, dissection, cholecystitis, cholangitis, pancreatitis, appendicitis, C. diff colitis, diverticulitis, volvulus, incarcerated hernia, necrotizing fasciitis, TOA, ovarian torsion, PID, ectopic pregnancy, intussusception, testicular torsion, epididymitis, varicocele, hydrocele, orchitis, incarcerated hernia, Sandoval gangrene, pyelonephritis, perinephric abscess, kidney stone, urosepsis, fistula, hypovolemic shock, or other concerning pathology. We have addressed concerns and expectations. FINAL IMPRESSION      1. Gross hematuria    2. Cyst of left kidney          DISPOSITION/PLAN   DISPOSITION Decision To Discharge 02/27/2021 12:04:03 PM      PATIENT REFERREDTO:  Keisha Ortez MD  [facility] [facility] [address]  [phone]      for follow up in 1-3 days    Asha Shaver MD  [facility]  [phone]      follow up with  urology on monday morning    hold eliquis for two days. urine should start to become less red.  if you experience more urine retention or incresaed clots at home, you may return to ED DISCHARGE MEDICATIONS:  New Prescriptions    No medications on file       DISCONTINUED MEDICATIONS:  Discontinued Medications    No medications on file              (Please note that portions of this note were completed with a voice recognition program.  Efforts were made to edit the dictations but occasionally words are mis-transcribed.)    Quentin Oliveira PA-C (electronically signed)            Quentin Oliveira PA-C  02/27/21 [id number]

## 2021-02-27 NOTE — ED NOTES
Bed: 20  Expected date:   Expected time:   Means of arrival:   Comments:  Noelle Mccain RN  02/27/21 [id number]

## 2021-03-31 ENCOUNTER — OFFICE VISIT (OUTPATIENT)
Dept: PRIMARY CARE CLINIC | Age: 76
End: 2021-03-31
Payer: MEDICARE

## 2021-03-31 DIAGNOSIS — Z20.828 EXPOSURE TO SARS-ASSOCIATED CORONAVIRUS: Primary | ICD-10-CM

## 2021-03-31 PROCEDURE — G8428 CUR MEDS NOT DOCUMENT: HCPCS | Performed by: NURSE PRACTITIONER

## 2021-03-31 PROCEDURE — 99211 OFF/OP EST MAY X REQ PHY/QHP: CPT | Performed by: NURSE PRACTITIONER

## 2021-03-31 PROCEDURE — G8417 CALC BMI ABV UP PARAM F/U: HCPCS | Performed by: NURSE PRACTITIONER

## 2021-03-31 NOTE — PATIENT INSTRUCTIONS

## 2021-04-01 LAB — SARS-COV-2: NOT DETECTED

## 2021-04-05 ENCOUNTER — ANESTHESIA (OUTPATIENT)
Dept: CARDIAC CATH/INVASIVE PROCEDURES | Age: 76
End: 2021-04-05
Payer: MEDICARE

## 2021-04-05 ENCOUNTER — HOSPITAL ENCOUNTER (OUTPATIENT)
Dept: CARDIAC CATH/INVASIVE PROCEDURES | Age: 76
Discharge: HOME | End: 2021-04-06
Attending: INTERNAL MEDICINE | Admitting: INTERNAL MEDICINE
Payer: MEDICARE

## 2021-04-05 ENCOUNTER — ANESTHESIA EVENT (OUTPATIENT)
Dept: CARDIAC CATH/INVASIVE PROCEDURES | Age: 76
End: 2021-04-05
Payer: MEDICARE

## 2021-04-05 VITALS
SYSTOLIC BLOOD PRESSURE: 99 MMHG | DIASTOLIC BLOOD PRESSURE: 60 MMHG | TEMPERATURE: 97.5 F | OXYGEN SATURATION: 83 % | RESPIRATION RATE: 9 BRPM

## 2021-04-05 LAB
A/G RATIO: 1.7 (ref 1.1–2.2)
ABO/RH: NORMAL
ALBUMIN SERPL-MCNC: 4 G/DL (ref 3.4–5)
ALP BLD-CCNC: 79 U/L (ref 40–129)
ALT SERPL-CCNC: 13 U/L (ref 10–40)
ANION GAP SERPL CALCULATED.3IONS-SCNC: 9 MMOL/L (ref 3–16)
ANTIBODY SCREEN: NORMAL
AST SERPL-CCNC: 12 U/L (ref 15–37)
BILIRUB SERPL-MCNC: 1 MG/DL (ref 0–1)
BUN BLDV-MCNC: 20 MG/DL (ref 7–20)
CALCIUM SERPL-MCNC: 8.5 MG/DL (ref 8.3–10.6)
CHLORIDE BLD-SCNC: 105 MMOL/L (ref 99–110)
CO2: 25 MMOL/L (ref 21–32)
CREAT SERPL-MCNC: 1.1 MG/DL (ref 0.8–1.3)
EKG ATRIAL RATE: 68 BPM
EKG DIAGNOSIS: NORMAL
EKG P AXIS: 63 DEGREES
EKG P-R INTERVAL: 186 MS
EKG Q-T INTERVAL: 416 MS
EKG QRS DURATION: 106 MS
EKG QTC CALCULATION (BAZETT): 442 MS
EKG R AXIS: 20 DEGREES
EKG T AXIS: 45 DEGREES
EKG VENTRICULAR RATE: 68 BPM
GFR AFRICAN AMERICAN: >60
GFR NON-AFRICAN AMERICAN: >60
GLOBULIN: 2.3 G/DL
GLUCOSE BLD-MCNC: 109 MG/DL (ref 70–99)
HCT VFR BLD CALC: 42.2 % (ref 40.5–52.5)
HEMOGLOBIN: 14.1 G/DL (ref 13.5–17.5)
INR BLD: 0.99 (ref 0.86–1.14)
MAGNESIUM: 1.9 MG/DL (ref 1.8–2.4)
MCH RBC QN AUTO: 30.1 PG (ref 26–34)
MCHC RBC AUTO-ENTMCNC: 33.5 G/DL (ref 31–36)
MCV RBC AUTO: 90 FL (ref 80–100)
PDW BLD-RTO: 13.8 % (ref 12.4–15.4)
PLATELET # BLD: 190 K/UL (ref 135–450)
PMV BLD AUTO: 9 FL (ref 5–10.5)
POC ACT LR: 317 SEC
POC ACT LR: 341 SEC
POC ACT LR: 355 SEC
POC ACT LR: >400 SEC
POTASSIUM SERPL-SCNC: 4.2 MMOL/L (ref 3.5–5.1)
PROTHROMBIN TIME: 11.5 SEC (ref 10–13.2)
RBC # BLD: 4.68 M/UL (ref 4.2–5.9)
SODIUM BLD-SCNC: 139 MMOL/L (ref 136–145)
TOTAL PROTEIN: 6.3 G/DL (ref 6.4–8.2)
WBC # BLD: 10.5 K/UL (ref 4–11)

## 2021-04-05 PROCEDURE — 85347 COAGULATION TIME ACTIVATED: CPT

## 2021-04-05 PROCEDURE — 6370000000 HC RX 637 (ALT 250 FOR IP): Performed by: INTERNAL MEDICINE

## 2021-04-05 PROCEDURE — 36415 COLL VENOUS BLD VENIPUNCTURE: CPT

## 2021-04-05 PROCEDURE — 93613 INTRACARDIAC EPHYS 3D MAPG: CPT

## 2021-04-05 PROCEDURE — 2720000010 HC SURG SUPPLY STERILE

## 2021-04-05 PROCEDURE — C1733 CATH, EP, OTHR THAN COOL-TIP: HCPCS

## 2021-04-05 PROCEDURE — C1730 CATH, EP, 19 OR FEW ELECT: HCPCS

## 2021-04-05 PROCEDURE — 2580000003 HC RX 258

## 2021-04-05 PROCEDURE — 93623 PRGRMD STIMJ&PACG IV RX NFS: CPT | Performed by: INTERNAL MEDICINE

## 2021-04-05 PROCEDURE — 93312 ECHO TRANSESOPHAGEAL: CPT

## 2021-04-05 PROCEDURE — C1732 CATH, EP, DIAG/ABL, 3D/VECT: HCPCS

## 2021-04-05 PROCEDURE — 7100000001 HC PACU RECOVERY - ADDTL 15 MIN

## 2021-04-05 PROCEDURE — 93320 DOPPLER ECHO COMPLETE: CPT

## 2021-04-05 PROCEDURE — 6360000002 HC RX W HCPCS: Performed by: NURSE ANESTHETIST, CERTIFIED REGISTERED

## 2021-04-05 PROCEDURE — 6360000004 HC RX CONTRAST MEDICATION: Performed by: INTERNAL MEDICINE

## 2021-04-05 PROCEDURE — 93662 INTRACARDIAC ECG (ICE): CPT | Performed by: INTERNAL MEDICINE

## 2021-04-05 PROCEDURE — 2580000003 HC RX 258: Performed by: INTERNAL MEDICINE

## 2021-04-05 PROCEDURE — 3700000001 HC ADD 15 MINUTES (ANESTHESIA)

## 2021-04-05 PROCEDURE — 2580000003 HC RX 258: Performed by: NURSE ANESTHETIST, CERTIFIED REGISTERED

## 2021-04-05 PROCEDURE — 86850 RBC ANTIBODY SCREEN: CPT

## 2021-04-05 PROCEDURE — C1894 INTRO/SHEATH, NON-LASER: HCPCS

## 2021-04-05 PROCEDURE — 93662 INTRACARDIAC ECG (ICE): CPT

## 2021-04-05 PROCEDURE — C1769 GUIDE WIRE: HCPCS

## 2021-04-05 PROCEDURE — 86901 BLOOD TYPING SEROLOGIC RH(D): CPT

## 2021-04-05 PROCEDURE — 93325 DOPPLER ECHO COLOR FLOW MAPG: CPT

## 2021-04-05 PROCEDURE — 80053 COMPREHEN METABOLIC PANEL: CPT

## 2021-04-05 PROCEDURE — 83735 ASSAY OF MAGNESIUM: CPT

## 2021-04-05 PROCEDURE — 6360000002 HC RX W HCPCS

## 2021-04-05 PROCEDURE — 85610 PROTHROMBIN TIME: CPT

## 2021-04-05 PROCEDURE — 2500000003 HC RX 250 WO HCPCS

## 2021-04-05 PROCEDURE — 86900 BLOOD TYPING SEROLOGIC ABO: CPT

## 2021-04-05 PROCEDURE — 93622 COMP EP EVAL L VENTR PAC&REC: CPT | Performed by: INTERNAL MEDICINE

## 2021-04-05 PROCEDURE — 7100000000 HC PACU RECOVERY - FIRST 15 MIN

## 2021-04-05 PROCEDURE — 93010 ELECTROCARDIOGRAM REPORT: CPT | Performed by: INTERNAL MEDICINE

## 2021-04-05 PROCEDURE — 3700000000 HC ANESTHESIA ATTENDED CARE

## 2021-04-05 PROCEDURE — 93613 INTRACARDIAC EPHYS 3D MAPG: CPT | Performed by: INTERNAL MEDICINE

## 2021-04-05 PROCEDURE — 93623 PRGRMD STIMJ&PACG IV RX NFS: CPT

## 2021-04-05 PROCEDURE — 93656 COMPRE EP EVAL ABLTJ ATR FIB: CPT | Performed by: INTERNAL MEDICINE

## 2021-04-05 PROCEDURE — 93005 ELECTROCARDIOGRAM TRACING: CPT | Performed by: INTERNAL MEDICINE

## 2021-04-05 PROCEDURE — 93622 COMP EP EVAL L VENTR PAC&REC: CPT

## 2021-04-05 PROCEDURE — 85027 COMPLETE CBC AUTOMATED: CPT

## 2021-04-05 PROCEDURE — 6370000000 HC RX 637 (ALT 250 FOR IP): Performed by: NURSE PRACTITIONER

## 2021-04-05 PROCEDURE — 2500000003 HC RX 250 WO HCPCS: Performed by: NURSE ANESTHETIST, CERTIFIED REGISTERED

## 2021-04-05 PROCEDURE — C1760 CLOSURE DEV, VASC: HCPCS

## 2021-04-05 PROCEDURE — 93656 COMPRE EP EVAL ABLTJ ATR FIB: CPT

## 2021-04-05 PROCEDURE — C1759 CATH, INTRA ECHOCARDIOGRAPHY: HCPCS

## 2021-04-05 RX ORDER — SUCCINYLCHOLINE/SOD CL,ISO/PF 200MG/10ML
SYRINGE (ML) INTRAVENOUS PRN
Status: DISCONTINUED | OUTPATIENT
Start: 2021-04-05 | End: 2021-04-05 | Stop reason: SDUPTHER

## 2021-04-05 RX ORDER — PANTOPRAZOLE SODIUM 40 MG/1
40 TABLET, DELAYED RELEASE ORAL
Qty: 30 TABLET | Refills: 0 | Status: SHIPPED | OUTPATIENT
Start: 2021-04-05 | End: 2021-06-09

## 2021-04-05 RX ORDER — ATORVASTATIN CALCIUM 10 MG/1
5 TABLET, FILM COATED ORAL NIGHTLY
Status: DISCONTINUED | OUTPATIENT
Start: 2021-04-05 | End: 2021-04-06 | Stop reason: HOSPADM

## 2021-04-05 RX ORDER — GABAPENTIN 300 MG/1
300 CAPSULE ORAL 2 TIMES DAILY
Status: DISCONTINUED | OUTPATIENT
Start: 2021-04-05 | End: 2021-04-06 | Stop reason: HOSPADM

## 2021-04-05 RX ORDER — PANTOPRAZOLE SODIUM 40 MG/1
40 TABLET, DELAYED RELEASE ORAL ONCE
Status: COMPLETED | OUTPATIENT
Start: 2021-04-05 | End: 2021-04-05

## 2021-04-05 RX ORDER — DEXAMETHASONE SODIUM PHOSPHATE 4 MG/ML
INJECTION, SOLUTION INTRA-ARTICULAR; INTRALESIONAL; INTRAMUSCULAR; INTRAVENOUS; SOFT TISSUE PRN
Status: DISCONTINUED | OUTPATIENT
Start: 2021-04-05 | End: 2021-04-05 | Stop reason: SDUPTHER

## 2021-04-05 RX ORDER — HYDRALAZINE HYDROCHLORIDE 20 MG/ML
5 INJECTION INTRAMUSCULAR; INTRAVENOUS EVERY 10 MIN PRN
Status: DISCONTINUED | OUTPATIENT
Start: 2021-04-05 | End: 2021-04-06 | Stop reason: HOSPADM

## 2021-04-05 RX ORDER — TAMSULOSIN HYDROCHLORIDE 0.4 MG/1
0.4 CAPSULE ORAL DAILY
Status: DISCONTINUED | OUTPATIENT
Start: 2021-04-05 | End: 2021-04-06 | Stop reason: HOSPADM

## 2021-04-05 RX ORDER — SODIUM CHLORIDE 9 MG/ML
INJECTION, SOLUTION INTRAVENOUS CONTINUOUS PRN
Status: DISCONTINUED | OUTPATIENT
Start: 2021-04-05 | End: 2021-04-05 | Stop reason: SDUPTHER

## 2021-04-05 RX ORDER — DIPHENHYDRAMINE HYDROCHLORIDE 50 MG/ML
INJECTION INTRAMUSCULAR; INTRAVENOUS PRN
Status: DISCONTINUED | OUTPATIENT
Start: 2021-04-05 | End: 2021-04-05 | Stop reason: SDUPTHER

## 2021-04-05 RX ORDER — LIDOCAINE HYDROCHLORIDE 20 MG/ML
INJECTION, SOLUTION EPIDURAL; INFILTRATION; INTRACAUDAL; PERINEURAL PRN
Status: DISCONTINUED | OUTPATIENT
Start: 2021-04-05 | End: 2021-04-05 | Stop reason: SDUPTHER

## 2021-04-05 RX ORDER — FENTANYL CITRATE 50 UG/ML
INJECTION, SOLUTION INTRAMUSCULAR; INTRAVENOUS PRN
Status: DISCONTINUED | OUTPATIENT
Start: 2021-04-05 | End: 2021-04-05 | Stop reason: SDUPTHER

## 2021-04-05 RX ORDER — SODIUM CHLORIDE 0.9 % (FLUSH) 0.9 %
10 SYRINGE (ML) INJECTION EVERY 12 HOURS SCHEDULED
Status: DISCONTINUED | OUTPATIENT
Start: 2021-04-05 | End: 2021-04-06 | Stop reason: HOSPADM

## 2021-04-05 RX ORDER — METHYLPREDNISOLONE SODIUM SUCCINATE 40 MG/ML
INJECTION, POWDER, LYOPHILIZED, FOR SOLUTION INTRAMUSCULAR; INTRAVENOUS PRN
Status: DISCONTINUED | OUTPATIENT
Start: 2021-04-05 | End: 2021-04-05 | Stop reason: SDUPTHER

## 2021-04-05 RX ORDER — CITALOPRAM 20 MG/1
40 TABLET ORAL DAILY
Status: DISCONTINUED | OUTPATIENT
Start: 2021-04-06 | End: 2021-04-06 | Stop reason: HOSPADM

## 2021-04-05 RX ORDER — ACETAMINOPHEN 325 MG/1
650 TABLET ORAL EVERY 4 HOURS PRN
Status: DISCONTINUED | OUTPATIENT
Start: 2021-04-05 | End: 2021-04-06 | Stop reason: HOSPADM

## 2021-04-05 RX ORDER — HEPARIN SODIUM 1000 [USP'U]/ML
INJECTION, SOLUTION INTRAVENOUS; SUBCUTANEOUS PRN
Status: DISCONTINUED | OUTPATIENT
Start: 2021-04-05 | End: 2021-04-05 | Stop reason: SDUPTHER

## 2021-04-05 RX ORDER — ONDANSETRON 2 MG/ML
4 INJECTION INTRAMUSCULAR; INTRAVENOUS
Status: ACTIVE | OUTPATIENT
Start: 2021-04-05 | End: 2021-04-05

## 2021-04-05 RX ORDER — ASPIRIN 81 MG/1
81 TABLET ORAL DAILY
Status: DISCONTINUED | OUTPATIENT
Start: 2021-04-06 | End: 2021-04-06 | Stop reason: HOSPADM

## 2021-04-05 RX ORDER — LABETALOL HYDROCHLORIDE 5 MG/ML
5 INJECTION, SOLUTION INTRAVENOUS EVERY 10 MIN PRN
Status: DISCONTINUED | OUTPATIENT
Start: 2021-04-05 | End: 2021-04-06 | Stop reason: HOSPADM

## 2021-04-05 RX ORDER — FINASTERIDE 5 MG/1
5 TABLET, FILM COATED ORAL DAILY
Status: DISCONTINUED | OUTPATIENT
Start: 2021-04-05 | End: 2021-04-06 | Stop reason: HOSPADM

## 2021-04-05 RX ORDER — SODIUM CHLORIDE 0.9 % (FLUSH) 0.9 %
10 SYRINGE (ML) INJECTION PRN
Status: DISCONTINUED | OUTPATIENT
Start: 2021-04-05 | End: 2021-04-06 | Stop reason: HOSPADM

## 2021-04-05 RX ORDER — HYDROMORPHONE HCL 110MG/55ML
0.25 PATIENT CONTROLLED ANALGESIA SYRINGE INTRAVENOUS EVERY 5 MIN PRN
Status: DISCONTINUED | OUTPATIENT
Start: 2021-04-05 | End: 2021-04-06 | Stop reason: HOSPADM

## 2021-04-05 RX ORDER — FUROSEMIDE 10 MG/ML
INJECTION INTRAMUSCULAR; INTRAVENOUS PRN
Status: DISCONTINUED | OUTPATIENT
Start: 2021-04-05 | End: 2021-04-05 | Stop reason: SDUPTHER

## 2021-04-05 RX ORDER — PANTOPRAZOLE SODIUM 40 MG/1
40 TABLET, DELAYED RELEASE ORAL
Status: DISCONTINUED | OUTPATIENT
Start: 2021-04-06 | End: 2021-04-06 | Stop reason: HOSPADM

## 2021-04-05 RX ORDER — PROPOFOL 10 MG/ML
INJECTION, EMULSION INTRAVENOUS PRN
Status: DISCONTINUED | OUTPATIENT
Start: 2021-04-05 | End: 2021-04-05 | Stop reason: SDUPTHER

## 2021-04-05 RX ORDER — ONDANSETRON 2 MG/ML
INJECTION INTRAMUSCULAR; INTRAVENOUS PRN
Status: DISCONTINUED | OUTPATIENT
Start: 2021-04-05 | End: 2021-04-05 | Stop reason: SDUPTHER

## 2021-04-05 RX ORDER — GLYCOPYRROLATE 1 MG/5 ML
SYRINGE (ML) INTRAVENOUS PRN
Status: DISCONTINUED | OUTPATIENT
Start: 2021-04-05 | End: 2021-04-05 | Stop reason: SDUPTHER

## 2021-04-05 RX ORDER — DOXAZOSIN MESYLATE 1 MG/1
2 TABLET ORAL DAILY
Status: DISCONTINUED | OUTPATIENT
Start: 2021-04-05 | End: 2021-04-06 | Stop reason: HOSPADM

## 2021-04-05 RX ADMIN — METHYLPREDNISOLONE SODIUM SUCCINATE 40 MG: 40 INJECTION, POWDER, FOR SOLUTION INTRAMUSCULAR; INTRAVENOUS at 07:59

## 2021-04-05 RX ADMIN — FENTANYL CITRATE 50 MCG: 50 INJECTION, SOLUTION INTRAMUSCULAR; INTRAVENOUS at 08:19

## 2021-04-05 RX ADMIN — TAMSULOSIN HYDROCHLORIDE 0.4 MG: 0.4 CAPSULE ORAL at 16:28

## 2021-04-05 RX ADMIN — Medication 10 ML: at 20:20

## 2021-04-05 RX ADMIN — FENTANYL CITRATE 50 MCG: 50 INJECTION, SOLUTION INTRAMUSCULAR; INTRAVENOUS at 07:39

## 2021-04-05 RX ADMIN — FENTANYL CITRATE 50 MCG: 50 INJECTION, SOLUTION INTRAMUSCULAR; INTRAVENOUS at 09:15

## 2021-04-05 RX ADMIN — SODIUM CHLORIDE: 9 INJECTION, SOLUTION INTRAVENOUS at 07:33

## 2021-04-05 RX ADMIN — PHENYLEPHRINE HYDROCHLORIDE: 10 INJECTION INTRAVENOUS at 07:50

## 2021-04-05 RX ADMIN — LIDOCAINE HYDROCHLORIDE 100 MG: 20 INJECTION, SOLUTION EPIDURAL; INFILTRATION; INTRACAUDAL; PERINEURAL at 07:39

## 2021-04-05 RX ADMIN — GABAPENTIN 300 MG: 300 CAPSULE ORAL at 20:15

## 2021-04-05 RX ADMIN — ATORVASTATIN CALCIUM 5 MG: 10 TABLET, FILM COATED ORAL at 20:16

## 2021-04-05 RX ADMIN — GABAPENTIN 300 MG: 300 CAPSULE ORAL at 16:29

## 2021-04-05 RX ADMIN — HEPARIN SODIUM 6000 UNITS: 1000 INJECTION, SOLUTION INTRAVENOUS; SUBCUTANEOUS at 09:24

## 2021-04-05 RX ADMIN — ONDANSETRON 4 MG: 2 INJECTION INTRAMUSCULAR; INTRAVENOUS at 07:45

## 2021-04-05 RX ADMIN — ISOPROTERENOL HYDROCHLORIDE 10 MCG/MIN: 0.2 INJECTION, SOLUTION INTRAMUSCULAR; INTRAVENOUS at 10:05

## 2021-04-05 RX ADMIN — METOPROLOL TARTRATE 12.5 MG: 25 TABLET, FILM COATED ORAL at 20:16

## 2021-04-05 RX ADMIN — FENTANYL CITRATE 50 MCG: 50 INJECTION, SOLUTION INTRAMUSCULAR; INTRAVENOUS at 07:57

## 2021-04-05 RX ADMIN — FENTANYL CITRATE 50 MCG: 50 INJECTION, SOLUTION INTRAMUSCULAR; INTRAVENOUS at 08:20

## 2021-04-05 RX ADMIN — PANTOPRAZOLE SODIUM 40 MG: 40 TABLET, DELAYED RELEASE ORAL at 16:30

## 2021-04-05 RX ADMIN — FUROSEMIDE 20 MG: 10 INJECTION, SOLUTION INTRAMUSCULAR; INTRAVENOUS at 10:11

## 2021-04-05 RX ADMIN — DOXAZOSIN 2 MG: 1 TABLET ORAL at 16:28

## 2021-04-05 RX ADMIN — PHENYLEPHRINE HYDROCHLORIDE 200 MCG: 10 INJECTION INTRAVENOUS at 08:45

## 2021-04-05 RX ADMIN — DEXAMETHASONE SODIUM PHOSPHATE 4 MG: 4 INJECTION, SOLUTION INTRAMUSCULAR; INTRAVENOUS at 07:45

## 2021-04-05 RX ADMIN — PHENYLEPHRINE HYDROCHLORIDE 100 MCG: 10 INJECTION INTRAVENOUS at 09:15

## 2021-04-05 RX ADMIN — Medication 0.2 MG: at 07:50

## 2021-04-05 RX ADMIN — FENTANYL CITRATE 50 MCG: 50 INJECTION, SOLUTION INTRAMUSCULAR; INTRAVENOUS at 08:25

## 2021-04-05 RX ADMIN — APIXABAN 5 MG: 5 TABLET, FILM COATED ORAL at 18:35

## 2021-04-05 RX ADMIN — HEPARIN SODIUM 13000 UNITS: 1000 INJECTION, SOLUTION INTRAVENOUS; SUBCUTANEOUS at 08:40

## 2021-04-05 RX ADMIN — FINASTERIDE 5 MG: 5 TABLET, FILM COATED ORAL at 16:28

## 2021-04-05 RX ADMIN — HEPARIN SODIUM 4000 UNITS: 1000 INJECTION, SOLUTION INTRAVENOUS; SUBCUTANEOUS at 09:03

## 2021-04-05 RX ADMIN — DIPHENHYDRAMINE HYDROCHLORIDE 12.5 MG: 50 INJECTION, SOLUTION INTRAMUSCULAR; INTRAVENOUS at 07:59

## 2021-04-05 RX ADMIN — PHENYLEPHRINE HYDROCHLORIDE 100 MCG: 10 INJECTION INTRAVENOUS at 07:50

## 2021-04-05 RX ADMIN — IOPAMIDOL 30 ML: 755 INJECTION, SOLUTION INTRAVENOUS at 10:28

## 2021-04-05 RX ADMIN — Medication 170 MG: at 07:39

## 2021-04-05 RX ADMIN — PROPOFOL 200 MG: 10 INJECTION, EMULSION INTRAVENOUS at 07:39

## 2021-04-05 RX ADMIN — SODIUM CHLORIDE: 9 INJECTION, SOLUTION INTRAVENOUS at 08:40

## 2021-04-05 ASSESSMENT — PULMONARY FUNCTION TESTS
PIF_VALUE: 18
PIF_VALUE: 11
PIF_VALUE: 17
PIF_VALUE: 19
PIF_VALUE: 18
PIF_VALUE: 11
PIF_VALUE: 11
PIF_VALUE: 18
PIF_VALUE: 1
PIF_VALUE: 14
PIF_VALUE: 18
PIF_VALUE: 11
PIF_VALUE: 18
PIF_VALUE: 29
PIF_VALUE: 18
PIF_VALUE: 20
PIF_VALUE: 19
PIF_VALUE: 18
PIF_VALUE: 18
PIF_VALUE: 1
PIF_VALUE: 11
PIF_VALUE: 18
PIF_VALUE: 15
PIF_VALUE: 12
PIF_VALUE: 18
PIF_VALUE: 19
PIF_VALUE: 16
PIF_VALUE: 19
PIF_VALUE: 16
PIF_VALUE: 39
PIF_VALUE: 18
PIF_VALUE: 18
PIF_VALUE: 20
PIF_VALUE: 18
PIF_VALUE: 19
PIF_VALUE: 18
PIF_VALUE: 11
PIF_VALUE: 11
PIF_VALUE: 14
PIF_VALUE: 18
PIF_VALUE: 14
PIF_VALUE: 18
PIF_VALUE: 19
PIF_VALUE: 14
PIF_VALUE: 18
PIF_VALUE: 15
PIF_VALUE: 18
PIF_VALUE: 0
PIF_VALUE: 18
PIF_VALUE: 19
PIF_VALUE: 14
PIF_VALUE: 18
PIF_VALUE: 2
PIF_VALUE: 18
PIF_VALUE: 19
PIF_VALUE: 18
PIF_VALUE: 15
PIF_VALUE: 18
PIF_VALUE: 34
PIF_VALUE: 18
PIF_VALUE: 19
PIF_VALUE: 18
PIF_VALUE: 15
PIF_VALUE: 11
PIF_VALUE: 18
PIF_VALUE: 18
PIF_VALUE: 8
PIF_VALUE: 0
PIF_VALUE: 32
PIF_VALUE: 20
PIF_VALUE: 18
PIF_VALUE: 11
PIF_VALUE: 18
PIF_VALUE: 20
PIF_VALUE: 14
PIF_VALUE: 18
PIF_VALUE: 1
PIF_VALUE: 18
PIF_VALUE: 16
PIF_VALUE: 14
PIF_VALUE: 18
PIF_VALUE: 1
PIF_VALUE: 18
PIF_VALUE: 4
PIF_VALUE: 18
PIF_VALUE: 3

## 2021-04-05 ASSESSMENT — LIFESTYLE VARIABLES: SMOKING_STATUS: 1

## 2021-04-05 ASSESSMENT — PAIN SCALES - GENERAL
PAINLEVEL_OUTOF10: 0
PAINLEVEL_OUTOF10: 0

## 2021-04-05 NOTE — PROGRESS NOTES
Pt arrived from cath lab to PACU, oral airway in place. VSS, O2 sats 92% on 15 L simple mask. Pt in NSR with PACs. Right groin soft, dressing dry. Right pedal and posterior tibial pulses palpable, foot warm. Will monitor.

## 2021-04-05 NOTE — H&P
N/V  · Hematology: negative for - bleeding, blood clots, bruising or jaundice  · Genito-Urinary:  negative for - Dysuria or incontinence  · Musculoskeletal: negative for - Joint swelling, muscle pain  · Neurological: negative for - confusion, dizziness, headaches   · Psychiatric: No anxiety, no depression. · Dermatological: negative for - rash    Physical Examination:  Vitals:    21 1311   BP: 118/73   Pulse: 66   SpO2: 96%        · Constitutional: Oriented. No distress. · Head: Normocephalic and atraumatic. · Mouth/Throat: Oropharynx is clear and moist.   · Eyes: Conjunctivae normal. EOM are normal.   · Neck: Neck supple. No rigidity. No JVD present. · Cardiovascular: Normal rate, regular rhythm, S1&S2. · Pulmonary/Chest: Bilateral respiratory sounds. No wheezes, No rhonchi. · Abdominal: Soft. Bowel sounds present. No distension, No tenderness. · Musculoskeletal: No tenderness. No edema    · Lymphadenopathy: Has no cervical adenopathy. · Neurological: Alert and oriented. Cranial nerve appears intact, No Gross deficit   · Skin: Skin is warm and dry. No rash noted. · Psychiatric: Has a normal behavior     Labs, diagnostic and imaging results reviewed. Lab Results   Component Value Date    TSHREFLEX 0.97 2020    CREATININE 0.9 2020    CREATININE 0.9 2020    AST 20 2020    ALT 11 2020       EC21  Sinus Rhythm with Multiform PVC's     Echo: 2020   Normal left ventricle size and systolic function with an estimated ejection   fraction of 55%.   There is borderline concentric left ventricular hypertrophy.   No definitive regional wall motion abnormalities are seen.   Indeterminate diastolic function 2/2 irregular rhythm.   The aortic valve appears sclerotic but opens well.   Mild tricuspid regurgitation.  PASP estimated at 40-45 mmHg.   Trivial mitral regurgitation.     GXT: 2020   Small sized inferior apical fixed defect of moderate intensity consistent  with infarction.        Reduced LV systolic function with calculated ejection fraction of 47%.        Overall findings represent a intermediate risk scan.      Cath: 9/23/2020  Anatomy:   LM-NML   LAD-mid 20%  Cx-nml  OM- nml  RCA-Dominant mid 20%  RPDA- nml  LVEF- 50%  LVG- nml  LVEDP- 10     Contrast: 75  Flouro Time: 2.4  Access: R radial     Impression  ~Coronary Angiography w/ Nonobstructive CAD  ~LVG with LVEF of 50 and no regional wall motion abnormalities     Event Monitor: 9/2020  31% afib burden with episodes of RVR    Medication:  Current Outpatient Medications   Medication Sig Dispense Refill    apixaban (ELIQUIS) 5 MG TABS tablet Take 1 tablet by mouth 2 times daily 180 tablet 2    doxazosin (CARDURA) 2 MG tablet Take 1 tablet by mouth daily 90 tablet 0    gabapentin (NEURONTIN) 300 MG capsule Take 1 capsule by mouth 2 times daily for 90 days. 180 capsule 0    citalopram (CELEXA) 40 MG tablet Take 1 tablet by mouth daily 90 tablet 0    tamsulosin (FLOMAX) 0.4 MG capsule Take 1 capsule by mouth daily 30 capsule 2    finasteride (PROSCAR) 5 MG tablet Take 1 tablet by mouth daily 30 tablet 2    atorvastatin (LIPITOR) 10 MG tablet Take 0.5 tablets by mouth daily 90 tablet 0    aspirin 81 MG EC tablet Take 1 tablet by mouth daily 30 tablet 0    metoprolol tartrate (LOPRESSOR) 25 MG tablet Take 0.5 tablets by mouth 2 times daily 60 tablet 3     No current facility-administered medications for this visit. Assessment and plan:        Paroxysmal Atrial Fibrillation   - ECG today shows Sinus rhythm with PVC's   - MCOT 9/23/2020 showed 31% afib burden with episodes of RVR   - Continue metoprolol 12.5 mg BID   - [id number] score: 2 (Age, HTN) ; [id number] Vasc score and anticoagulation discussed. High risk for stroke and thromboembolism. Anticoagulation is recommended. Risk of bleeding was discussed. On Coumadin; INRs monitored by [facility] anti-coagulation clinic    - Afib risk factors including age, HTN, obesity, inactivity and GORDON were discussed with patient. Risk factor modification recommended. TSH 0.97 (9/20)   -We discussed treatment options including antiarrhythmics, rate control with anticoagulation, and ablation.   -We discussed progressive nature of atrial fibrillation. Treatment success decreases when AF becomes persistent and last more than 6 months.    -Antiarrhythmic therapy, side effects, benefits and alternative discussed.     -Atrial fibrillation ablation procedure was discussed. We discussed the need for repeat procedure. On average patients may need more than one ablation procedure.     -Risks associated with ablation include but not limited to allergic reaction to the medications, pain, bleeding, infection, nerve injury, injury to diaphragm(breathing muscle), pulmonary embolus(blood clot in lungs), deep vein blood clot, pneumothorax, hemothorax, acute renal failure, cardiac perforation,  tamponade, need for emergent surgery (open heart), permanent pacemaker, pulmonary vein stenosis, left atrial to esophageal fistula, stroke, myocardial infarction and death. Difference between atrial fibrillation and atrial flutter discussed and treatment discussed.    -He is agreeable to proceeding with the atrial fibrillation ablation      When he is in A. fib his heart rates are very fast.He is  symptomatic with them. The problem is that he is bradycardic at other times and therefore increasing his beta-blocker dose or adding any other medication or antiarrhythmic drugs is not optional.  Therefore at this point we either can proceed with a pacemaker and then treat him medically or proceed with ablation of atrial fibrillation. He preferred to proceed with ablation at this point and then we will monitor his heart rate afterwards.     Bradycardia   -Makes control of Afib RVR difficult    -Discussed treatment options including doing an ablation of atrial fibrillation vs placing a pacemaker and continuing with

## 2021-04-05 NOTE — PROCEDURES
[address]     Electrophysiology Procedure Note       Date of Procedure: 4/5/2021  Patient's Name: Christina Ayala  YOB: 1945   Medical Record Number: [id number]  Referring Physician: Christofer Sinclair MD  Procedure Performed by: Vani Miller MD     ·           Transesophageal echocardiography  ·           Electrophysiology study witht left atrial recording and mapping   . Left atrial recording and mapping via CS  . electrophysiological study with attempt at induction after IV drug infusion  ·           3-D electroanatomical mapping of the left atrium. ·           Transseptal puncture through an intact septum. ·           Intracardiac echocardiography. ·            LV pacing and sensing  ·           Cryo-ablation of atrial fibrillation with pulmonary vein isolation   . Indications for procedure:  Symptomatic paroxysmal atrial fibrillation   Patient is 78y/o male with frequent paroxysmal atrial fibrillation and symptomatic who has not responded to beta-blocker  and  antiarrhythmic drugs  and /or has side efects of antiarrhythmic drugs     Details of procedure: The risks, benefits and alternatives of the ablation procedure were discussed with the patient. The risks including, but not limited to, the risks of bleeding, infection, radiation exposure, injury to vascular, cardiac and surrounding structures (including pneumothorax), esophageal injury, stroke, cardiac perforation, tamponade, need for emergent open heart surgery, need for pacemaker implantation, myocardial infarction and death were discussed in detail. The patient opted to proceed with the ablation. Written informed consent was signed and placed in the chart. Patient was brought to the EP lab in a fasting non-sedate state. Patient underwent general anesthesia by anesthesia team. We initially tried to perform a transesophageal echo but were unsuccessful in passing the probe.  Therefore since he was in sinus rhythm, we proceeded with first doing ICE to assess LA appendage before proceeding to rest of procedure as described below. . Both groins were prepped in a sterile fashion. We gained access in right femoral veins. A 9-Czech sheath for ICE and 5F sheath for CS catheter was placed in the right femoral vein using modified seldinger technique. Ultrasound was used for femoral venous access. We gained access   modified Seldinger technique and ultrasound guidance. The femoral vein was identified with real time visualization of needle passage to the venous lumen. Then a CS cathter was placed inside the coronary sinus under fluoroscopy for recording  and mapping of the left atrium. Intracardiac echocardiography (ICE) catheter was advanced under fluoroscopy to the in the right atrium . Using ICE we delineated the left pulmonary vein, left atrial appendage,  mitral valve, and right superior and right inferior pulmonary veins using 3-D mapping system Carto . Patient received a bolus of heparin prior transseptal puncture followed by continuous monitoring of the ACT and boluses of heparin during the procedure to keep the ACT more than 300. Transseptal punctures through intact septum were done using ICE a. We placed SR0 sheath inside the left atrium. The sheath was exchanged over the wire with the FlexCath sheath,ASSESSMENT: 3-D map of LA was created using Penta array catheter with voltage map of LA. Then we advanced the \"Ar[place] Front Cryo-Balloon\" and \"Achieve\" inside the sheath and placed it inside the left atrium. A 3D  mapping of the left atrium including ostium of all pulmonary vein, using Carto mapping system was created. A voltage map before and after ablation was done     During cryoablation, angiography of the left and right pulmonary veins was performed to deliniate the pulmonary veins ostium. Using ICE we also confirmed the position of lasso catheter at the ostium of pulmonary veins.   During ablation careful monitoring of esophageal temperature was done to prevent injury to esophagus. Then we proceeded with isolation of the left sided pulmonary veins with cryoablation. The achieve catheter was advanced inside the left superior pulmonary vein. After the inflation of the balloon, the location of it was confirmed using ICE and contrast injection to confirm complete occlusion and close contact between the balloon and pulmonary vein ostium. We placed  1cryoablation lesions on the LSPV ostium . Then cryoablation 1 lesions was placed on the LIPV ostium(180 sec, . For isolation of right sided pulmonary veins, CS catheter was placed in the SVC for high output pacing of the right phrenic nerve. During cryoablation of the right sided pulmonary veins, phrenic nerve was continuously monitored by pacing it and checking diaphragmatic contractions. 5  Cryoablation lesions on the RIPV  was placed, due to difficulty occluding it. Pull down was also done      1 Cryoablation lesions were placed on the RSPV, 180 sec. All lesions 180 seconds        Following ablation complete isolation of all four pulmonary veins were confirmed using circular cathter. Both entrance and exit block was confirmed. For left sided pulmonary vein we used pacing from distal CS catheter to check isolation of the vein and entrance block. Pacing through the circular catheter poles inside the vein using high current, failed to conduct to the atrium confirming exit block on all four pulmonary veins. Following confirmation of pulmonary veins isolation by circular catheter, isuprel IV was started and increased to 10 mcg to check for pulmonary veins reconnection and induction of any arrhythmia. No arrhythmia was induced.  Programmed stim and burst pacing did not induce any arrhythmia other than a few atrial beats     CArto Penta array voltage map also confirmed isolation     After ablation we removed the catheters and sheaths from

## 2021-04-05 NOTE — PROGRESS NOTES
Pt meets d/c criteria for phase 1 in PACU and has been seen by anesthesia. Ok to transfer back to cath lab for phase 2 care. Will alert anyone in waiting room for them and the nursing unit if applicable. Will continue to monitor for safety and comfort.     Eric YIPN, RN, [facility]  Pre-Op/Recovery   Same Day Surgery

## 2021-04-05 NOTE — ANESTHESIA PRE PROCEDURE
Department of Anesthesiology  Preprocedure Note       Name:  Kwan Mattson   Age:  77 y.o.  :  1945                                          MRN:  [id number]         Date:  2021      Surgeon: * Surgery not found *    Procedure:     Medications prior to admission:   Prior to Admission medications    Medication Sig Start Date End Date Taking? Authorizing Provider   apixaban (ELIQUIS) 5 MG TABS tablet Take 1 tablet by mouth 2 times daily 21  Grace Lerma MD   doxazosin (CARDURA) 2 MG tablet Take 1 tablet by mouth daily 21  Grace Lerma MD   gabapentin (NEURONTIN) 300 MG capsule Take 1 capsule by mouth 2 times daily for 90 days. 21  Grace Lerma MD   citalopram (CELEXA) 40 MG tablet Take 1 tablet by mouth daily 21  Grace Lerma MD   tamsulosin [facility]) 0.4 MG capsule Take 1 capsule by mouth daily 21  Grace Lerma MD   finasteride (PROSCAR) 5 MG tablet Take 1 tablet by mouth daily 21  Grace Lerma MD   atorvastatin (LIPITOR) 10 MG tablet Take 0.5 tablets by mouth daily 21  Grace Lerma MD   aspirin 81 MG EC tablet Take 1 tablet by mouth daily 21   Grace Lerma MD   metoprolol tartrate (LOPRESSOR) 25 MG tablet Take 0.5 tablets by mouth 2 times daily 20   Grace Lerma MD       Current medications:    No current facility-administered medications for this encounter. Allergies:     Allergies   Allergen Reactions    Shellfish Allergy     Augmentin [Amoxicillin-Pot Clavulanate] Nausea And Vomiting       Problem List:    Patient Active Problem List   Diagnosis Code    Essential hypertension I10    Spinal stenosis, lumbar region with neurogenic claudication M48.062    Pure hypercholesterolemia E78.00    Peripheral neuropathy G62.9    Body mass index 28.0-28.9, adult D87.43    DDD (degenerative disc disease), thoracolumbar M51.35    Tobacco abuse Z72.0    Chronic idiopathic constipation K59.04    Benign prostatic hyperplasia with urinary frequency N40.1, R35.0    Overactive bladder N32.81    Reactive depression F32.9    Major depressive disorder with single episode, 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    Paroxysmal atrial fibrillation (HCC) I48.0    Coronary artery disease due to lipid rich plaque I25.10, I25.83    At risk for sleep apnea Z91.89    Sciatica M54.30    GORDON (obstructive sleep apnea) G47.33    Obstructive sleep apnea syndrome G47.33       Past Medical History:        Diagnosis Date    Hypercholesteremia     Hypertension        Past Surgical History:        Procedure Laterality Date    HEMORRHOID SURGERY      UROLIFT    SHOULDER SURGERY Right     RTC        Social History:    Social History     Tobacco Use    Smoking status: Current Every Day Smoker     Packs/day: 0.50     Years: 50.00     Pack years: 25.00     Types: Cigarettes    Smokeless tobacco: Former User     Quit date: 1/1/2011   Substance Use Topics    Alcohol use: No                                Ready to quit: Not Answered  Counseling given: Not Answered      Vital Signs (Current): There were no vitals filed for this visit.                                            BP Readings from Last 3 Encounters:   02/27/21 (!) 140/59   02/22/21 110/78   02/17/21 118/73       NPO Status:                                                                                 BMI:   Wt Readings from Last 3 Encounters:   02/27/21 188 lb 4 oz (85.4 kg)   02/22/21 188 lb (85.3 kg)   02/17/21 188 lb 12.8 oz (85.6 kg)     There is no height or weight on file to calculate BMI.    CBC:   Lab Results   Component Value Date    WBC 11.2 02/27/2021    RBC 4.89 02/27/2021    HGB 14.8 02/27/2021    HCT 44.5 02/27/2021    MCV 91.0 02/27/2021    RDW 13.8 02/27/2021     02/27/2021       CMP:   Lab Results   Component Value Date     02/27/2021    K 4.4 02/27/2021    K 4.5 09/21/2020    CL 99 02/27/2021    CO2 23 02/27/2021    BUN 14 02/27/2021    CREATININE 0.9 02/27/2021    GFRAA >60 02/27/2021    AGRATIO 1.6 02/27/2021    LABGLOM >60 02/27/2021    GLUCOSE 113 02/27/2021    PROT 6.6 02/27/2021    CALCIUM 8.8 02/27/2021    BILITOT 0.9 02/27/2021    ALKPHOS 85 02/27/2021    AST 20 02/27/2021    ALT 19 02/27/2021       POC Tests: No results for input(s): POCGLU, POCNA, POCK, POCCL, POCBUN, POCHEMO, POCHCT in the last 72 hours.     Coags:   Lab Results   Component Value Date    PROTIME 12.2 02/27/2021    INR 1.05 02/27/2021    APTT 21.1 02/27/2021       HCG (If Applicable): No results found for: PREGTESTUR, PREGSERUM, HCG, HCGQUANT     ABGs: No results found for: PHART, PO2ART, GKG4NHX, SJD7YYD, BEART, C2MLZHSV     Type & Screen (If Applicable):  No results found for: LABABO, LABRH    Drug/Infectious Status (If Applicable):  No results found for: HIV, HEPCAB    COVID-19 Screening (If Applicable):   Lab Results   Component Value Date    COVID19 Not Detected 03/31/2021           Anesthesia Evaluation  Patient summary reviewed and Nursing notes reviewed no history of anesthetic complications:   Airway: Mallampati: II  TM distance: >3 FB   Neck ROM: full  Mouth opening: > = 3 FB Dental:    (+) upper dentures      Pulmonary:normal exam  breath sounds clear to auscultation  (+) sleep apnea: on CPAP,  current smoker    (-) COPD and asthma                           Cardiovascular:    (+) hypertension:, past MI (stress 9/20 Small sized infapic fixed defect of mod intensity c/w infarct, EF 47):, CAD (cath 9/20 nonobst dz, Ef 50, no RWMA):, dysrhythmias (PAF, on bb ad ac as outpt): atrial fibrillation, hyperlipidemia    (-) CABG/stent,  angina and  CHF (echo 9/20 Ef 56 no RWMA PASP 40-45)    ECG reviewed  Rhythm: irregular  Rate: normal  Echocardiogram reviewed  Stress test reviewed       Beta Blocker:  Dose within 24 Hrs         Neuro/Psych:   (+) neuromuscular disease (sciatica, peripheral neuropathy, lumbar ss):, depression/anxiety

## 2021-04-05 NOTE — ANESTHESIA POSTPROCEDURE EVALUATION
Department of Anesthesiology  Postprocedure Note    Patient: Gregorio Hagen  MRN: [id number]  YOB: 1945  Date of evaluation: 4/5/2021  Time:  10:57 AM     Procedure Summary     Date: 04/05/21 Room / Location: [facility] Cath Lab; [facility] Echocardiography    Anesthesia Start: 7271 Anesthesia Stop:     Procedure: ECHO RICH IN CARDIAC CATH Diagnosis:     Scheduled Providers:  Responsible Provider: Meaghan Larsen MD    Anesthesia Type: general ASA Status: 3          Anesthesia Type: No value filed. Maria T Phase I: Maria T Score: 8    Maria T Phase II:      Last vitals: Reviewed and per EMR flowsheets.        Anesthesia Post Evaluation    Patient location during evaluation: PACU  Patient participation: complete - patient participated  Level of consciousness: awake and alert  Airway patency: patent  Nausea & Vomiting: no vomiting and no nausea  Complications: no  Cardiovascular status: hemodynamically stable  Respiratory status: acceptable  Hydration status: stable

## 2021-04-05 NOTE — DISCHARGE SUMMARY
EP Discharge Summary  Sohail    Patient :Marya Haas  Room/Bed: 7LT-2996/0719-11  Medical Record: [id number]  YOB: 1945    Admit date: 4/5/2021  Discharge date: 04/06/21     Admitting Physician: Rachna Panda MD   Discharge NP: Tr Wei CNP    Admission Diagnoses: PAF (paroxysmal atrial fibrillation) [facility]) [I48.0]  Discharge Diagnoses: PAF    Discharged Condition: good    Hospital Course: The patient is a 77 y.o. male with a past medical history of HTN, PAF, GORDON, non obstructive CAD, and HLD.     In September of 2020, pt presented to hospital with palpitations and tachycardia at his PCP office. Found to be in atrial fibrillation on EKG and was sent to the ER. He was recently taking off his BB due to asymptomatic bradycardia during a urology procedure. Interval HX: Patient presented for elective cardiac ablation. He had oozing and hematoma yesterday requiring manual pressure. No further issues overnight. Right groin ablation site soft, no hematoma/ooozing/swelling noted. Up in chair, ambulating in room without issue. He does report some mild chest/abd discomfort with coughing and deep breaths. Patient seen and examined. Notes, labs, and recent testing reviewed. Telemetry reviewed, pt in NSR  No new complaints today and no major events overnight. Denies having chest pain, palpitations, shortness of breath, edema or dizziness at the time of this visit.     Consults: none    Objective:   /76   Pulse 70   Temp 98.2 °F (36.8 °C) (Temporal)   Resp 16   Ht 5' 9\" (1.753 m)   Wt 189 lb 12.8 oz (86.1 kg)   SpO2 93%   BMI 28.03 kg/m²      Intake/Output Summary (Last 24 hours) at 4/6/2021 1042  Last data filed at 4/6/2021 0410  Gross per 24 hour   Intake 490 ml   Output 600 ml   Net -110 ml       Physical Exam:  Telemetry: NSR with PACs  General: Alert & Oriented x4 in no distress  Skin: Warm and dry, no cyanosis or bruising  Neck: JVD <8, no bruit  Respiratory: Respirations symmetric and unlabored. Lungs clear to auscultation bilaterally, no wheezing, rhonchi, or crackles  Cardiovascular: Regular rate and rhythm. S1/S2 present without murmurs, rubs, or gallops. Abdomen: Soft, nontender, +bowel sounds  Extremities: No edema. Right groin ablation site soft, no hematoma/swelling/oozing noted. + Ecchymosis    Significant Diagnostic Studies:   RICH: 4/5/21  Pending    Labs  CBC:   Lab Results   Component Value Date    WBC 10.5 04/05/2021    HGB 14.1 04/05/2021    HCT 42.2 04/05/2021    MCV 90.0 04/05/2021     04/05/2021     BMP:   Lab Results   Component Value Date     04/05/2021    K 4.2 04/05/2021    K 4.5 09/21/2020     04/05/2021    CO2 25 04/05/2021    BUN 20 04/05/2021    CREATININE 1.1 04/05/2021    GFRAA >60 04/05/2021    MG 1.90 04/05/2021      Estimated Creatinine Clearance: 64 mL/min (based on SCr of 1.1 mg/dL).      Thyroid: No results found for: TSH, G2KOJZE, P0AWDDN, THYROIDAB  Lipids:  Lab Results   Component Value Date    CHOL 160 08/08/2018    HDL 38 08/08/2018    TRIG 224 08/08/2018     LFTS:   Lab Results   Component Value Date    ALT 13 04/05/2021    AST 12 04/05/2021    ALKPHOS 79 04/05/2021    PROT 6.3 04/05/2021    AGRATIO 1.7 04/05/2021    BILITOT 1.0 04/05/2021     Cardiac Enzymes:   Lab Results   Component Value Date    TROPONINI <0.01 09/22/2020    TROPONINI <0.01 09/21/2020     Coags:   Lab Results   Component Value Date    PROTIME 11.5 04/05/2021    INR 0.99 04/05/2021       Disposition: home    Home Medications:   Mahi Beatty Deysi. Medication Instructions [id number]    Printed on:04/06/21 1042   Medication Information                      apixaban (ELIQUIS) 5 MG TABS tablet  Take 1 tablet by mouth 2 times daily             aspirin 81 MG EC tablet  Take 1 tablet by mouth daily             atorvastatin (LIPITOR) 10 MG tablet  Take 0.5 tablets by mouth daily             citalopram (CELEXA) 40 MG tablet  Take 1 tablet by clinic      - Afib risk factors including age, HTN, obesity, inactivity and GORDON were discussed with patient. Risk factor modification recommended               ~ TSH 0.97 (9/20)                  - Treatment options including cardioversion, rate control strategy, antiarrhythmics, anticoagulation and possible ablation were discussed with patient. Risks, benefits and alternative of each treatment options were explained. All questions answered      2. Pleuritic Chest Discomfort   - Mild post ablation, occurs with deep breathes and cough   - Will give colchicine daily x2 weeks   - Reassurance provided to pt   - Instructed to call office if severe CP or SOB occur    3. HTN   - Controlled: Goal <130/80   - Continue current medications   - Discussed importance of low sodium diet, weight control and exercise     4. Hyperlipidemia              - Controlled. Goal: LDL <100                          ~ LDL 77 (2018)              - On atorvastatin 5 mg QD              - Discussed diet, weight loss, and exercise needs              - Followed per PCP     5. CAD              - Mild non obstructive CAD per cath (9/22/20)              - Stable              - No complaints of angina              - Continue BB and statin     6. GORDON  - Stable: Uses CPAP  - Encourage to use CPAP to prevent long term effects of untreated GORDON    7.  Tobacco Abuse              - Pt continues to smoke cigarettes daily              - Discussed benefits of cessation and risks of continued use              - Tobacco cessation counseling completed    Overall the patient is stable for discharge from a CV standpoint    Diet & Exercise:   The patient is counseled to follow a low salt diet to assure blood pressure remains controlled for cardiovascular risk factor modification   The patient is counseled to avoid excess caffeine, and energy drinks as this may exacerbated ectopy and arrhythmia   The patient is counseled to lose weight to control cardiovascular risk factors   Exercise program discussed: To improve overall cardiovascular health, the patient is instructed to increase cardiovascular related activities with a goal of 150 min/week of moderate level activity or 10,000 steps per day.  Encouraged to perform as much activity as tolerated    EF of 64%  ACEi for systolic HF: N/A  ASA and Statin for CAD: N/A  Anticoagulation for AF and heart failure: Yes  Tobacco use was discussed with the patient and education provided: Yes  Documentation sent to PCP: Note sent to PCP office    Activity: See discharge instructions  Diet: cardiac diet  Wound Care: See discharge instructions    F/U:   1. Follow-up with EP NP in 6 weeks  -Call [place] [id number] at [phone] with any questions    Signed:  Sariah Gross, [address] [facility]  4/6/2021  10:42 AM    Total time spent on day of discharge including face-to-face visit, examination, documentation, counseling, preparation of discharge plans and follow-up, and discharge medicine reconciliation and prescriptions is >31 minutes

## 2021-04-06 VITALS
DIASTOLIC BLOOD PRESSURE: 76 MMHG | HEIGHT: 69 IN | RESPIRATION RATE: 16 BRPM | OXYGEN SATURATION: 93 % | SYSTOLIC BLOOD PRESSURE: 131 MMHG | BODY MASS INDEX: 28.11 KG/M2 | HEART RATE: 70 BPM | WEIGHT: 189.8 LBS | TEMPERATURE: 97.7 F

## 2021-04-06 LAB
GLUCOSE BLD-MCNC: 110 MG/DL (ref 70–99)
PERFORMED ON: ABNORMAL

## 2021-04-06 PROCEDURE — 99217 PR OBSERVATION CARE DISCHARGE MANAGEMENT: CPT | Performed by: NURSE PRACTITIONER

## 2021-04-06 PROCEDURE — 6370000000 HC RX 637 (ALT 250 FOR IP): Performed by: INTERNAL MEDICINE

## 2021-04-06 PROCEDURE — 2580000003 HC RX 258: Performed by: INTERNAL MEDICINE

## 2021-04-06 PROCEDURE — 6370000000 HC RX 637 (ALT 250 FOR IP): Performed by: NURSE PRACTITIONER

## 2021-04-06 RX ORDER — COLCHICINE 0.6 MG/1
0.6 TABLET ORAL DAILY
Qty: 14 TABLET | Refills: 0 | Status: SHIPPED | OUTPATIENT
Start: 2021-04-06 | End: 2021-06-09

## 2021-04-06 RX ADMIN — DOXAZOSIN 2 MG: 1 TABLET ORAL at 08:27

## 2021-04-06 RX ADMIN — APIXABAN 5 MG: 5 TABLET, FILM COATED ORAL at 08:27

## 2021-04-06 RX ADMIN — Medication 10 ML: at 08:34

## 2021-04-06 RX ADMIN — TAMSULOSIN HYDROCHLORIDE 0.4 MG: 0.4 CAPSULE ORAL at 08:27

## 2021-04-06 RX ADMIN — GABAPENTIN 300 MG: 300 CAPSULE ORAL at 08:27

## 2021-04-06 RX ADMIN — CITALOPRAM HYDROBROMIDE 40 MG: 20 TABLET ORAL at 08:27

## 2021-04-06 RX ADMIN — METOPROLOL TARTRATE 12.5 MG: 25 TABLET, FILM COATED ORAL at 08:27

## 2021-04-06 RX ADMIN — Medication 81 MG: at 08:27

## 2021-04-06 ASSESSMENT — PAIN SCALES - GENERAL
PAINLEVEL_OUTOF10: 0
PAINLEVEL_OUTOF10: 0

## 2021-04-06 NOTE — PLAN OF CARE
Problem: Falls - Risk of:  Goal: Will remain free from falls  Description: Will remain free from falls  4/6/2021 1106 by Ketan Kimbrough RN  Outcome: Ongoing  Note: Medium fall risk. Patient steady on feet, no need for bed or chair alarm. Non-skid footwear on patient, belongings within reach. Problem: Skin Integrity:  Goal: Absence of new skin breakdown  Description: Absence of new skin breakdown  4/6/2021 1106 by Ketan Kimbrough RN  Outcome: Ongoing  Note: R groin site post ablation. Site clean, dry and intact. Bruise at site.

## 2021-04-06 NOTE — PROGRESS NOTES
Data- discharge order received, pt verbalized agreement to discharge, disposition to previous residence, no needs for HHC/DME. Action- discharge instructions prepared and given to patient, pt verbalized understanding. Medication information packet given r/t NEW and/or CHANGED prescriptions emphasizing name/purpose/side effects, pt verbalized understanding. Discharge instruction summary: Diet- cardiac, Activity- non-strenuous activity, Primary Care Physician as follows: Gale Dias -[phone] f/u appointment within 1 week, immunizations reviewed and udpated, prescription medications filled by patient. Inpatient surgical procedure precautions reviewed: EP study with cardiac ablation. Response- Pt belongings gathered, IV removed. Disposition is home (no HHC/DME needs), transported with friend, taken to lobby via w/c w/ discharge lounge, no complications.

## 2021-04-06 NOTE — PROGRESS NOTES
Shift assessment completed. Pt is a/o X 4. VSS. r femoral site has old drainage and ecchymossis but is soft, no hematoma and non tender. Dressing in place. POC discussed and all questions answered. Pt provided with fresh ice water. Pt has belongings and call light in reach. Bed is locked and in lowest position, bed alarm is refused by pt and pt is medium fall risk and has a steady gait. Pt denies further needs, will continue to monitor.

## 2021-04-06 NOTE — PLAN OF CARE
Problem: Falls - Risk of:  Goal: Will remain free from falls  Description: Will remain free from falls  Outcome: Met This Shift  Note: Pt is wearing the fall bracelet and yellow nonskid socks. Bed is in lowest position, locked, side rails up 2/4, and call light is within reach. Pt informed of fall risks, verbalizes understanding. Will monitor. Goal: Absence of physical injury  Description: Absence of physical injury  Outcome: Met This Shift     Problem: Skin Integrity:  Goal: Absence of new skin breakdown  Description: Absence of new skin breakdown  Outcome: Met This Shift  Note: No breakdown noted on this shift. Pt repositions self in bed frequently. Pt continent and calls appropriately. Will monitor.

## 2021-04-16 RX ORDER — COLCHICINE 0.6 MG/1
TABLET ORAL
Qty: 14 TABLET | Refills: 0 | OUTPATIENT
Start: 2021-04-16

## 2021-05-03 RX ORDER — PANTOPRAZOLE SODIUM 40 MG/1
TABLET, DELAYED RELEASE ORAL
Qty: 30 TABLET | Refills: 0 | OUTPATIENT
Start: 2021-05-03

## 2021-05-03 NOTE — TELEPHONE ENCOUNTER
Received refill request for pantoprazole from [facility] pharmacy.     Last ov: 2/17/2021 MXA    Last Refill:4/5/2021 #30 with 0 refills     Next appointment: 6//2021 NPSR

## 2021-05-12 PROBLEM — Z91.89 AT RISK FOR SLEEP APNEA: Status: RESOLVED | Noted: 2020-10-05 | Resolved: 2021-05-12

## 2021-05-12 NOTE — PROGRESS NOTES
[address]   Electrophysiology  TIMOTHY Andrew-CNP  Attending EP: Dr. Markie Del Valle    Date: 6/9/2021  I had the privilege of visiting Sommer Gallo in the office. Chief Complaint:   Chief Complaint   Patient presents with    Atrial Fibrillation     No complaints     Other     post ablation      History of Present Illness: History obtained from patient and medical record. Sommer Gallo is 77 y.o. male with a past medical history of HTN and HLD.     In September of 2020, pt presented to hospital with palpitations and tachycardia at his PCP office. Found to be in atrial fibrillation on EKG and was sent to the ER. He was recently taking off his BB due to asymptomatic bradycardia during a urology procedure. S/p PVI with cryo-ablation (4/5/21)     -Interval history: Today, Sommer Gallo is being seen for follow up. He is doing well. He remains in sinus rhythm since his ablation in April. Pt denies any recurrence since his ablation. His heart racing has ceased. His BP is stable, 134/82. He reports slightly lower, but stable readings at home. He recently had a colonoscopy and was found to have 4 polyps, but the biopsies came back benign. He states he felt \"better overall\" during the couple days off eliquis. He wants to know if he can stop anti-coagulation. Pt reports he has felt more energy since his ablation and starting on CPAP therapy. He continues to smoke 1 PPD. He states he smokes to help his stress. Denies having chest pain, palpitations, shortness of breath, orthopnea/PND, cough, or dizziness at the time of this visit. With regard to medication therapy the patient has been compliant with prescribed regimen. They have tolerated therapy to date. Allergies: Allergies   Allergen Reactions    Shellfish Allergy     Augmentin [Amoxicillin-Pot Clavulanate] Nausea And Vomiting     Home Medications:  Prior to Visit Medications    Medication Sig Taking?  Authorizing Provider   apixaban (ELIQUIS) 5 MG TABS tablet Take by mouth 2 times daily Yes Theodore Corona MD   doxazosin (CARDURA) 2 MG tablet Take 1 tablet by mouth daily Yes Makayla Bai MD   gabapentin (NEURONTIN) 300 MG capsule Take 1 capsule by mouth 2 times daily for 90 days. Yes Makayla Bai MD   citalopram (CELEXA) 40 MG tablet Take 1 tablet by mouth daily Yes Makayla Bai MD   tamsulosin (FLOMAX) 0.4 MG capsule Take 1 capsule by mouth daily Yes Makayla Bai MD   finasteride (PROSCAR) 5 MG tablet Take 1 tablet by mouth daily Yes Makayla Bai MD   atorvastatin (LIPITOR) 10 MG tablet Take 0.5 tablets by mouth daily Yes Makayla Bai MD   aspirin 81 MG EC tablet Take 1 tablet by mouth daily Yes Makayla Bai MD   colchicine (COLCRYS) 0.6 MG tablet Take 1 tablet by mouth daily Yes TIMOTHY Cannon CNP   pantoprazole (PROTONIX) 40 MG tablet Take 1 tablet by mouth every morning (before breakfast) Yes Kaylee Handley MD   metoprolol tartrate (LOPRESSOR) 25 MG tablet Take 0.5 tablets by mouth 2 times daily Yes Makayla Bai MD      Past Medical History:  Past Medical History:   Diagnosis Date    [facility]     Hypercholesteremia     Hypertension      Past Surgical History:    has a past surgical history that includes Hemorrhoid surgery; shoulder surgery (Right); and Colonoscopy (05/24/2021). Social History:  Reviewed. reports that he has been smoking cigarettes. He has a 25.00 pack-year smoking history. He quit smokeless tobacco use about 10 years ago. He reports that he does not drink alcohol and does not use drugs. Family History:  Reviewed. family history includes Cancer in an other family member; High Blood Pressure in an other family member; Stroke in an other family member.      Review of System:  · Constitutional: Negative for fever, night sweats, chills, weight changes, or weakness  · Skin: Negative for rash, dry skin, pruritus, bruising, bleeding, blood clots, or changes in skin pigment  · HEENT: nml  RCA-Dominant mid 20%  RPDA- nml  LVEF- 50%  LVG- nml  LVEDP- 10      Impression  ~Coronary Angiography w/ Nonobstructive CAD  ~LVG with LVEF of 50 and no regional wall motion abnormalities    Event Monitor: 9/20  AF burden 31% with episodes of RVR    Assessment:    1. Paroxysmal Atrial Fibrillation  - S/p PVI with cryo-ablation (4/5/21)    - Currently in NSR  - Continue metoprolol 12.5 mg BID  - [id number] score: 2 (Age, HTN) ; [id number] Vasc score and anticoagulation discussed. High risk for stroke and thromboembolism. Anticoagulation is recommended. Risk of bleeding was discussed.  ~ On Eliquis 5 mg BID; will stop and switch to Xarelto 20 mg daily to see if he feels better    - Afib risk factors including age, HTN, obesity, inactivity and GORDON were discussed with patient. Risk factor modification recommended   ~ TSH 0.97 (9/20)       - Treatment options including cardioversion, rate control strategy, antiarrhythmics, anticoagulation and possible ablation were discussed with patient. Risks, benefits and alternative of each treatment options were explained. All questions answered     - Will consider holter at next visit to assess for recurrence    2. HTN  - Controlled: Goal <130/80  - Continue current medications  - Encouraged to monitor and log BP readings at home, then bring log to next visit  - Discussed importance of low sodium diet, weight control and exercise    3. Hyperlipidemia   - Controlled. Goal: LDL <100               ~ LDL 77 (2018)   - On atorvastatin 5 mg QD   - Discussed diet, weight loss, and exercise needs   - Followed per PCP     4. CAD   - Mild non-obstructive CAD per cath (9/22/20)   - Stable   - No complaints of angina   - Continue BB and statin     5. GORDON  - Stable: Uses CPAP  - Encourage to use CPAP to prevent long term effects of untreated GORDON    6.  Tobacco Abuse   - Pt continues to smoke cigarettes daily   - Discussed benefits of cessation and risks of continued use   - Tobacco cessation counseling completed    Plan:  1. Stop eliquis and start xarelto  2. Stop aspirin  3. Call if any issues  4. Work on reducing cigarette use  5. Exercise and activity as tolerated    F/U: Follow-up with NPSR in 4 months  -Call Cady Lindsey at [phone] with any questions    Diet & Exercise:   The patient is counseled to follow a low salt diet to assure blood pressure remains controlled for cardiovascular risk factor modification   The patient is counseled to avoid excess caffeine, and energy drinks as this may exacerbated ectopy and arrhythmia   The patient is counseled to lose weight to control cardiovascular risk factors   Exercise program discussed: To improve overall cardiovascular health, the patient is instructed to increase cardiovascular related activities with a goal of 150 min/week of moderate level activity or 10,000 steps per day. Encouraged to perform as much activity as tolerated     I have addressed the patient's cardiac risk factors and adjusted pharmacologic treatment as needed. In addition, I have reinforced the need for patient directed risk factor modification. I independently reviewed the ECG    All questions and concerns were addressed with the patient. Alternatives to treatment were discussed. Thank you for allowing to us to participate in the care of Hiram Mercedez     30-39 minutes spent preparing to see patient including reviewing patient history/prior tests/prior consults, performing a medical exam, counseling and educating patient/family/caregiver, ordering medications/tests/procedures, referring and communicating with PCPs and other pertinent consultants, documenting information in the EMR, independently interpreting results and communicating to family and coordination of patient care.      TIMOTHY Maldonado-CNP  [address]    Office: [phone]

## 2021-05-17 ENCOUNTER — TELEPHONE (OUTPATIENT)
Dept: CARDIOLOGY CLINIC | Age: 76
End: 2021-05-17

## 2021-05-19 ENCOUNTER — OFFICE VISIT (OUTPATIENT)
Dept: PULMONOLOGY | Age: 76
End: 2021-05-19
Payer: MEDICARE

## 2021-05-19 VITALS
SYSTOLIC BLOOD PRESSURE: 126 MMHG | DIASTOLIC BLOOD PRESSURE: 72 MMHG | HEART RATE: 69 BPM | OXYGEN SATURATION: 97 % | TEMPERATURE: 96.6 F

## 2021-05-19 DIAGNOSIS — I10 ESSENTIAL HYPERTENSION: ICD-10-CM

## 2021-05-19 DIAGNOSIS — F17.210 CIGARETTE NICOTINE DEPENDENCE WITHOUT COMPLICATION: ICD-10-CM

## 2021-05-19 DIAGNOSIS — G47.33 OBSTRUCTIVE SLEEP APNEA SYNDROME: Primary | ICD-10-CM

## 2021-05-19 PROCEDURE — 3017F COLORECTAL CA SCREEN DOC REV: CPT | Performed by: INTERNAL MEDICINE

## 2021-05-19 PROCEDURE — 4004F PT TOBACCO SCREEN RCVD TLK: CPT | Performed by: INTERNAL MEDICINE

## 2021-05-19 PROCEDURE — G8417 CALC BMI ABV UP PARAM F/U: HCPCS | Performed by: INTERNAL MEDICINE

## 2021-05-19 PROCEDURE — 4040F PNEUMOC VAC/ADMIN/RCVD: CPT | Performed by: INTERNAL MEDICINE

## 2021-05-19 PROCEDURE — 99407 BEHAV CHNG SMOKING > 10 MIN: CPT | Performed by: INTERNAL MEDICINE

## 2021-05-19 PROCEDURE — G8427 DOCREV CUR MEDS BY ELIG CLIN: HCPCS | Performed by: INTERNAL MEDICINE

## 2021-05-19 PROCEDURE — 1123F ACP DISCUSS/DSCN MKR DOCD: CPT | Performed by: INTERNAL MEDICINE

## 2021-05-19 PROCEDURE — 99214 OFFICE O/P EST MOD 30 MIN: CPT | Performed by: INTERNAL MEDICINE

## 2021-05-19 NOTE — PROGRESS NOTES
PULMONARY OFFICE NEW PATIENT VISIT    CONSULTING PHYSICIAN:  Romayne Part, MD     REASON FOR VISIT:   Chief Complaint   Patient presents with    Sleep Apnea       DATE OF VISIT: 5/19/2021    HISTORY OF PRESENT ILLNESS: 77y.o. year old male comes in with pulmonary/sleep clinic for a follow-up. Patient reports that he has been trying to use his BiPAP therapy regularly as he feels benefited from it. He is bothered by the mask leakage as it disturbs his sleep. Also feels his pressure setting is a little too high. Has been very stressed out due to family losses and health related issues. Has been smoking 1 pack/day has been trying to quit. Previously:  Patient reports that he has been having poor quality sleep for quite some time now. He has had several stressors in his life including his family member losses. He also had urological issues which required procedures. He continues to report snoring, gasping, choking at nighttime. He wakes up at least twice or thrice previously. During the Daytime He Feels Tired and Exhausted. Weight Has Been Stable. Denies Any A.M. Dry Mouth or A.M. Headaches. Sleep Medicine 1/8/2021 11/5/2020   Sitting and reading 1 0   Watching TV 1 0   Sitting, inactive in a public place (e.g. a theatre or a meeting) 1 0   As a passenger in a car for an hour without a break 0 0   Lying down to rest in the afternoon when circumstances permit 2 2   Sitting and talking to someone 0 0   Sitting quietly after a lunch without alcohol 1 1   In a car, while stopped for a few minutes in traffic 0 0   Total score 6 3   Neck circumference 16.5 -            REVIEW OF SYSTEMS:   CONSTITUTIONAL SYMPTOMS: The patient denies fever, fatigue, night sweats, weight loss or weight gain. HEENT: No vision changes. No tinnitus, Denies sinus pain. No hoarseness, or dysphagia. NECK: Patient denies swelling in the neck. CARDIOVASCULAR: Denies chest pain, palpitation, syncope.   RESPIRATORY: Denies shortness of breath or cough. GASTROINTESTINAL: Denies nausea, abdominal pain or change in bowel function. GENITOURINARY: Denies obstructive symptoms. No history of incontinence. BREASTS: No masses or lumps in the breasts. SKIN: No rashes or itching. MUSCULOSKELETAL: Denies weakness or bone pain. NEUROLOGICAL: No headaches or seizures. PSYCHIATRIC: Denies mood swings or depression. ENDOCRINE: Denies heat or cold intolerance or excessive thirst.  HEMATOLOGIC/LYMPHATIC: Denies easy bruising or lymph node swelling. ALLERGIC/IMMUNOLOGIC: No environmental allergies. PAST MEDICAL HISTORY:   Past Medical History:   Diagnosis Date    [facility]     Hypercholesteremia     Hypertension        PAST SURGICAL HISTORY:   Past Surgical History:   Procedure Laterality Date    HEMORRHOID SURGERY      UROLIFT    SHOULDER SURGERY Right     RTC        SOCIAL HISTORY:   Social History     Tobacco Use    Smoking status: Current Every Day Smoker     Packs/day: 0.50     Years: 50.00     Pack years: 25.00     Types: Cigarettes    Smokeless tobacco: Former User     Quit date: 1/1/2011   Vaping Use    Vaping Use: Never used   Substance Use Topics    Alcohol use: No    Drug use: No       FAMILY HISTORY:   Family History   Problem Relation Age of Onset    Cancer Other     High Blood Pressure Other     Stroke Other        MEDICATIONS:     Current Outpatient Medications on File Prior to Visit   Medication Sig Dispense Refill    apixaban (ELIQUIS) 5 MG TABS tablet Take by mouth 2 times daily      doxazosin (CARDURA) 2 MG tablet Take 1 tablet by mouth daily 90 tablet 0    gabapentin (NEURONTIN) 300 MG capsule Take 1 capsule by mouth 2 times daily for 90 days.  180 capsule 0    citalopram (CELEXA) 40 MG tablet Take 1 tablet by mouth daily 90 tablet 0    tamsulosin (FLOMAX) 0.4 MG capsule Take 1 capsule by mouth daily 90 capsule 0    finasteride (PROSCAR) 5 MG tablet Take 1 tablet by mouth daily 90 tablet 0  atorvastatin (LIPITOR) 10 MG tablet Take 0.5 tablets by mouth daily 90 tablet 0    aspirin 81 MG EC tablet Take 1 tablet by mouth daily 90 tablet 3    colchicine (COLCRYS) 0.6 MG tablet Take 1 tablet by mouth daily 14 tablet 0    pantoprazole (PROTONIX) 40 MG tablet Take 1 tablet by mouth every morning (before breakfast) 30 tablet 0    metoprolol tartrate (LOPRESSOR) 25 MG tablet Take 0.5 tablets by mouth 2 times daily 60 tablet 3     No current facility-administered medications on file prior to visit. ALLERGIES:   Allergies as of 05/19/2021 - Fully Reviewed 05/19/2021   Allergen Reaction Noted    Shellfish allergy  08/09/2011    Augmentin [amoxicillin-pot clavulanate] Nausea And Vomiting 01/21/2020      OBJECTIVE:   temporal temperature is 96.6 °F (35.9 °C). His blood pressure is 126/72 and his pulse is 69. His oxygen saturation is 97%. PHYSICAL EXAM:    CONSTITUTIONAL: He is a 77y.o.-year-old who appears his stated age. He is alert and oriented x 3 and in no acute distress. HEENT: PERRLA, EOMI. No scleral icterus. No thrush, atraumatic, normocephalic. Mallampati class I airway. NECK: Supple, without cervical or supraclavicular lymphadenopathy:  CARDIOVASCULAR: S1 S2 RRR. Without murmer  RESPIRATORY & CHEST: Lungs are clear to auscultation and percussion. No wheezing, no crackles. Good air movement  GASTROINTESTINAL & ABDOMEN: Soft, nontender, positive bowel sounds in all quadrants, non-distended, without hepatosplenomegaly. GENITOURINARY: Deferred. MUSCULOSKELETAL: No tenderness to palpation of the axial skeleton. There is no clubbing. No cyanosis. No edema of the lower extremities. SKIN OF BODY: No rash or jaundice. PSYCHIATRIC EVALUATION: Normal affect. Patient answers questions appropriately. HEMATOLOGIC/LYMPHATIC/ IMMUNOLOGIC: No palpable lymphadenopathy. NEUROLOGIC: Alert and oriented x 3. Groslly non-focal. Motor strength is 5+/5 in all muscle groups.  The patient has a normal sensorium globally. LABS:    Lab Summary Latest Ref Rng & Units 4/5/2021 2/27/2021   WBC 4.0 - 11.0 K/uL 10.5 11. 2(H)   Hgb 13.5 - 17.5 g/dL 14.1 14.8   Hct 40.5 - 52.5 % 42.2 44.5   Platelets 793 - 050 K/uL 190 184   Sodium 136 - 145 mmol/L 139 132(L)   Potassium 3.5 - 5.1 mmol/L 4.2 4.4   BUN 7 - 20 mg/dL 20 14   Creatinine 0.8 - 1.3 mg/dL 1.1 0.9   Glucose 70 - 99 mg/dL 109(H) 113(H)   Calcium 8.3 - 10.6 mg/dL 8.5 8.8   Magnesium 1.80 - 2.40 mg/dL 1.90 -   Alk Phos 40 - 129 U/L 79 85   Albumin 3.4 - 5.0 g/dL 4.0 4.1   Bilirubin 0.0 - 1.0 mg/dL 1.0 0.9   AST 15 - 37 U/L 12(L) 20   ALT 10 - 40 U/L 13 19   Some recent data might be hidden         IMAGING:    No new chest imaging for me to review. Pulmonary Functions Testing Results:    Baseline polysomnography done on 1/19/2021  Sleep efficiency: 79.3  Overall AHI: 31.5  REM AHI: 60  Lowest oxygen saturation: 77%  Time spent below an oxygen saturation 90%: 1.6 minutes      CPAP titration done on 2/22/2021  Type of therapy: Auto BiPAP with EPAP min: 13, pressure support range of 4-8 cmH2O with IPAP max of 25 cmH2O. With medium DreamWear full facemask.       CPAP compliance summary     3/5/2021-5/16/2021   Days with device usage  67/73 days   Average Usage  5 hours 38 minutes 10 seconds   Days with device usage >4hrs  79.5%   Large Leak per night  3 hours 46 minutes   AHI  8.1   CPAP settings  auto BiPAP   90th percentile pressure  IPAP: 17, EPAP: 13   Mask  full facemask     DME: [employer]      IMPRESSION AND RECOMMENDATIONS:     1. GORDON (obstructive sleep apnea)  -Patient has severe obstructive sleep apnea which is amenable to BiPAP therapy.  -Download shows reduction in AHI but severe leakage.  -I will send a prescription to check patient's mask fitting and change him to [facility] fullface mask.  -Also will change his pressure settings to auto titrating BiPAP with EPAP min: 10 cmH2O, pressure support range of 4-8 cmH2O and IPAP max of 20 cmH2O.  -Instructed patient to continue replacing his supplies regularly. 2. Essential hypertension  -Currently patient on Lopressor and doxazosin. 3. Body mass index 28.0-28.9, adult  -I strongly advised the patient to make efforts to lose weight. I discussed various modalities including moderate intensity intermittent exercises, diet control and bariatric surgery. If the patient loses even 10 to 15% of current body weight, it will be beneficial in improving the overall health. 4.  Nicotine dependence  -Patient counselled on the dangers of tobacco use and urged to quit. Also discussed the importance of a supportive environment and helped identify them. Discussed possibility of various Nicotine replacement therapies if experiencing prolonged craving or withdrawal symptoms. Discussed the possibility of negative mood or depression after quitting. Reassured that some weight gain after smoking is common and dietary, exercise, or lifestyle changes will be able to control it. Time spent counseling was >10mins. Return in about 3 months (around 8/19/2021) for jakob. Ilda Delgado MD  Pulmonary Critical Care and Sleep Medicine  5/19/2021, 9:11 AM    This note was completed using dragon medical speech recognition software. Grammatical errors, random word insertions, pronoun errors and incomplete sentences are occasional consequences of this technology due to software limitations. If there are questions or concerns about the content of this note of information contained within the body of this dictation they should be addressed with the provider for clarification.

## 2021-06-09 ENCOUNTER — OFFICE VISIT (OUTPATIENT)
Dept: CARDIOLOGY CLINIC | Age: 76
End: 2021-06-09
Payer: MEDICARE

## 2021-06-09 VITALS
HEIGHT: 69 IN | SYSTOLIC BLOOD PRESSURE: 134 MMHG | BODY MASS INDEX: 26.84 KG/M2 | OXYGEN SATURATION: 97 % | DIASTOLIC BLOOD PRESSURE: 82 MMHG | RESPIRATION RATE: 18 BRPM | HEART RATE: 72 BPM | WEIGHT: 181.2 LBS

## 2021-06-09 DIAGNOSIS — I10 ESSENTIAL HYPERTENSION: Chronic | ICD-10-CM

## 2021-06-09 DIAGNOSIS — I48.0 PAF (PAROXYSMAL ATRIAL FIBRILLATION) (HCC): Primary | ICD-10-CM

## 2021-06-09 DIAGNOSIS — I25.83 CORONARY ARTERY DISEASE DUE TO LIPID RICH PLAQUE: Chronic | ICD-10-CM

## 2021-06-09 DIAGNOSIS — I25.10 CORONARY ARTERY DISEASE DUE TO LIPID RICH PLAQUE: Chronic | ICD-10-CM

## 2021-06-09 DIAGNOSIS — G47.33 OSA (OBSTRUCTIVE SLEEP APNEA): ICD-10-CM

## 2021-06-09 DIAGNOSIS — Z72.0 TOBACCO ABUSE: ICD-10-CM

## 2021-06-09 PROCEDURE — 99214 OFFICE O/P EST MOD 30 MIN: CPT | Performed by: NURSE PRACTITIONER

## 2021-06-09 PROCEDURE — G8427 DOCREV CUR MEDS BY ELIG CLIN: HCPCS | Performed by: NURSE PRACTITIONER

## 2021-06-09 PROCEDURE — 93000 ELECTROCARDIOGRAM COMPLETE: CPT | Performed by: NURSE PRACTITIONER

## 2021-06-09 PROCEDURE — 4040F PNEUMOC VAC/ADMIN/RCVD: CPT | Performed by: NURSE PRACTITIONER

## 2021-06-09 PROCEDURE — 1123F ACP DISCUSS/DSCN MKR DOCD: CPT | Performed by: NURSE PRACTITIONER

## 2021-06-09 PROCEDURE — 4004F PT TOBACCO SCREEN RCVD TLK: CPT | Performed by: NURSE PRACTITIONER

## 2021-06-09 PROCEDURE — G8417 CALC BMI ABV UP PARAM F/U: HCPCS | Performed by: NURSE PRACTITIONER

## 2021-06-09 PROCEDURE — 3017F COLORECTAL CA SCREEN DOC REV: CPT | Performed by: NURSE PRACTITIONER

## 2021-06-09 NOTE — PATIENT INSTRUCTIONS
1. Stop eliquis and start xarelto  2. Stop aspirin  3. Call if any issues  4.  Work on reducing cigarette use

## 2021-08-18 ENCOUNTER — OFFICE VISIT (OUTPATIENT)
Dept: PULMONOLOGY | Age: 76
End: 2021-08-18
Payer: MEDICARE

## 2021-08-18 VITALS
OXYGEN SATURATION: 95 % | SYSTOLIC BLOOD PRESSURE: 128 MMHG | BODY MASS INDEX: 26.36 KG/M2 | WEIGHT: 178 LBS | HEART RATE: 84 BPM | HEIGHT: 69 IN | DIASTOLIC BLOOD PRESSURE: 72 MMHG

## 2021-08-18 DIAGNOSIS — I10 ESSENTIAL HYPERTENSION: Chronic | ICD-10-CM

## 2021-08-18 DIAGNOSIS — G47.33 OSA (OBSTRUCTIVE SLEEP APNEA): Primary | ICD-10-CM

## 2021-08-18 DIAGNOSIS — Z72.0 TOBACCO ABUSE: ICD-10-CM

## 2021-08-18 PROCEDURE — 4004F PT TOBACCO SCREEN RCVD TLK: CPT | Performed by: INTERNAL MEDICINE

## 2021-08-18 PROCEDURE — 4040F PNEUMOC VAC/ADMIN/RCVD: CPT | Performed by: INTERNAL MEDICINE

## 2021-08-18 PROCEDURE — G8417 CALC BMI ABV UP PARAM F/U: HCPCS | Performed by: INTERNAL MEDICINE

## 2021-08-18 PROCEDURE — 99214 OFFICE O/P EST MOD 30 MIN: CPT | Performed by: INTERNAL MEDICINE

## 2021-08-18 PROCEDURE — 1123F ACP DISCUSS/DSCN MKR DOCD: CPT | Performed by: INTERNAL MEDICINE

## 2021-08-18 PROCEDURE — 3017F COLORECTAL CA SCREEN DOC REV: CPT | Performed by: INTERNAL MEDICINE

## 2021-08-18 PROCEDURE — G8427 DOCREV CUR MEDS BY ELIG CLIN: HCPCS | Performed by: INTERNAL MEDICINE

## 2021-08-18 ASSESSMENT — SLEEP AND FATIGUE QUESTIONNAIRES
HOW LIKELY ARE YOU TO NOD OFF OR FALL ASLEEP IN A CAR, WHILE STOPPED FOR A FEW MINUTES IN TRAFFIC: 0
HOW LIKELY ARE YOU TO NOD OFF OR FALL ASLEEP WHILE WATCHING TV: 0
HOW LIKELY ARE YOU TO NOD OFF OR FALL ASLEEP WHILE SITTING AND READING: 0
HOW LIKELY ARE YOU TO NOD OFF OR FALL ASLEEP WHILE SITTING INACTIVE IN A PUBLIC PLACE: 0
HOW LIKELY ARE YOU TO NOD OFF OR FALL ASLEEP WHILE SITTING AND TALKING TO SOMEONE: 0
HOW LIKELY ARE YOU TO NOD OFF OR FALL ASLEEP WHILE LYING DOWN TO REST IN THE AFTERNOON WHEN CIRCUMSTANCES PERMIT: 2
HOW LIKELY ARE YOU TO NOD OFF OR FALL ASLEEP WHEN YOU ARE A PASSENGER IN A CAR FOR AN HOUR WITHOUT A BREAK: 0
ESS TOTAL SCORE: 2
HOW LIKELY ARE YOU TO NOD OFF OR FALL ASLEEP WHILE SITTING QUIETLY AFTER LUNCH WITHOUT ALCOHOL: 0

## 2021-08-18 NOTE — PROGRESS NOTES
PULMONARY OFFICE NEW PATIENT VISIT    CONSULTING PHYSICIAN:  Baljeet Perkins MD     REASON FOR VISIT:   Chief Complaint   Patient presents with    Sleep Apnea       DATE OF VISIT: 8/18/2021    HISTORY OF PRESENT ILLNESS: 77y.o. year old male comes in with pulmonary/sleep clinic for a follow-up. Patient reports that his sleep quality has improved and he is sleeping more. Wakes up refreshed. Denies any daytime sleepiness. Reports improved tolerance for the pressure setting on the BiPAP. Mask is still leaking but is not bothering him that much. Weight is stable. Continues to report both anxiety as well as depression due to various family issues. Previously: Patient reports that he has been trying to use his BiPAP therapy regularly as he feels benefited from it. He is bothered by the mask leakage as it disturbs his sleep. Also feels his pressure setting is a little too high. Has been very stressed out due to family losses and health related issues. Has been smoking 1 pack/day has been trying to quit. Patient reports that he has been having poor quality sleep for quite some time now. He has had several stressors in his life including his family member losses. He also had urological issues which required procedures. He continues to report snoring, gasping, choking at nighttime. He wakes up at least twice or thrice previously. During the Daytime He Feels Tired and Exhausted. Weight Has Been Stable. Denies Any A.M. Dry Mouth or A.M. Headaches.       Sleep Medicine 8/18/2021 1/8/2021 11/5/2020   Sitting and reading 0 1 0   Watching TV 0 1 0   Sitting, inactive in a public place (e.g. a theatre or a meeting) 0 1 0   As a passenger in a car for an hour without a break 0 0 0   Lying down to rest in the afternoon when circumstances permit 2 2 2   Sitting and talking to someone 0 0 0   Sitting quietly after a lunch without alcohol 0 1 1   In a car, while stopped for a few minutes in traffic 0 0 0 Total score 2 6 3   Neck circumference - 16.5 -            REVIEW OF SYSTEMS:   Negative except that mentioned in the HPI. PAST MEDICAL HISTORY:   Past Medical History:   Diagnosis Date    A-fib ([address]     Hypercholesteremia     Hypertension        PAST SURGICAL HISTORY:   Past Surgical History:   Procedure Laterality Date    COLONOSCOPY  05/24/2021    HEMORRHOID SURGERY      UROLIFT    SHOULDER SURGERY Right     RTC        SOCIAL HISTORY:   Social History     Tobacco Use    Smoking status: Current Every Day Smoker     Packs/day: 0.50     Years: 50.00     Pack years: 25.00     Types: Cigarettes    Smokeless tobacco: Former User     Quit date: 1/1/2011   Vaping Use    Vaping Use: Never used   Substance Use Topics    Alcohol use: No    Drug use: No       FAMILY HISTORY:   Family History   Problem Relation Age of Onset    Cancer Other     High Blood Pressure Other     Stroke Other        MEDICATIONS:     Current Outpatient Medications on File Prior to Visit   Medication Sig Dispense Refill    doxazosin (CARDURA) 2 MG tablet Take 1 tablet by mouth daily 90 tablet 0    citalopram (CELEXA) 40 MG tablet Take 1 tablet by mouth daily 90 tablet 0    atorvastatin (LIPITOR) 10 MG tablet Take 0.5 tablets by mouth daily 90 tablet 0    finasteride (PROSCAR) 5 MG tablet Take 1 tablet by mouth daily 90 tablet 0    tamsulosin (FLOMAX) 0.4 MG capsule Take 1 capsule by mouth daily 90 capsule 0    rivaroxaban (XARELTO) 20 MG TABS tablet Take 1 tablet by mouth daily (with breakfast) 90 tablet 1    metoprolol tartrate (LOPRESSOR) 25 MG tablet Take 0.5 tablets by mouth 2 times daily 60 tablet 3    gabapentin (NEURONTIN) 300 MG capsule Take 1 capsule by mouth 2 times daily for 90 days. 180 capsule 0     No current facility-administered medications on file prior to visit.                ALLERGIES:   Allergies as of 08/18/2021 - Fully Reviewed 08/18/2021   Allergen Reaction Noted    Shellfish allergy  08/09/2011    Augmentin [amoxicillin-pot clavulanate] Nausea And Vomiting 01/21/2020      OBJECTIVE:   height is 5' 9\" (1.753 m) and weight is 178 lb (80.7 kg). His blood pressure is 128/72 and his pulse is 84. His oxygen saturation is 95%. PHYSICAL EXAM:    CONSTITUTIONAL: He is a 77y.o.-year-old who appears his stated age. He is alert and oriented x 3 and in no acute distress. HEENT: PERRLA, EOMI. No scleral icterus. No thrush, atraumatic, normocephalic. Mallampati class I airway. NECK: Supple, without cervical or supraclavicular lymphadenopathy:  CARDIOVASCULAR: S1 S2 RRR. Without murmer  RESPIRATORY & CHEST: Lungs are clear to auscultation and percussion. No wheezing, no crackles. Good air movement  GASTROINTESTINAL & ABDOMEN: Soft, nontender, positive bowel sounds in all quadrants, non-distended, without hepatosplenomegaly. GENITOURINARY: Deferred. MUSCULOSKELETAL: No tenderness to palpation of the axial skeleton. There is no clubbing. No cyanosis. No edema of the lower extremities. SKIN OF BODY: No rash or jaundice. PSYCHIATRIC EVALUATION: Normal affect. Patient answers questions appropriately. HEMATOLOGIC/LYMPHATIC/ IMMUNOLOGIC: No palpable lymphadenopathy. NEUROLOGIC: Alert and oriented x 3. Groslly non-focal. Motor strength is 5+/5 in all muscle groups. The patient has a normal sensorium globally. LABS:    Lab Summary Latest Ref Rng & Units 4/5/2021 2/27/2021   WBC 4.0 - 11.0 K/uL 10.5 11. 2(H)   Hgb 13.5 - 17.5 g/dL 14.1 14.8   Hct 40.5 - 52.5 % 42.2 44.5   Platelets 164 - 893 K/uL 190 184   Sodium 136 - 145 mmol/L 139 132(L)   Potassium 3.5 - 5.1 mmol/L 4.2 4.4   BUN 7 - 20 mg/dL 20 14   Creatinine 0.8 - 1.3 mg/dL 1.1 0.9   Glucose 70 - 99 mg/dL 109(H) 113(H)   Calcium 8.3 - 10.6 mg/dL 8.5 8.8   Magnesium 1.80 - 2.40 mg/dL 1.90 -   Alk Phos 40 - 129 U/L 79 85   Albumin 3.4 - 5.0 g/dL 4.0 4.1   Bilirubin 0.0 - 1.0 mg/dL 1.0 0.9   AST 15 - 37 U/L 12(L) 20   ALT 10 - 40 U/L 13 19   Some smoking. .       Return in about 6 months (around 2/18/2022). Viktoria Hartmann MD  Pulmonary Critical Care and Sleep Medicine  8/18/2021, 8:58 AM    This note was completed using dragon medical speech recognition software. Grammatical errors, random word insertions, pronoun errors and incomplete sentences are occasional consequences of this technology due to software limitations. If there are questions or concerns about the content of this note of information contained within the body of this dictation they should be addressed with the provider for clarification.

## 2021-09-11 NOTE — PROGRESS NOTES
capsule by mouth daily Yes Mike Martin MD   doxazosin (CARDURA) 2 MG tablet Take 1 tablet by mouth daily Yes Mike Martin MD   citalopram (CELEXA) 40 MG tablet Take 1 tablet by mouth daily Yes Mike Martin MD   atorvastatin (LIPITOR) 10 MG tablet Take 0.5 tablets by mouth daily Yes Mike Martin MD   finasteride (PROSCAR) 5 MG tablet Take 1 tablet by mouth daily Yes Mike Martin MD   tamsulosin (FLOMAX) 0.4 MG capsule Take 1 capsule by mouth daily Yes Mike Martin MD   rivaroxaban (XARELTO) 20 MG TABS tablet Take 1 tablet by mouth daily (with breakfast) Yes Wannetta Litten, APRN - CNP   gabapentin (NEURONTIN) 300 MG capsule Take 1 capsule by mouth 2 times daily for 90 days. Yes Mike Martin MD   metoprolol tartrate (LOPRESSOR) 25 MG tablet Take 0.5 tablets by mouth 2 times daily Yes Mike Martin MD      Past Medical History:  Past Medical History:   Diagnosis Date    [facility]     Hypercholesteremia     Hypertension      Past Surgical History:    has a past surgical history that includes Hemorrhoid surgery; shoulder surgery (Right); and Colonoscopy (05/24/2021). Social History:  Reviewed. reports that he has been smoking cigarettes. He has a 25.00 pack-year smoking history. He quit smokeless tobacco use about 10 years ago. He reports that he does not drink alcohol and does not use drugs. Family History:  Reviewed. family history includes Cancer in an other family member; High Blood Pressure in an other family member; Stroke in an other family member.      Review of System:  · Constitutional: Negative for fever, night sweats, chills, weight changes, or weakness  · Skin: Negative for rash, dry skin, pruritus, bruising, bleeding, blood clots, or changes in skin pigment  · HEENT: Negative for vision changes, ringing in the ears, sore throat, dysphagia, or swollen lymph nodes  · Respiratory: Reviewed in HPI  · Cardiovascular: Reviewed in HPI  · Gastrointestinal: Negative for abdominal pain, N/V/D, constipation, or black/tarry stools  · Genito-Urinary: Negative for dysuria, incontinence, urgency, or hematuria  · Musculoskeletal: Negative for joint swelling, muscle pain, or injuries  · Neurological/Psych: Negative for confusion, seizures, dizziness, headaches, balance issues or TIA-like symptoms. No anxiety, depression, or insomnia    Physical Examination:  Vitals:    10/11/21 1451   BP: 130/70   Pulse: 73   SpO2: 97%      Wt Readings from Last 3 Encounters:   10/11/21 184 lb 6.4 oz (83.6 kg)   09/14/21 184 lb (83.5 kg)   08/19/21 180 lb (81.6 kg)     Constitutional: Cooperative and in no apparent distress, and appears well nourished  Skin: Warm and pink; no pallor, cyanosis, bruising, or clubbing  HEENT: Symmetric and normocephalic. PERRL, EOM intact. Conjunctiva pink with clear sclera. Mucus membranes pink and moist. Teeth intact. Thyroid smooth without nodules or goiter  Respiratory: Respirations symmetric and unlabored. Lungs clear to auscultation bilaterally, no wheezing, rhonchi, or crackles  Cardiovascular:  Regular rate and rhythm. S1/S2 present without murmurs, rubs, or gallops. Peripheral pulses 2+, capillary refill < 3 seconds. No elevation of JVP. No peripheral edema  Gastrointestinal: Abdomen soft and round. Bowel sounds normoactive in all quadrants without tenderness or masses. Musculoskeletal: Bilateral upper and lower extremity strength 5/5 with full ROM. Neurological/Psych: Awake and orientated to person, place and time. Calm affect, appropriate mood.      Pertinent labs, diagnostic, device, and imaging results reviewed as a part of this visit    LABS    CBC:   Lab Results   Component Value Date    WBC 10.5 04/05/2021    HGB 14.1 04/05/2021    HCT 42.2 04/05/2021    MCV 90.0 04/05/2021     04/05/2021     BMP:   Lab Results   Component Value Date    CREATININE 1.1 04/05/2021    BUN 20 04/05/2021     04/05/2021    K 4.2 04/05/2021     04/05/2021    CO2 25 04/05/2021     CrCl cannot be calculated (Patient's most recent lab result is older than the maximum 120 days allowed. ). Thyroid: No results found for: TSH, L2DACHC, X9JEJTM, THYROIDAB  Lipid Panel:   Lab Results   Component Value Date    CHOL 160 08/08/2018    HDL 38 08/08/2018    TRIG 224 08/08/2018     LFTs:  Lab Results   Component Value Date    ALT 13 04/05/2021    AST 12 (L) 04/05/2021    ALKPHOS 79 04/05/2021    BILITOT 1.0 04/05/2021     Coags:   Lab Results   Component Value Date    PROTIME 11.5 04/05/2021    INR 0.99 04/05/2021    APTT 21.1 (L) 02/27/2021       ECG: 10/11/2021  - NSR, rate 72, QTc 407    RICH: 4/21   -Color flow and doppler were performed. Overall left ventricular function is normal. Mitral valve is structurally normal. Mild mitral regurgitation is present. There is no evidence of mass or thrombus in the left atrium or appendage. The left atrium is dilated. Aortic valve leaflets appear thickened. Tricuspid aortic valve. Trivial aortic regurgitation is present. Echo: 9/20   Normal left ventricle size and systolic function with an estimated ejection fraction of 55%. There is borderline concentric left ventricular hypertrophy. No definitive regional wall motion abnormalities are seen. Indeterminate diastolic function 2/2 irregular rhythm. The aortic valve appears sclerotic but opens well. Mild tricuspid regurgitation. PASP estimated at 40-45 mmHg. Trivial mitral regurgitation. GXT: 9/20   Small sized inferior apical fixed defect of moderate intensity consistent     with infarction.          Reduced LV systolic function with calculated ejection fraction of 47%.          Overall findings represent a intermediate risk scan.       Cath: 9/23/20  Anatomy:   LM-NML   LAD-mid 20%  Cx-nml  OM- nml  RCA-Dominant mid 20%  RPDA- nml  LVEF- 50%  LVG- nml  LVEDP- 10      Impression  ~Coronary Angiography w/ Nonobstructive CAD  ~LVG with LVEF of 50 and no regional wall motion abnormalities    Event Monitor: 9/20  AF burden 31% with episodes of RVR    Assessment:    1. Paroxysmal Atrial Fibrillation  - S/p PVI with cryo-ablation (4/5/21)    - Currently in NSR  - Continue metoprolol 12.5 mg BID  - [id number] score: 2 (Age, HTN); [id number] Vasc score and anticoagulation discussed. High risk for stroke and thromboembolism. Anticoagulation is recommended. Risk of bleeding was discussed.  ~ On Xarelto 20 mg daily; tolerates well    - Afib risk factors including age, HTN, obesity, inactivity and GORDON were discussed with patient. Risk factor modification recommended   ~ TSH 0.97 (9/20)       - Treatment options including cardioversion, rate control strategy, antiarrhythmics, anticoagulation and possible ablation were discussed with patient. Risks, benefits and alternative of each treatment options were explained. All questions answered     - Will do 2 week holter prior to next visit to assess for recurrence    2. HTN  - Controlled: Goal <130/80  - Continue current medications  - Encouraged to monitor and log BP readings at home, then bring log to next visit  - Discussed importance of low sodium diet, weight control and exercise    3. Hyperlipidemia   - Controlled. Goal: LDL <100               ~ LDL 77 (2018); need rechecked   - On atorvastatin 5 mg QD   - Discussed diet, weight loss, and exercise needs   - Followed per PCP     4. CAD   - Mild non-obstructive CAD per cath (9/22/20)   - Stable   - No complaints of angina   - Continue BB and statin    ~ No ASA due to anti-coagulation     5. GORDON  - Stable: Uses Bi-PAP  - Encourage to use Bi-PAP to prevent long term effects of untreated GORDON    6. Tobacco Abuse   - Pt continues to smoke cigarettes daily   - Discussed benefits of cessation and risks of continued use   - Tobacco cessation counseling completed    Plan:  1. No changes  2. Call 6 weeks prior to next visit and we will do 2 week monitor  3.  Work on reducing cigarette use    F/U: Follow-up with EP in 6 months  -Call [facility]  at [phone] with any questions    Diet & Exercise:   The patient is counseled to follow a low salt diet to assure blood pressure remains controlled for cardiovascular risk factor modification   The patient is counseled to avoid excess caffeine, and energy drinks as this may exacerbated ectopy and arrhythmia   The patient is counseled to lose weight to control cardiovascular risk factors   Exercise program discussed: To improve overall cardiovascular health, the patient is instructed to increase cardiovascular related activities with a goal of 150 min/week of moderate level activity or 10,000 steps per day. Encouraged to perform as much activity as tolerated     I have addressed the patient's cardiac risk factors and adjusted pharmacologic treatment as needed. In addition, I have reinforced the need for patient directed risk factor modification. I independently reviewed the ECG    All questions and concerns were addressed with the patient. Alternatives to treatment were discussed. Thank you for allowing to us to participate in the care of [facility]     32 minutes spent preparing to see patient including reviewing patient history/prior tests/prior consults, performing a medical exam, counseling and educating patient/family/caregiver, ordering medications/tests/procedures, referring and communicating with PCPs and other pertinent consultants, documenting information in the EMR, independently interpreting results and communicating to family and coordination of patient care.      Mckayla Ly, TIMOTHY-CNP  [facility]    Office: [phone]

## 2021-10-11 ENCOUNTER — OFFICE VISIT (OUTPATIENT)
Dept: CARDIOLOGY CLINIC | Age: 76
End: 2021-10-11
Payer: MEDICARE

## 2021-10-11 VITALS
HEIGHT: 69 IN | SYSTOLIC BLOOD PRESSURE: 130 MMHG | HEART RATE: 73 BPM | DIASTOLIC BLOOD PRESSURE: 70 MMHG | WEIGHT: 184.4 LBS | OXYGEN SATURATION: 97 % | BODY MASS INDEX: 27.31 KG/M2

## 2021-10-11 DIAGNOSIS — G47.33 OSA (OBSTRUCTIVE SLEEP APNEA): ICD-10-CM

## 2021-10-11 DIAGNOSIS — Z72.0 TOBACCO ABUSE: ICD-10-CM

## 2021-10-11 DIAGNOSIS — E78.00 PURE HYPERCHOLESTEROLEMIA: Chronic | ICD-10-CM

## 2021-10-11 DIAGNOSIS — I10 ESSENTIAL HYPERTENSION: Chronic | ICD-10-CM

## 2021-10-11 DIAGNOSIS — I25.10 CORONARY ARTERY DISEASE DUE TO LIPID RICH PLAQUE: Chronic | ICD-10-CM

## 2021-10-11 DIAGNOSIS — I25.83 CORONARY ARTERY DISEASE DUE TO LIPID RICH PLAQUE: Chronic | ICD-10-CM

## 2021-10-11 DIAGNOSIS — I48.0 PAF (PAROXYSMAL ATRIAL FIBRILLATION) (HCC): Primary | ICD-10-CM

## 2021-10-11 PROCEDURE — 4040F PNEUMOC VAC/ADMIN/RCVD: CPT | Performed by: NURSE PRACTITIONER

## 2021-10-11 PROCEDURE — G8427 DOCREV CUR MEDS BY ELIG CLIN: HCPCS | Performed by: NURSE PRACTITIONER

## 2021-10-11 PROCEDURE — 99214 OFFICE O/P EST MOD 30 MIN: CPT | Performed by: NURSE PRACTITIONER

## 2021-10-11 PROCEDURE — 3017F COLORECTAL CA SCREEN DOC REV: CPT | Performed by: NURSE PRACTITIONER

## 2021-10-11 PROCEDURE — 4004F PT TOBACCO SCREEN RCVD TLK: CPT | Performed by: NURSE PRACTITIONER

## 2021-10-11 PROCEDURE — G8482 FLU IMMUNIZE ORDER/ADMIN: HCPCS | Performed by: NURSE PRACTITIONER

## 2021-10-11 PROCEDURE — 1123F ACP DISCUSS/DSCN MKR DOCD: CPT | Performed by: NURSE PRACTITIONER

## 2021-10-11 PROCEDURE — 93000 ELECTROCARDIOGRAM COMPLETE: CPT | Performed by: NURSE PRACTITIONER

## 2021-10-11 PROCEDURE — G8417 CALC BMI ABV UP PARAM F/U: HCPCS | Performed by: NURSE PRACTITIONER

## 2021-10-11 NOTE — PATIENT INSTRUCTIONS
1. No changes  2. Call 6 weeks prior to next visit and we will do 2 week monitor  3.  Work on reducing cigarette use

## 2022-02-16 ENCOUNTER — OFFICE VISIT (OUTPATIENT)
Dept: PULMONOLOGY | Age: 77
End: 2022-02-16
Payer: MEDICARE

## 2022-02-16 VITALS
HEART RATE: 87 BPM | BODY MASS INDEX: 26.81 KG/M2 | WEIGHT: 181 LBS | SYSTOLIC BLOOD PRESSURE: 108 MMHG | OXYGEN SATURATION: 100 % | HEIGHT: 69 IN | DIASTOLIC BLOOD PRESSURE: 54 MMHG

## 2022-02-16 DIAGNOSIS — G47.33 OSA (OBSTRUCTIVE SLEEP APNEA): Primary | ICD-10-CM

## 2022-02-16 DIAGNOSIS — Z72.0 TOBACCO ABUSE: ICD-10-CM

## 2022-02-16 DIAGNOSIS — I10 ESSENTIAL HYPERTENSION: ICD-10-CM

## 2022-02-16 DIAGNOSIS — F17.210 CIGARETTE NICOTINE DEPENDENCE WITHOUT COMPLICATION: ICD-10-CM

## 2022-02-16 PROCEDURE — 99214 OFFICE O/P EST MOD 30 MIN: CPT | Performed by: INTERNAL MEDICINE

## 2022-02-16 RX ORDER — IPRATROPIUM BROMIDE 21 UG/1
2 SPRAY, METERED NASAL DAILY
Qty: 30 ML | Refills: 3 | Status: SHIPPED | OUTPATIENT
Start: 2022-02-16

## 2022-02-16 RX ORDER — FLUTICASONE PROPIONATE 50 MCG
1 SPRAY, SUSPENSION (ML) NASAL DAILY
Qty: 16 G | Refills: 3 | Status: SHIPPED | OUTPATIENT
Start: 2022-02-16

## 2022-02-16 ASSESSMENT — SLEEP AND FATIGUE QUESTIONNAIRES
HOW LIKELY ARE YOU TO NOD OFF OR FALL ASLEEP IN A CAR, WHILE STOPPED FOR A FEW MINUTES IN TRAFFIC: 0
HOW LIKELY ARE YOU TO NOD OFF OR FALL ASLEEP WHILE WATCHING TV: 0
HOW LIKELY ARE YOU TO NOD OFF OR FALL ASLEEP WHILE LYING DOWN TO REST IN THE AFTERNOON WHEN CIRCUMSTANCES PERMIT: 2
HOW LIKELY ARE YOU TO NOD OFF OR FALL ASLEEP WHILE SITTING QUIETLY AFTER LUNCH WITHOUT ALCOHOL: 0
HOW LIKELY ARE YOU TO NOD OFF OR FALL ASLEEP WHILE SITTING AND READING: 0
HOW LIKELY ARE YOU TO NOD OFF OR FALL ASLEEP WHILE SITTING INACTIVE IN A PUBLIC PLACE: 0
HOW LIKELY ARE YOU TO NOD OFF OR FALL ASLEEP WHILE SITTING AND TALKING TO SOMEONE: 0
ESS TOTAL SCORE: 2
HOW LIKELY ARE YOU TO NOD OFF OR FALL ASLEEP WHEN YOU ARE A PASSENGER IN A CAR FOR AN HOUR WITHOUT A BREAK: 0

## 2022-02-16 NOTE — PROGRESS NOTES
PULMONARY OFFICE NEW PATIENT VISIT    CONSULTING PHYSICIAN:  Akiko Carson MD     REASON FOR VISIT:   Chief Complaint   Patient presents with    Sleep Apnea       DATE OF VISIT: 2/16/2022    HISTORY OF PRESENT ILLNESS: 77y.o. year old male comes in with pulmonary/sleep clinic for a follow-up. Patient reports that since September 2021 he has not been able to use the BiPAP therapy. He describes this noncompliance to various reasons including persistent sinus drainage, heightened anxiety as well as mask interface moving on his face causing mask leakage. Patient reports that in the winter months he has had increased postnasal drip as well as rhinorrhea. This leads to filling up of his mask interface with nasal secretions. He has been taking only over-the-counter antiallergy medications. Has not tried any nasal sprays. Weight has been stable. Patient's anxiety has increased along with depression. He has recently been started on Zoloft. Also reports that in the morning his mask interface moves on his face this leads to mask leakage. Previously: Patient reports that his sleep quality has improved and he is sleeping more. Wakes up refreshed. Denies any daytime sleepiness. Reports improved tolerance for the pressure setting on the BiPAP. Mask is still leaking but is not bothering him that much. Weight is stable. Continues to report both anxiety as well as depression due to various family issues. Patient reports that he has been trying to use his BiPAP therapy regularly as he feels benefited from it. He is bothered by the mask leakage as it disturbs his sleep. Also feels his pressure setting is a little too high. Has been very stressed out due to family losses and health related issues. Has been smoking 1 pack/day has been trying to quit. Patient reports that he has been having poor quality sleep for quite some time now.   He has had several stressors in his life including his family member losses. He also had urological issues which required procedures. He continues to report snoring, gasping, choking at nighttime. He wakes up at least twice or thrice previously. During the Daytime He Feels Tired and Exhausted. Weight Has Been Stable. Denies Any A.M. Dry Mouth or A.M. Headaches. Sleep Medicine 2/16/2022 8/18/2021 1/8/2021 11/5/2020   Sitting and reading 0 0 1 0   Watching TV 0 0 1 0   Sitting, inactive in a public place (e.g. a theatre or a meeting) 0 0 1 0   As a passenger in a car for an hour without a break 0 0 0 0   Lying down to rest in the afternoon when circumstances permit 2 2 2 2   Sitting and talking to someone 0 0 0 0   Sitting quietly after a lunch without alcohol 0 0 1 1   In a car, while stopped for a few minutes in traffic 0 0 0 0   Total score 2 2 6 3   Neck circumference (Inches) - - 16.5 -            REVIEW OF SYSTEMS:   Negative except that mentioned in the HPI.     PAST MEDICAL HISTORY:   Past Medical History:   Diagnosis Date    [facility] ([facility]     Hypercholesteremia     Hypertension        PAST SURGICAL HISTORY:   Past Surgical History:   Procedure Laterality Date    COLONOSCOPY  05/24/2021    HEMORRHOID SURGERY      UROLIFT    SHOULDER SURGERY Right     RTC        SOCIAL HISTORY:   Social History     Tobacco Use    Smoking status: Current Every Day Smoker     Packs/day: 0.50     Years: 50.00     Pack years: 25.00     Types: Cigarettes    Smokeless tobacco: Former User     Quit date: 1/1/2011   Vaping Use    Vaping Use: Never used   Substance Use Topics    Alcohol use: No    Drug use: No       FAMILY HISTORY:   Family History   Problem Relation Age of Onset    Cancer Other     High Blood Pressure Other     Stroke Other        MEDICATIONS:     Current Outpatient Medications on File Prior to Visit   Medication Sig Dispense Refill    sertraline (ZOLOFT) 50 MG tablet Take 1 tablet by mouth daily 30 tablet 0    doxazosin (CARDURA) 2 MG tablet Take 1 tablet by mouth daily 90 tablet 0    atorvastatin (LIPITOR) 10 MG tablet Take 0.5 tablets by mouth daily 90 tablet 0    finasteride (PROSCAR) 5 MG tablet Take 1 tablet by mouth daily 90 tablet 0    tamsulosin (FLOMAX) 0.4 MG capsule Take 1 capsule by mouth daily 90 capsule 0    gabapentin (NEURONTIN) 300 MG capsule Take 1 capsule by mouth 2 times daily for 90 days. 180 capsule 0    metoprolol tartrate (LOPRESSOR) 25 MG tablet Take 0.5 tablets by mouth 2 times daily 90 tablet 0    rivaroxaban (XARELTO) 20 MG TABS tablet Take 1 tablet by mouth daily (with breakfast) 90 tablet 1    LORazepam (ATIVAN) 0.5 MG tablet Take 1 tablet by mouth every 8 hours as needed for Anxiety for up to 30 days. 45 tablet 1    LORazepam (ATIVAN) 0.5 MG tablet Take 1 tablet by mouth 3 times daily as needed for Anxiety for up to 30 days. 45 tablet 1     No current facility-administered medications on file prior to visit. ALLERGIES:   Allergies as of 02/16/2022 - Fully Reviewed 02/16/2022   Allergen Reaction Noted    Shellfish allergy  08/09/2011    Augmentin [amoxicillin-pot clavulanate] Nausea And Vomiting 01/21/2020      OBJECTIVE:   height is 5' 9\" (1.753 m) and weight is 181 lb (82.1 kg). His blood pressure is 108/54 (abnormal) and his pulse is 87. His oxygen saturation is 100%. PHYSICAL EXAM:    CONSTITUTIONAL: He is a 77y.o.-year-old who appears his stated age. He is alert and oriented x 3 and in no acute distress. HEENT: PERRLA, EOMI. No scleral icterus. No thrush, atraumatic, normocephalic. Mallampati class I airway. NECK: Supple, without cervical or supraclavicular lymphadenopathy:  CARDIOVASCULAR: S1 S2 RRR. Without murmer  RESPIRATORY & CHEST: Lungs are clear to auscultation and percussion. No wheezing, no crackles. Good air movement  GASTROINTESTINAL & ABDOMEN: Soft, nontender, positive bowel sounds in all quadrants, non-distended, without hepatosplenomegaly. GENITOURINARY: Deferred. MUSCULOSKELETAL: No tenderness to palpation of the axial skeleton. There is no clubbing. No cyanosis. No edema of the lower extremities. SKIN OF BODY: No rash or jaundice. PSYCHIATRIC EVALUATION: Normal affect. Patient answers questions appropriately. HEMATOLOGIC/LYMPHATIC/ IMMUNOLOGIC: No palpable lymphadenopathy. NEUROLOGIC: Alert and oriented x 3. Groslly non-focal. Motor strength is 5+/5 in all muscle groups. The patient has a normal sensorium globally. LABS:    Lab Summary Latest Ref Rng & Units 4/5/2021 2/27/2021   WBC 4.0 - 11.0 K/uL 10.5 11. 2(H)   Hgb 13.5 - 17.5 g/dL 14.1 14.8   Hct 40.5 - 52.5 % 42.2 44.5   Platelets 170 - 864 K/uL 190 184   Sodium 136 - 145 mmol/L 139 132(L)   Potassium 3.5 - 5.1 mmol/L 4.2 4.4   BUN 7 - 20 mg/dL 20 14   Creatinine 0.8 - 1.3 mg/dL 1.1 0.9   Glucose 70 - 99 mg/dL 109(H) 113(H)   Calcium 8.3 - 10.6 mg/dL 8.5 8.8   Magnesium 1.80 - 2.40 mg/dL 1.90 -   Alk Phos 40 - 129 U/L 79 85   Albumin 3.4 - 5.0 g/dL 4.0 4.1   Bilirubin 0.0 - 1.0 mg/dL 1.0 0.9   AST 15 - 37 U/L 12(L) 20   ALT 10 - 40 U/L 13 19   Some recent data might be hidden         IMAGING:    No new chest imaging for me to review. Pulmonary Functions Testing Results:    Baseline polysomnography done on 1/19/2021  Sleep efficiency: 79.3  Overall AHI: 31.5  REM AHI: 60  Lowest oxygen saturation: 77%  Time spent below an oxygen saturation 90%: 1.6 minutes      CPAP titration done on 2/22/2021  Type of therapy: Auto BiPAP with EPAP min: 13, pressure support range of 4-8 cmH2O with IPAP max of 25 cmH2O. With medium DreamWear full facemask.       CPAP compliance summary     5/15/2021-8/12/2021 3/5/2021-5/16/2021   Days with device usage  84/90 days  67/73 days   Average Usage  6 hours 9 minutes  5 hours 38 minutes 10 seconds   Days with device usage >4hrs  87.8%  79.5%   Large Leak per night  4 hours 16 minutes  3 hours 46 minutes   AHI  5.1  8.1   CPAP settings  auto BiPAP  auto BiPAP   90th percentile pressure  IPAP: 15, EPAP: 10  IPAP: 17, EPAP: 13   Mask  fullface mask  full facemask     DME: [facility]      IMPRESSION AND RECOMMENDATIONS:     1. GORDON (obstructive sleep apnea)  -Patient has severe obstructive sleep apnea which is amenable to BiPAP therapy.  -He has been unable to use the BiPAP therapy since September 2021.  -For his sinus drainage, I will start him on Flonase nasal spray and Atrovent nasal spray. He will take Flonase in the morning and Atrovent nasal spray at nighttime. With reduced sinus drainage he may be able to put the mask on his face for longer durations of time.  -I have also advised him to reach out to his DME company for mask refit.  -He needs to register his machine as it has been recalled. -Starting on Zoloft for anxiety. Hopefully will help.  -No changes made to the pressure settings today. 2. Essential hypertension  -Currently patient on Zoloft and Neurontin. 3. Body mass index 28.0-28.9, adult  -I strongly advised the patient to make efforts to lose weight. I discussed various modalities including moderate intensity intermittent exercises, diet control and bariatric surgery. If the patient loses even 10 to 15% of current body weight, it will be beneficial in improving the overall health. 4.  Nicotine dependence  -Again encouraged patient to quit smoking. .       Return in about 3 months (around 5/16/2022). Ascencion Gramajo MD  Pulmonary Critical Care and Sleep Medicine  2/16/2022, 8:58 AM    This note was completed using dragon medical speech recognition software. Grammatical errors, random word insertions, pronoun errors and incomplete sentences are occasional consequences of this technology due to software limitations. If there are questions or concerns about the content of this note of information contained within the body of this dictation they should be addressed with the provider for clarification.

## 2022-03-15 ENCOUNTER — HOSPITAL ENCOUNTER (OUTPATIENT)
Age: 77
Discharge: HOME | End: 2022-03-15
Payer: MEDICARE

## 2022-03-15 DIAGNOSIS — N40.1 BENIGN PROSTATIC HYPERPLASIA WITH URINARY FREQUENCY: ICD-10-CM

## 2022-03-15 DIAGNOSIS — R35.0 BENIGN PROSTATIC HYPERPLASIA WITH URINARY FREQUENCY: ICD-10-CM

## 2022-03-15 DIAGNOSIS — I10 ESSENTIAL HYPERTENSION: ICD-10-CM

## 2022-03-15 DIAGNOSIS — E78.00 PURE HYPERCHOLESTEROLEMIA: ICD-10-CM

## 2022-03-15 LAB
A/G RATIO: 2.5 (ref 1.1–2.2)
ALBUMIN SERPL-MCNC: 4 G/DL (ref 3.4–5)
ALP BLD-CCNC: 67 U/L (ref 40–129)
ALT SERPL-CCNC: 14 U/L (ref 10–40)
ANION GAP SERPL CALCULATED.3IONS-SCNC: 12 MMOL/L (ref 3–16)
AST SERPL-CCNC: 14 U/L (ref 15–37)
BILIRUB SERPL-MCNC: 0.5 MG/DL (ref 0–1)
BUN BLDV-MCNC: 18 MG/DL (ref 7–20)
CALCIUM SERPL-MCNC: 9.1 MG/DL (ref 8.3–10.6)
CHLORIDE BLD-SCNC: 107 MMOL/L (ref 99–110)
CHOLESTEROL, TOTAL: 165 MG/DL (ref 0–199)
CO2: 26 MMOL/L (ref 21–32)
CREAT SERPL-MCNC: 1 MG/DL (ref 0.8–1.3)
GFR AFRICAN AMERICAN: >60
GFR NON-AFRICAN AMERICAN: >60
GLUCOSE BLD-MCNC: 105 MG/DL (ref 70–99)
HCT VFR BLD CALC: 40.5 % (ref 40.5–52.5)
HDLC SERPL-MCNC: 37 MG/DL (ref 40–60)
HEMOGLOBIN: 13.6 G/DL (ref 13.5–17.5)
LDL CHOLESTEROL CALCULATED: 97 MG/DL
MCH RBC QN AUTO: 30.9 PG (ref 26–34)
MCHC RBC AUTO-ENTMCNC: 33.6 G/DL (ref 31–36)
MCV RBC AUTO: 91.9 FL (ref 80–100)
PDW BLD-RTO: 13.2 % (ref 12.4–15.4)
PLATELET # BLD: 185 K/UL (ref 135–450)
PMV BLD AUTO: 9 FL (ref 5–10.5)
POTASSIUM SERPL-SCNC: 4.7 MMOL/L (ref 3.5–5.1)
PROSTATE SPECIFIC ANTIGEN: 0.12 NG/ML (ref 0–4)
RBC # BLD: 4.4 M/UL (ref 4.2–5.9)
SODIUM BLD-SCNC: 145 MMOL/L (ref 136–145)
TOTAL PROTEIN: 5.6 G/DL (ref 6.4–8.2)
TRIGL SERPL-MCNC: 155 MG/DL (ref 0–150)
VLDLC SERPL CALC-MCNC: 31 MG/DL
WBC # BLD: 10.6 K/UL (ref 4–11)

## 2022-03-15 PROCEDURE — 80053 COMPREHEN METABOLIC PANEL: CPT

## 2022-03-15 PROCEDURE — 80061 LIPID PANEL: CPT

## 2022-03-15 PROCEDURE — 85027 COMPLETE CBC AUTOMATED: CPT

## 2022-03-15 PROCEDURE — 36415 COLL VENOUS BLD VENIPUNCTURE: CPT

## 2022-03-15 PROCEDURE — 84153 ASSAY OF PSA TOTAL: CPT

## 2022-05-20 ENCOUNTER — HOSPITAL ENCOUNTER (OUTPATIENT)
Age: 77
Discharge: HOME | End: 2022-05-20
Payer: MEDICARE

## 2022-05-20 ENCOUNTER — HOSPITAL ENCOUNTER (OUTPATIENT)
Dept: GENERAL RADIOLOGY | Age: 77
Discharge: HOME | End: 2022-05-20
Payer: MEDICARE

## 2022-05-20 DIAGNOSIS — R63.4 WEIGHT LOSS: ICD-10-CM

## 2022-05-20 PROCEDURE — 71046 X-RAY EXAM CHEST 2 VIEWS: CPT

## 2022-05-27 ENCOUNTER — HOSPITAL ENCOUNTER (OUTPATIENT)
Dept: MRI IMAGING | Age: 77
Discharge: HOME | End: 2022-05-27
Payer: MEDICARE

## 2022-05-27 DIAGNOSIS — R29.898 LEG WEAKNESS, BILATERAL: ICD-10-CM

## 2022-05-27 PROCEDURE — 72148 MRI LUMBAR SPINE W/O DYE: CPT

## 2022-06-20 ENCOUNTER — HOSPITAL ENCOUNTER (OUTPATIENT)
Dept: CT IMAGING | Age: 77
Discharge: HOME | End: 2022-06-20
Payer: MEDICARE

## 2022-06-20 DIAGNOSIS — R91.1 PULMONARY NODULE, LEFT: ICD-10-CM

## 2022-06-20 LAB
ANION GAP SERPL CALCULATED.3IONS-SCNC: 7 MMOL/L (ref 3–16)
BUN BLDV-MCNC: 18 MG/DL (ref 7–20)
CALCIUM SERPL-MCNC: 9.3 MG/DL (ref 8.3–10.6)
CHLORIDE BLD-SCNC: 101 MMOL/L (ref 99–110)
CO2: 29 MMOL/L (ref 21–32)
CREAT SERPL-MCNC: 0.9 MG/DL (ref 0.8–1.3)
GFR AFRICAN AMERICAN: >60
GFR NON-AFRICAN AMERICAN: >60
GLUCOSE BLD-MCNC: 100 MG/DL (ref 70–99)
POTASSIUM SERPL-SCNC: 4.5 MMOL/L (ref 3.5–5.1)
SODIUM BLD-SCNC: 137 MMOL/L (ref 136–145)

## 2022-06-20 PROCEDURE — 6360000004 HC RX CONTRAST MEDICATION: Performed by: INTERNAL MEDICINE

## 2022-06-20 PROCEDURE — 36415 COLL VENOUS BLD VENIPUNCTURE: CPT

## 2022-06-20 PROCEDURE — 80048 BASIC METABOLIC PNL TOTAL CA: CPT

## 2022-06-20 PROCEDURE — 71260 CT THORAX DX C+: CPT

## 2022-06-20 RX ADMIN — IOPAMIDOL 50 ML: 755 INJECTION, SOLUTION INTRAVENOUS at 17:49

## 2022-06-24 DIAGNOSIS — R68.89 SUSPECTED MALIGNANT NEOPLASM OF LUNG: Primary | ICD-10-CM

## 2022-07-04 ENCOUNTER — APPOINTMENT (OUTPATIENT)
Dept: CT IMAGING | Age: 77
End: 2022-07-04
Payer: MEDICARE

## 2022-07-04 ENCOUNTER — HOSPITAL ENCOUNTER (OUTPATIENT)
Age: 77
Setting detail: OBSERVATION
Discharge: HOME | End: 2022-07-05
Attending: EMERGENCY MEDICINE | Admitting: INTERNAL MEDICINE
Payer: MEDICARE

## 2022-07-04 ENCOUNTER — APPOINTMENT (OUTPATIENT)
Dept: GENERAL RADIOLOGY | Age: 77
End: 2022-07-04
Payer: MEDICARE

## 2022-07-04 DIAGNOSIS — F41.1 GENERALIZED ANXIETY DISORDER WITH PANIC ATTACKS: ICD-10-CM

## 2022-07-04 DIAGNOSIS — R91.8 LUNG MASS: ICD-10-CM

## 2022-07-04 DIAGNOSIS — M51.35 DDD (DEGENERATIVE DISC DISEASE), THORACOLUMBAR: ICD-10-CM

## 2022-07-04 DIAGNOSIS — R41.89 COGNITIVE CHANGES: ICD-10-CM

## 2022-07-04 DIAGNOSIS — F41.9 ANXIOUSNESS: Primary | ICD-10-CM

## 2022-07-04 DIAGNOSIS — F41.0 GENERALIZED ANXIETY DISORDER WITH PANIC ATTACKS: ICD-10-CM

## 2022-07-04 DIAGNOSIS — R59.0 RETROPERITONEAL LYMPHADENOPATHY: ICD-10-CM

## 2022-07-04 PROBLEM — R41.82 ALTERED MENTAL STATUS: Status: ACTIVE | Noted: 2022-07-04

## 2022-07-04 LAB
A/G RATIO: 2.6 (ref 1.1–2.2)
ALBUMIN SERPL-MCNC: 4.7 G/DL (ref 3.4–5)
ALP BLD-CCNC: 60 U/L (ref 40–129)
ALT SERPL-CCNC: 13 U/L (ref 10–40)
ANION GAP SERPL CALCULATED.3IONS-SCNC: 8 MMOL/L (ref 3–16)
AST SERPL-CCNC: 13 U/L (ref 15–37)
BACTERIA: ABNORMAL /HPF
BASOPHILS ABSOLUTE: 0.1 K/UL (ref 0–0.2)
BASOPHILS RELATIVE PERCENT: 0.8 %
BILIRUB SERPL-MCNC: 1.5 MG/DL (ref 0–1)
BILIRUBIN URINE: NEGATIVE
BLOOD, URINE: ABNORMAL
BUN BLDV-MCNC: 18 MG/DL (ref 7–20)
CALCIUM SERPL-MCNC: 10 MG/DL (ref 8.3–10.6)
CHLORIDE BLD-SCNC: 107 MMOL/L (ref 99–110)
CLARITY: CLEAR
CO2: 28 MMOL/L (ref 21–32)
COLOR: YELLOW
COMMENT UA: ABNORMAL
CREAT SERPL-MCNC: 0.9 MG/DL (ref 0.8–1.3)
EOSINOPHILS ABSOLUTE: 0 K/UL (ref 0–0.6)
EOSINOPHILS RELATIVE PERCENT: 0.5 %
EPITHELIAL CELLS, UA: 4 /HPF (ref 0–5)
GFR AFRICAN AMERICAN: >60
GFR NON-AFRICAN AMERICAN: >60
GLUCOSE BLD-MCNC: 113 MG/DL (ref 70–99)
GLUCOSE URINE: NEGATIVE MG/DL
HCT VFR BLD CALC: 43.9 % (ref 40.5–52.5)
HEMOGLOBIN: 15 G/DL (ref 13.5–17.5)
HYALINE CASTS: 3 /LPF (ref 0–8)
INR BLD: 1.78 (ref 0.87–1.14)
KETONES, URINE: NEGATIVE MG/DL
LEUKOCYTE ESTERASE, URINE: NEGATIVE
LIPASE: 18 U/L (ref 13–60)
LYMPHOCYTES ABSOLUTE: 2.9 K/UL (ref 1–5.1)
LYMPHOCYTES RELATIVE PERCENT: 29.4 %
MCH RBC QN AUTO: 30.9 PG (ref 26–34)
MCHC RBC AUTO-ENTMCNC: 34.2 G/DL (ref 31–36)
MCV RBC AUTO: 90.3 FL (ref 80–100)
MICROSCOPIC EXAMINATION: YES
MONOCYTES ABSOLUTE: 0.5 K/UL (ref 0–1.3)
MONOCYTES RELATIVE PERCENT: 4.7 %
NEUTROPHILS ABSOLUTE: 6.4 K/UL (ref 1.7–7.7)
NEUTROPHILS RELATIVE PERCENT: 64.6 %
NITRITE, URINE: NEGATIVE
PDW BLD-RTO: 13.8 % (ref 12.4–15.4)
PH UA: 5.5 (ref 5–8)
PLATELET # BLD: 200 K/UL (ref 135–450)
PMV BLD AUTO: 9.2 FL (ref 5–10.5)
POTASSIUM REFLEX MAGNESIUM: 4.5 MMOL/L (ref 3.5–5.1)
PRO-BNP: 124 PG/ML (ref 0–449)
PROTEIN UA: NEGATIVE MG/DL
PROTHROMBIN TIME: 20.7 SEC (ref 11.7–14.5)
RBC # BLD: 4.85 M/UL (ref 4.2–5.9)
RBC UA: 23 /HPF (ref 0–4)
SODIUM BLD-SCNC: 143 MMOL/L (ref 136–145)
SPECIFIC GRAVITY UA: 1.02 (ref 1–1.03)
TOTAL PROTEIN: 6.5 G/DL (ref 6.4–8.2)
TROPONIN: <0.01 NG/ML
URINE REFLEX TO CULTURE: ABNORMAL
URINE TYPE: ABNORMAL
UROBILINOGEN, URINE: 0.2 E.U./DL
WBC # BLD: 9.9 K/UL (ref 4–11)
WBC UA: 4 /HPF (ref 0–5)

## 2022-07-04 PROCEDURE — 83690 ASSAY OF LIPASE: CPT

## 2022-07-04 PROCEDURE — G0378 HOSPITAL OBSERVATION PER HR: HCPCS

## 2022-07-04 PROCEDURE — 85025 COMPLETE CBC W/AUTO DIFF WBC: CPT

## 2022-07-04 PROCEDURE — 84484 ASSAY OF TROPONIN QUANT: CPT

## 2022-07-04 PROCEDURE — 6370000000 HC RX 637 (ALT 250 FOR IP): Performed by: EMERGENCY MEDICINE

## 2022-07-04 PROCEDURE — 6370000000 HC RX 637 (ALT 250 FOR IP): Performed by: INTERNAL MEDICINE

## 2022-07-04 PROCEDURE — 74176 CT ABD & PELVIS W/O CONTRAST: CPT

## 2022-07-04 PROCEDURE — 80053 COMPREHEN METABOLIC PANEL: CPT

## 2022-07-04 PROCEDURE — 99285 EMERGENCY DEPT VISIT HI MDM: CPT

## 2022-07-04 PROCEDURE — 70450 CT HEAD/BRAIN W/O DYE: CPT

## 2022-07-04 PROCEDURE — 85610 PROTHROMBIN TIME: CPT

## 2022-07-04 PROCEDURE — 83880 ASSAY OF NATRIURETIC PEPTIDE: CPT

## 2022-07-04 PROCEDURE — 93005 ELECTROCARDIOGRAM TRACING: CPT | Performed by: EMERGENCY MEDICINE

## 2022-07-04 PROCEDURE — 71045 X-RAY EXAM CHEST 1 VIEW: CPT

## 2022-07-04 PROCEDURE — 81001 URINALYSIS AUTO W/SCOPE: CPT

## 2022-07-04 PROCEDURE — 36415 COLL VENOUS BLD VENIPUNCTURE: CPT

## 2022-07-04 PROCEDURE — 99222 1ST HOSP IP/OBS MODERATE 55: CPT | Performed by: INTERNAL MEDICINE

## 2022-07-04 RX ORDER — BUTALBITAL, ACETAMINOPHEN AND CAFFEINE 50; 325; 40 MG/1; MG/1; MG/1
1 TABLET ORAL EVERY 6 HOURS PRN
Status: DISCONTINUED | OUTPATIENT
Start: 2022-07-04 | End: 2022-07-05 | Stop reason: HOSPADM

## 2022-07-04 RX ORDER — LORAZEPAM 1 MG/1
1 TABLET ORAL ONCE
Status: COMPLETED | OUTPATIENT
Start: 2022-07-04 | End: 2022-07-04

## 2022-07-04 RX ORDER — IPRATROPIUM BROMIDE 21 UG/1
2 SPRAY, METERED NASAL DAILY
Status: DISCONTINUED | OUTPATIENT
Start: 2022-07-04 | End: 2022-07-04

## 2022-07-04 RX ORDER — PANTOPRAZOLE SODIUM 40 MG/1
40 TABLET, DELAYED RELEASE ORAL
Status: DISCONTINUED | OUTPATIENT
Start: 2022-07-05 | End: 2022-07-05 | Stop reason: HOSPADM

## 2022-07-04 RX ORDER — GABAPENTIN 300 MG/1
300 CAPSULE ORAL 2 TIMES DAILY
Status: DISCONTINUED | OUTPATIENT
Start: 2022-07-04 | End: 2022-07-05 | Stop reason: HOSPADM

## 2022-07-04 RX ORDER — IBUPROFEN 400 MG/1
400 TABLET ORAL EVERY 6 HOURS PRN
Status: DISCONTINUED | OUTPATIENT
Start: 2022-07-04 | End: 2022-07-05 | Stop reason: HOSPADM

## 2022-07-04 RX ORDER — IBUPROFEN 400 MG/1
400 TABLET ORAL EVERY 6 HOURS PRN
COMMUNITY
End: 2022-09-15 | Stop reason: SDUPTHER

## 2022-07-04 RX ORDER — FLUTICASONE PROPIONATE 50 MCG
1 SPRAY, SUSPENSION (ML) NASAL DAILY
Status: DISCONTINUED | OUTPATIENT
Start: 2022-07-04 | End: 2022-07-04

## 2022-07-04 RX ORDER — HYDROCODONE BITARTRATE AND ACETAMINOPHEN 7.5; 325 MG/1; MG/1
1 TABLET ORAL EVERY 4 HOURS PRN
Status: DISCONTINUED | OUTPATIENT
Start: 2022-07-04 | End: 2022-07-05 | Stop reason: HOSPADM

## 2022-07-04 RX ORDER — DOXAZOSIN MESYLATE 1 MG/1
2 TABLET ORAL DAILY
Status: DISCONTINUED | OUTPATIENT
Start: 2022-07-05 | End: 2022-07-05 | Stop reason: HOSPADM

## 2022-07-04 RX ORDER — ATORVASTATIN CALCIUM 10 MG/1
5 TABLET, FILM COATED ORAL NIGHTLY
Status: DISCONTINUED | OUTPATIENT
Start: 2022-07-04 | End: 2022-07-05 | Stop reason: HOSPADM

## 2022-07-04 RX ORDER — FINASTERIDE 5 MG/1
5 TABLET, FILM COATED ORAL DAILY
Status: DISCONTINUED | OUTPATIENT
Start: 2022-07-04 | End: 2022-07-05 | Stop reason: HOSPADM

## 2022-07-04 RX ORDER — ONDANSETRON 4 MG/1
4 TABLET, ORALLY DISINTEGRATING ORAL EVERY 8 HOURS PRN
Status: DISCONTINUED | OUTPATIENT
Start: 2022-07-04 | End: 2022-07-05 | Stop reason: HOSPADM

## 2022-07-04 RX ORDER — TAMSULOSIN HYDROCHLORIDE 0.4 MG/1
0.4 CAPSULE ORAL DAILY
Status: DISCONTINUED | OUTPATIENT
Start: 2022-07-05 | End: 2022-07-05 | Stop reason: HOSPADM

## 2022-07-04 RX ORDER — CITALOPRAM 20 MG/1
40 TABLET ORAL DAILY
Status: DISCONTINUED | OUTPATIENT
Start: 2022-07-05 | End: 2022-07-05 | Stop reason: HOSPADM

## 2022-07-04 RX ORDER — LORAZEPAM 1 MG/1
1 TABLET ORAL EVERY 4 HOURS PRN
Status: DISCONTINUED | OUTPATIENT
Start: 2022-07-04 | End: 2022-07-05 | Stop reason: HOSPADM

## 2022-07-04 RX ADMIN — FINASTERIDE 5 MG: 5 TABLET, FILM COATED ORAL at 18:47

## 2022-07-04 RX ADMIN — IBUPROFEN 400 MG: 400 TABLET ORAL at 18:47

## 2022-07-04 RX ADMIN — METOPROLOL TARTRATE 12.5 MG: 25 TABLET, FILM COATED ORAL at 21:04

## 2022-07-04 RX ADMIN — LORAZEPAM 1 MG: 1 TABLET ORAL at 12:37

## 2022-07-04 RX ADMIN — ATORVASTATIN CALCIUM 5 MG: 10 TABLET, FILM COATED ORAL at 21:03

## 2022-07-04 RX ADMIN — GABAPENTIN 300 MG: 300 CAPSULE ORAL at 21:03

## 2022-07-04 ASSESSMENT — PAIN - FUNCTIONAL ASSESSMENT: PAIN_FUNCTIONAL_ASSESSMENT: 0-10

## 2022-07-04 ASSESSMENT — PAIN SCALES - GENERAL
PAINLEVEL_OUTOF10: 5
PAINLEVEL_OUTOF10: 0
PAINLEVEL_OUTOF10: 5

## 2022-07-04 NOTE — PROGRESS NOTES
4 Eyes Skin Assessment     NAME:  Anila Vo  YOB: 1945  MEDICAL RECORD NUMBER:  [id number]    The patient is being assess for  Admission    I agree that 2 RN's have performed a thorough Head to Toe Skin Assessment on the patient. ALL assessment sites listed below have been assessed. Areas assessed by both nurses:    Head, Face, Ears, Shoulders, Back, Chest, Arms, Elbows, Hands, Sacrum. Buttock, Coccyx, Ischium and Legs. Feet and Heels        Does the Patient have a Wound?  No noted wound(s)       Eugenio Prevention initiated:  n/a   Wound Care Orders initiated:  NA    Pressure Injury (Stage 3,4, Unstageable, DTI, NWPT, and Complex wounds) if present place referral/consult order under [de-identified] NA    New and Established Ostomies if present place consult order under : NA      Nurse 1 eSignature: Electronically signed by Sylvia Mendoza RN on 7/4/22 at 6:39 PM EDT    **SHARE this note so that the co-signing nurse is able to place an eSignature**    Nurse 2 eSignature: Electronically signed by Claude Peralta RN on 7/4/22 at 7:00 PM EDT

## 2022-07-04 NOTE — CONSULTS
[facility] Pulmonary and Critical Care   Consult Note      Reason for Consult: Lung nodule evaluation  Requesting Physician: Ulice Aschoff    Subjective:   CHIEF COMPLAINT: Panic attack/anxiety     HPI: Patient states that lately he has been having more anxiety episodes, generally over his wellbeing. His appetite is poor-apparently has lost approximately 20 pounds since earlier this year. Also recent CT scan of his chest performed on 6/20 by PCP revealed 8 mm left upper lobe lung nodule which was marginating the mediastinum. He is very concerned that this could be related to cancer. States that he has lost 2 sisters, 2 nieces and a dog over the last 2 years. Patient presented to the ER for evaluation of anxiety-pulmonary consultation for recent incidental finding of left upper lobe lung nodule. Patient currently denies shortness of breath, chest pain or tightness or cough. Smoker, lifelong currently smokes half pack per day. Has tried nicotine patches and Chantix with no success. Denies any history of COPD. Has history of atrial fibrillation status post ablation. No prior history of cancers. GORDON, currently not using BiPAP. The patient is a 77 y.o. male with significant past medical history of:      Diagnosis Date    A-fib ([facility] [address]     Hypercholesteremia     Hypertension         Past Surgical History:        Procedure Laterality Date    COLONOSCOPY  05/24/2021    HEMORRHOID SURGERY      UROLIFT    SHOULDER SURGERY Right     RTC      Current Medications:    No current facility-administered medications for this encounter. Allergies   Allergen Reactions    Shellfish Allergy     Augmentin [Amoxicillin-Pot Clavulanate] Nausea And Vomiting       Social History:    TOBACCO:   reports that he has been smoking cigarettes. He has a 25.00 pack-year smoking history. He quit smokeless tobacco use about 11 years ago. ETOH:   reports no history of alcohol use.   Patient currently lives independently    Family History:       Problem Relation Age of Onset    Cancer Other     High Blood Pressure Other     Stroke Other        REVIEW OF SYSTEMS:    Constitutional: Weight loss+  Ears, nose, mouth, throat: negative for ear drainage, epistaxis, hoarseness, nasal congestion, sore throat and voice change  Respiratory: negative for shortness of breath, cough, phlegm or pleurisy  Cardiovascular: negative for chest pain, chest pressure/discomfort, irregular heart beat, lower extremity edema and palpitations  Gastrointestinal: negative for abdominal pain, constipation, diarrhea, jaundice, melena, odynophagia, reflux symptoms and vomiting  Hematologic/lymphatic: negative for bleeding, easy bruising, lymphadenopathy and petechiae  Musculoskeletal:negative for arthralgias, bone pain, muscle weakness, neck pain and stiff joints  Neurological: negative for dizziness, gait problems, headaches, seizures, speech problems, tremors and weakness  Behavioral/Psych: negative for anxiety, behavior problems, depression, fatigue and sleep disturbance  Endocrine: negative for diabetic symptoms including none, neuropathy, polyphagia, polyuria, polydipsia, vomiting and diarrhea and temperature intolerance  Allergic/Immunologic: negative for anaphylaxis, angioedema, hay fever and urticaria      Objective:     Patient Vitals for the past 8 hrs:   BP Temp Pulse Resp SpO2 Height Weight   07/04/22 1545 106/64 -- -- -- 100 % -- --   07/04/22 1500 103/61 -- -- -- 96 % -- --   07/04/22 1400 119/72 -- -- -- 95 % -- --   07/04/22 1330 (!) 104/53 -- -- -- 96 % -- --   07/04/22 1300 103/68 -- -- -- 96 % -- --   07/04/22 1245 119/78 -- -- -- 97 % -- --   07/04/22 1230 (!) 115/59 -- -- -- 95 % -- --   07/04/22 1215 104/75 -- 80 -- 95 % -- --   07/04/22 1023 (!) 123/55 98.4 °F (36.9 °C) 83 16 96 % 5' 9\" (1.753 m) 171 lb (77.6 kg)     No intake/output data recorded. No intake/output data recorded.     Physical Exam:  General Appearance: alert and oriented to person, place and time, well developed and well- nourished, in no acute distress  Skin: warm and dry, no rash or erythema  Head: normocephalic and atraumatic  Eyes: pupils equal, round, and reactive to light, extraocular eye movements intact, conjunctivae normal  ENT: external ear and ear canal normal bilaterally, nose without deformity, nasal mucosa and turbinates normal  Neck: supple and non-tender without mass, no cervical lymphadenopathy  Pulmonary/Chest: clear to auscultation bilaterally- no wheezes, rales or rhonchi, normal air movement, no respiratory distress  Cardiovascular: normal rate, regular rhythm,  no murmurs, rubs, distal pulses intact, no carotid bruits  Abdomen: soft, non-tender, non-distended, normal bowel sounds, no masses or organomegaly  Lymph Nodes: Cervical, supraclavicular normal  Extremities: no cyanosis, clubbing or edema  Musculoskeletal: normal range of motion, no joint swelling, deformity or tenderness  Neurologic: alert, no focal neurologic deficits    Data Review:  CBC:   Lab Results   Component Value Date/Time    WBC 9.9 07/04/2022 11:13 AM    RBC 4.85 07/04/2022 11:13 AM     BMP:   Lab Results   Component Value Date/Time    GLUCOSE 113 07/04/2022 11:13 AM    CO2 28 07/04/2022 11:13 AM    BUN 18 07/04/2022 11:13 AM    CREATININE 0.9 07/04/2022 11:13 AM    CALCIUM 10.0 07/04/2022 11:13 AM     ABG: No results found for: OQC1QAA, BEART, L4VTTUBK, PHART, THGBART, RZX2YXV, PO2ART, NDE2TIJ    Radiology: All pertinent images / reports were reviewed as a part of this visit. Narrative   EXAMINATION:   CT OF THE CHEST WITH CONTRAST 6/20/2022 5:49 pm       TECHNIQUE:   CT of the chest was performed with the administration of intravenous   contrast. Multiplanar reformatted images are provided for review.  Automated   exposure control, iterative reconstruction, and/or weight based adjustment of   the mA/kV was utilized to reduce the radiation dose to as low as reasonably   achievable.       COMPARISON: Recent chest x-ray       Prior chest CT 2017       HISTORY:   ORDERING SYSTEM PROVIDED HISTORY: Pulmonary nodule, left   TECHNOLOGIST PROVIDED HISTORY:   STAT Creatinine as needed:->No   Reason for Exam: Dx: Pulmonary nodule, left R91.1 (ICD-10-CM).     FINDINGS:   Mediastinum: Calcifications are seen in the thyroid.  Hypodense nodule seen   in the thyroid measuring 4 mm in size or less.  Small mediastinal and hilar   nodes are noted.  No pericardial effusion is seen.  Small hiatal hernia seen. There is nonspecific thickening at the GE junction.  Small calcified and   noncalcified mediastinal nodes are noted.       Lungs/pleura: Respiratory motion artifact limits evaluation of fine pulmonary   parenchymal change.  Mild underlying emphysema is seen.  Mosaic attenuation   is seen throughout the lungs, suggesting small airways disease or air   trapping.  There is mild fusiform bronchiectasis.  Scarring is seen in the   lingula.  Scarring is seen in the right middle lobe.       9 mm by 8 mm nodule is seen in the left upper lobe. .  This is increased in   size compared to 2017       Upper Abdomen: Right adrenal gland is normal.  Hypodense nodule seen in left   adrenal gland measuring 1.5 cm, compatible with adenoma.  Small   calcifications seen in the left adrenal gland. .  This is unchanged there is   mild splenomegaly       Stones are seen in the right kidney measuring 4 mm in size       Small lymph nodes are seen in the right periaortic region, incompletely   evaluated,, probably unchanged compared to prior chest CT       Soft Tissues/Bones: High spurring is seen in the spine.  Spurring is seen in   the shoulder joints.  Small axillary nodes are seen, measuring up to 1.4 cm x   10 mm on the right., probably unchanged compared to prior chest CT.           Impression   9 mm nodular density is seen in the left upper lobe.  This is suspicious for   early lung carcinoma given that it appears increased in size compared to   2017. Betina Patel PET-CT         Problem List:   Left upper lobe lung nodule    Assessment/Plan:     Reviewed patient's CT chest with contrast performed on 6/20-8 mm left upper lobe lung nodule marginating mediastinum, slightly larger in comparison to prior CT scan from 2017. Likely benign, but patient has a long smoking history. Agree with outpatient PET scan, if negative will need to continue with serial CT imaging. Advised the patient and wife about the plan. Patient already follows up with Dr. Pilar Schmidt for GORDON, has upcoming appointment on 7/21.     Jnuaid Bermudez MD

## 2022-07-05 VITALS
SYSTOLIC BLOOD PRESSURE: 129 MMHG | WEIGHT: 172.56 LBS | HEIGHT: 69 IN | RESPIRATION RATE: 16 BRPM | TEMPERATURE: 98 F | OXYGEN SATURATION: 95 % | DIASTOLIC BLOOD PRESSURE: 73 MMHG | HEART RATE: 71 BPM | BODY MASS INDEX: 25.56 KG/M2

## 2022-07-05 PROBLEM — F32.4 MAJOR DEPRESSIVE DISORDER WITH SINGLE EPISODE, IN PARTIAL REMISSION (HCC): Chronic | Status: RESOLVED | Noted: 2018-11-02 | Resolved: 2022-07-05

## 2022-07-05 PROBLEM — F48.8 NERVOUS BREAKDOWN: Status: ACTIVE | Noted: 2022-07-05

## 2022-07-05 LAB
FOLATE: 7.6 NG/ML (ref 4.78–24.2)
GLUCOSE BLD-MCNC: 108 MG/DL (ref 70–99)
PERFORMED ON: ABNORMAL
TSH REFLEX: 1 UIU/ML (ref 0.27–4.2)
VITAMIN B-12: 333 PG/ML (ref 211–911)

## 2022-07-05 PROCEDURE — 99203 OFFICE O/P NEW LOW 30 MIN: CPT | Performed by: PSYCHIATRY & NEUROLOGY

## 2022-07-05 PROCEDURE — 99232 SBSQ HOSP IP/OBS MODERATE 35: CPT | Performed by: INTERNAL MEDICINE

## 2022-07-05 PROCEDURE — 84443 ASSAY THYROID STIM HORMONE: CPT

## 2022-07-05 PROCEDURE — 82607 VITAMIN B-12: CPT

## 2022-07-05 PROCEDURE — 82746 ASSAY OF FOLIC ACID SERUM: CPT

## 2022-07-05 PROCEDURE — 87449 NOS EACH ORGANISM AG IA: CPT

## 2022-07-05 PROCEDURE — G0378 HOSPITAL OBSERVATION PER HR: HCPCS

## 2022-07-05 PROCEDURE — 6370000000 HC RX 637 (ALT 250 FOR IP): Performed by: INTERNAL MEDICINE

## 2022-07-05 PROCEDURE — 36415 COLL VENOUS BLD VENIPUNCTURE: CPT

## 2022-07-05 RX ORDER — ONDANSETRON 4 MG/1
4 TABLET, ORALLY DISINTEGRATING ORAL EVERY 8 HOURS PRN
Qty: 30 TABLET | Refills: 0 | Status: SHIPPED | OUTPATIENT
Start: 2022-07-05 | End: 2022-07-14 | Stop reason: SDUPTHER

## 2022-07-05 RX ORDER — CLONAZEPAM 0.5 MG/1
0.25 TABLET ORAL 2 TIMES DAILY PRN
Qty: 30 TABLET | Refills: 0 | Status: SHIPPED | OUTPATIENT
Start: 2022-07-05 | End: 2022-07-14 | Stop reason: SDUPTHER

## 2022-07-05 RX ORDER — HYDROCODONE BITARTRATE AND ACETAMINOPHEN 7.5; 325 MG/1; MG/1
1 TABLET ORAL EVERY 4 HOURS PRN
Qty: 30 TABLET | Refills: 0 | Status: SHIPPED | OUTPATIENT
Start: 2022-07-05 | End: 2022-07-12

## 2022-07-05 RX ADMIN — RIVAROXABAN 20 MG: 20 TABLET, FILM COATED ORAL at 07:41

## 2022-07-05 RX ADMIN — FINASTERIDE 5 MG: 5 TABLET, FILM COATED ORAL at 07:42

## 2022-07-05 RX ADMIN — LORAZEPAM 1 MG: 1 TABLET ORAL at 18:08

## 2022-07-05 RX ADMIN — TAMSULOSIN HYDROCHLORIDE 0.4 MG: 0.4 CAPSULE ORAL at 07:41

## 2022-07-05 RX ADMIN — DOXAZOSIN 2 MG: 1 TABLET ORAL at 07:41

## 2022-07-05 RX ADMIN — PANTOPRAZOLE SODIUM 40 MG: 40 TABLET, DELAYED RELEASE ORAL at 07:41

## 2022-07-05 RX ADMIN — GABAPENTIN 300 MG: 300 CAPSULE ORAL at 07:41

## 2022-07-05 RX ADMIN — CITALOPRAM HYDROBROMIDE 40 MG: 20 TABLET ORAL at 07:41

## 2022-07-05 RX ADMIN — METOPROLOL TARTRATE 12.5 MG: 25 TABLET, FILM COATED ORAL at 07:41

## 2022-07-05 ASSESSMENT — PAIN SCALES - GENERAL
PAINLEVEL_OUTOF10: 0

## 2022-07-05 NOTE — PROGRESS NOTES
Patient Active Problem List   Diagnosis    Essential hypertension    Spinal stenosis, lumbar region with neurogenic claudication    Pure hypercholesterolemia    Peripheral neuropathy    Body mass index 28.0-28.9, adult    DDD (degenerative disc disease), thoracolumbar    Tobacco abuse    Chronic idiopathic constipation    Benign prostatic hyperplasia with urinary frequency    Overactive bladder    Major depressive disorder    [facility]    Coronary artery disease due to lipid rich plaque    Sciatica    GORDON (obstructive sleep apnea)    Altered mental status    Nervous breakdown   H&P dictated

## 2022-07-05 NOTE — H&P
[facility]                     [facility], [address]                              HISTORY AND PHYSICAL    PATIENT NAME: Fernanda Barnes                       :        1945  MED REC NO:   [id number]                          ROOM:       7975  ACCOUNT NO:   [de-identified]                           ADMIT DATE: 2022  PROVIDER:     Gato Heller MD    HISTORY OF PRESENT ILLNESS:  The patient is a 25-year-old white man  brought in by his girlfriend with severe panic attacks, extreme  nervousness and a psychological breakdown. The patient not following  commands. The patient appeared totally confused, not eating. Also has  intractable back pain, not able to ambulate for too long. There was  some abdominal pain. No diarrhea. The patient did have some nausea. No emesis. According to the girlfriend, the patient has extreme fear of  dying and he is extremely worried and agitated with frequent  tearfulness. Denied any headache. No loss of consciousness. No  convulsions. There is no focal weakness at rest.  He is totally  overwhelmed by his present living situation and his main concern is who  will look after the girlfriend, who is already suffering from multiple  sclerosis, if something happens to him. He has lost several friends and  family. His appetite is poor. He has been losing some weight. He has  multiple anxiety reaction episodes during the day. He is otherwise  quite functional retired person. He was started on Rexulti and did  better for a while, but again now he is getting treatment at this time. He is also currently under Neurosurgery Service for severe spinal  stenosis and foraminal stenosis in the lumbar region and has been  scheduled for epidural steroid injection. According to the girlfriend,  he is totally confused and this is definitely a very big change. He  does have some cognitive fog.   He says his mind is going at 100 miles an  hour, but his body is not even moving. He is somewhat disoriented,  forgetful, remains anxious and at times tearful. PAST MEDICAL HISTORY:  Pertinent for atrial fibrillation,  hyperlipidemia, spinal stenosis, hypertension, and recently discovered  lung neoplasm which is being evaluated by Pulmonology. PAST SURGICAL HISTORY:  Pertinent for colonoscopy, hemorrhoidectomy, and  shoulder surgery. FAMILY HISTORY:  Both his parents are  of natural causes. No  further history available. MEDICATIONS:  The patient is on atorvastatin, Rexulti, citalopram,  Cardura, Proscar, gabapentin, lorazepam, metoprolol, Protonix, Xarelto,  tamsulosin, Norco, Fioricet, Motrin and Zofran. ALLERGIES:  The patient is allergic to SHELLFISH and AUGMENTIN. SOCIAL HISTORY:  The patient is a  man. He has two children  from previous. He is presently in a living relationship with a  girlfriend, who is also disabled and wheelchair-bound suffering from  multiple sclerosis. They cohabitate together in her home. He has been  a fairly heavy smoker and he still smokes about half to three-fourth of  a pack a day. He does not have any history of alcohol. He does not  have any history of substance abuse. He has been a retiree from  [employer] and [employer]. REVIEW OF SYSTEMS:  Negative for loss of consciousness. No convulsions. No visual blurring. No dysphagia. Overall losing some weight. The  patient is extremely nervous, anxious, at times tearful, but positively  denies any suicidal ideation. No angina pectoris. Does have exertional  shortness of breath. Does have intractable back pain. Does have at  times leg weakness, worse on ambulation with neurologic claudication. No genitourinary complaint, except occasional dysuria.     PHYSICAL EXAMINATION:  GENERAL:  An alert, awake, oriented x2, moderately distressed, confused  27-year-old white man looking somewhat older than his stated age.  VITAL SIGNS:  His temperature is 98.4, blood pressure 123/55,  respirations 16, heart rate is 84. O2 sat 96% on room air. HEENT:  Oral mucosa dry. SKIN:  Warm and dry. NECK:  Supple. No jugular venous distention. No lymphadenopathy. No  carotid bruit. LUNGS:  Vesicular breath sounds. Prolonged exhalation. No wheezing. HEART:  Irregular rate and rhythm. S1, S2.  A 1/6 systolic ejection  murmur. No gallop rhythm. ABDOMEN:  Soft, nontender. Bowel sounds present. No guarding, rebound,  or rigidity. EXTREMITIES:  Show no cyanosis or edema. Distal pulsations are weak. NEUROLOGIC:  The patient does have some lower extremity numbness. There  is no gross motor weakness, at least he is able to raise all the four  extremities against mild resistance with a power of grade 4 at least in  all the four extremities. Babinski is absent. LABORATORY EVALUATION:  Shows sodium 143, potassium 4.5, chloride 107,  CO2 28, BUN 18, creatinine 0.9, calcium 10.0. ProBNP level 124. Troponin less than 0.01. Liver panel within normal limit. White blood  cell count 9.9, hemoglobin and hematocrit are 15 and 43.9, platelet  count is 121. Coagulation profile shows PT of 20.7 and INR of 1.78  consistent with his long-term oral anticoagulation. Urinalysis negative  for UTI. The patient had a CT scan of the head without contrast and CT  scan of the abdomen. I wonder why were any of them done to begin with. The patient has mild small vessel ischemic disease on CAT scan of the  head. CT of the abdomen and pelvis shows nonobstructing right-sided  nephrolithiasis with persistent retroperitoneal and iliac chain  adenopathy, which could be reactive or neoplastic with a small hiatal  hernia.   A recent CT scan of the chest, which is being evaluated by the  pulmonologist, shows 9-mm nodular density in the left upper lobe, which  is suspicious for early lung carcinoma given that it appears increased  in size compared to 2017.  A PET CT has been recommended. In this  regard, the patient has already seen the pulmonologist.  Radiology  referral has been made to do well CT-guided lung biopsy. It is not very  clear if the pulmonologist has seen him. A referral has been made to  Dr. Stanford Eldridge for lung evaluation. ASSESSMENT:  Acute change in mental status, nervous breakdown, anxiety  neurosis, severe major depression, pulmonary nodule suspicious for  bronchogenic carcinoma. PLAN:  Plan is get him admitted. We will increase the Rexulti. We will  give the patient IV lorazepam for these acute reaction. We will also  give the patient Pulmonology consult for further workup on the pulmonary  nodule.         Mary Guajardo MD    D: 07/05/2022 10:23:30       T: 07/05/2022 10:27:00     [id number]  Job#: [id number]     Doc#: [id number]    CC:

## 2022-07-05 NOTE — CONSULTS
PSYCHIATRY CONSULT, INITIAL EVALUATION    Referring Provider:  Tom Aguilera MD    CC/Reason for Consult: acute nervous breakdown      ASSESSMENT:   78 yo M with acute episode of severe anxiety, confusion. There is also significant depression. He is doing better since his admission and seems his mentation has improved. But still with severe anxiety and depression. There is a chance he is having an adverse reaction to the rexulti and it is making him worse, but I think this is less likely. 1. Generalized anxiety disorder with panic attacks  2. Major depressive disorder, recurrent, moderate    RECOMMENDATIONS:   1. Seems reasonable to try to go up on rexulti to 3mg for now, but if he feels worse over the next 1-2 weeks, would consider tapering him off it in case it is making him worse. 2. Buspar can be considered in augmentation if the rexulti is not effective enough or he has side effects from this. Other slternatives to rexulti that are antipsychotics for depression may include olanzapine 2.5-5mg bedtime, especially if weight loss is a continued problem as this can stimulate appetite. 3. Continue citalopram 40mg daily. QTc was reviewed and wnl.   4. I will provide referral options for outpatient psychiatrists as well as info on PHP treatment through [facility] if his condition worsens. 5. I will check tsh, b12, folate to rule out contributing factors to cognitive issues or anxiety/depression    Dispo: does not require inpatient psychiatric admission. Ok to d/c home from psych standpoint. Safety: RF include mood disorder, anxiety disorder, age, male. Pt is low risk for future dangerousness to self or others    Thank you for this consult, please call the psychiatry consult line for further questions. I will sign off at this time. ____________________________________________________________________________    HPI:   context: Pt is a 78 yo M who presented with panic attacks and severe anxiety. He was apparently very confused, not following commands. Noted by girlfriend to be very worried, frequently tearful. Consult placed for psychiatric evaluation. He was noted to have a pulmonary nodule so he was admitted for further eval of this. He had 1 dose of lorazepam 1mg yesterday on admission. associated symptoms:   Pt reports that his mind is racing and going at 100 mph and he can't stop the worries. He worries continuously about losing loved ones, reflects on the ones he has already lost, and has a hard time stopping these thoughts. Will have feelings of guilt, anhedonia, severe fatigue and hypersomnia, loss of appetite and about 20 lbs of weight loss. He denies any suicidal ideation. He feels better than when he first came in and thinks things are improving, but is very worried about how he will get better. He is willing to get help for his condition and does not view his condition as completely hopeless. There has not been any manic symptoms - no rapid speech or impulsive behavior. modifying factors:   He feels citalopram has been helpful, but he isn't as sure about rexulti, - he questions at times if it has made him worse. He does have issues with RLS but this predated the start of rexulti. He lost his sister a couple yrs ago and has has several friends who have passed away. His girlfriend has MS and this also raises anxiety.      Timing: subacute on chronic  duration: worse over the last 2- 3months  severity: severe    ROS:   Gen: no fevers or chills  HEENT: no vision or hearing prob  CV: no cp  Resp: no dyspnea  : no dysuria  MSK: no muscle or joint pain  GI:  No n/v  Skin: no rashes  Neuro: +tremors  Endo: +weight loss    Past Psychiatric History:    Hosp: denies   Diagnoses: anxiety, depression   Med trials: celexa, rexulti   Outpt: denies psych treatment in the past   NSSI: denies   Suicide Attempts: denies    Substance Use History:   Nicotine: smokes cigarettes, trying to quit   Alcohol: denies   Illicits: denies    Past Medical History:   Past Medical History:   Diagnosis Date    A-fib ([facility] [address]     Hypercholesteremia     Hypertension      Past Surgical History:   Procedure Laterality Date    COLONOSCOPY  05/24/2021    HEMORRHOID SURGERY      UROLIFT    SHOULDER SURGERY Right     RTC        Social/Developmental History:    Relationship: girlfriend for the last 23 yrs   Children: 2 children that live out of state   Supports: girlfriend is main support   Housing: lives with girlfriend   Abuse: denies    Family History:   Family History   Problem Relation Age of Onset    Cancer Other     High Blood Pressure Other     Stroke Other     Psychiatric: denies    Allergies: Allergies   Allergen Reactions    Shellfish Allergy     Augmentin [Amoxicillin-Pot Clavulanate] Nausea And Vomiting       Home Medications:   No current facility-administered medications on file prior to encounter. Current Outpatient Medications on File Prior to Encounter   Medication Sig Dispense Refill    ibuprofen (ADVIL;MOTRIN) 400 MG tablet Take 400 mg by mouth every 6 hours as needed for Pain      citalopram (CELEXA) 40 MG tablet Take 1 tablet by mouth daily 90 tablet 0    doxazosin (CARDURA) 2 MG tablet Take 1 tablet by mouth daily 90 tablet 0    atorvastatin (LIPITOR) 10 MG tablet Take 0.5 tablets by mouth daily 90 tablet 0    finasteride (PROSCAR) 5 MG tablet Take 1 tablet by mouth daily 90 tablet 0    tamsulosin (FLOMAX) 0.4 MG capsule Take 1 capsule by mouth daily 90 capsule 0    gabapentin (NEURONTIN) 300 MG capsule Take 1 capsule by mouth 2 times daily for 90 days.  180 capsule 0    metoprolol tartrate (LOPRESSOR) 25 MG tablet Take 0.5 tablets by mouth 2 times daily 90 tablet 0    brexpiprazole (REXULTI) 2 MG TABS tablet Take 1 tablet by mouth daily 90 tablet 0    omeprazole (PRILOSEC) 40 MG delayed release capsule Take 1 capsule by mouth every morning (before breakfast) 30 capsule 1    butalbital-acetaminophen-caffeine (FIORICET, ESGIC) -40 MG per tablet Take 1 tablet by mouth every 6 hours as needed for Headaches 90 tablet 0    fluticasone (FLONASE) 50 MCG/ACT nasal spray 1 spray by Each Nostril route daily (Patient not taking: Reported on 7/4/2022) 16 g 3    ipratropium (ATROVENT) 0.03 % nasal spray 2 sprays by Each Nostril route daily (Patient not taking: Reported on 7/4/2022) 30 mL 3    rivaroxaban (XARELTO) 20 MG TABS tablet Take 1 tablet by mouth daily (with breakfast) 90 tablet 1       Medications:  Scheduled Meds:   atorvastatin  5 mg Oral Nightly    brexpiprazole  3 mg Oral Daily    citalopram  40 mg Oral Daily    doxazosin  2 mg Oral Daily    finasteride  5 mg Oral Daily    gabapentin  300 mg Oral BID    metoprolol tartrate  12.5 mg Oral BID    pantoprazole  40 mg Oral QAM AC    rivaroxaban  20 mg Oral Daily with breakfast    tamsulosin  0.4 mg Oral Daily     PRN Meds:.butalbital-acetaminophen-caffeine, ibuprofen, LORazepam, HYDROcodone-acetaminophen, ondansetron    OBJECTIVE:  .  Vitals:    07/05/22 0000 07/05/22 0730 07/05/22 1138 07/05/22 1452   BP: 108/62 130/76 123/75    Pulse: 77 76 73    Resp: 16 16 16    Temp: 98.5 °F (36.9 °C) 98 °F (36.7 °C) 98 °F (36.7 °C)    TempSrc: Oral Oral Oral    SpO2: 97% 94% 96%    Weight:       Height:    5' 9\" (1.753 m)       MSE:   Appearance    alert, cooperative  Motor: Normal strength and tone, No abnormal movements, tics or mannerisms.   Speech    spontaneous, normal rate and normal volume  Language    0 - no aphasia, normal  Mood/Affect    Anxious / congruent to mood, constricted  Thought Process    linear and goal directed  Thought Content    intact , no delusions, no suicidal ideation  Associations    logical connections  Attention/Concentration    intact  Orientation    oriented to person, place, time, and general circumstances  Memory    recent and remote memory intact  Fund of Knowledge    intact  Insight/Judgement Fair / Intact    Labs:   Recent Labs     07/04/22  1113   WBC 9.9   HGB 15.0   HCT 43.9   MCV 90.3        Recent Labs     07/04/22  1113      K 4.5      CO2 28   BUN 18     Recent Labs     07/04/22  1113   AST 13*   ALT 13      Lab Results   Component Value Date/Time    COLORU Yellow 07/04/2022 11:13 AM    NITRU Negative 07/04/2022 11:13 AM    GLUCOSEU Negative 07/04/2022 11:13 AM    KETUA Negative 07/04/2022 11:13 AM    UROBILINOGEN 0.2 07/04/2022 11:13 AM    BILIRUBINUR Negative 07/04/2022 11:13 AM     No results found for: LABA1C  No results found for: EAG  Lab Results   Component Value Date    CHOL 165 03/15/2022    CHOL 160 08/08/2018    CHOL 173 11/02/2017     Lab Results   Component Value Date    TRIG 155 (H) 03/15/2022    TRIG 224 (H) 08/08/2018    TRIG 202 (H) 11/02/2017     Lab Results   Component Value Date    HDL 37 (L) 03/15/2022    HDL 38 (L) 08/08/2018    HDL 38 (L) 11/02/2017     Lab Results   Component Value Date    LDLCALC 97 03/15/2022    LDLCALC 77 08/08/2018    LDLCALC 95 11/02/2017     Lab Results   Component Value Date    LABVLDL 31 03/15/2022    LABVLDL 40 11/02/2017    LABVLDL 42 06/03/2014     Lab Results   Component Value Date    CHOLHDLRATIO 3.8 11/18/2016    CHOLHDLRATIO 4.4 (H) 11/18/2015     No results found for: TSH, W9LCSFS, X8QSCWD, THYROIDAB  No results found for: LXKDVUF3K7  No results found for: ZPAESJVK34  No results found for: FOLATE    Last Drug screen: no UDS on file    Imaging:   CT Head 7/4/2022:      FINDINGS:   BRAIN/VENTRICLES: No mass effect or midline shift.  No hydrocephalus.  Mild   periventricular white matter hypodensity is seen bilaterally.  No gross acute   hemorrhage. 2       ORBITS: Postoperative changes of the globes.       SINUSES: The visualized paranasal sinuses and mastoid air cells demonstrate   no acute abnormality.       SOFT TISSUES/SKULL:  No acute abnormality of the visualized skull or soft   tissues.           Impression   Mild small vessel ischemic disease.       RECOMMENDATIONS:   Unavailable             EK2022: rate 75 bpm, Sinus rhythm with marked sinus arrhythmia with junctional escape complexes, QTc 426ms      Kahlil Lyman MD   Psychiatrist

## 2022-07-05 NOTE — FLOWSHEET NOTE
Shift assessment completed as charted patient is up ad shira in room. He presents to the ED complaining of being sent here for anxiety according to his primary care physician. C/O suprapubic abdominal discomfort with some urinary urgency and nausea but no vomiting diarrhea or constipation. He is alert and oriented x 4 calm pleasant on room air lungs clear takes pills whole with water no skin issues  Vital signs stable afebrile  Blood pressure running on lower side,  On metoprolol 12.5mg See MAR Continent of bowel and bladder denies dysuria, constipation, or diarrhea at this time appetite fair. Call light within reach Bed in low position Will continue to monitor.

## 2022-07-05 NOTE — PLAN OF CARE
Problem: Pain  Goal: Verbalizes/displays adequate comfort level or baseline comfort level  Outcome: Progressing     Problem: Safety - Adult  Goal: Free from fall injury  Outcome: Progressing  Flowsheets (Taken 7/5/2022 0159)  Free From Fall Injury:   Instruct family/caregiver on patient safety   Based on caregiver fall risk screen, instruct family/caregiver to ask for assistance with transferring infant if caregiver noted to have fall risk factors     Problem: ABCDS Injury Assessment  Goal: Absence of physical injury  Outcome: Progressing  Flowsheets (Taken 7/5/2022 0159)  Absence of Physical Injury: Implement safety measures based on patient assessment

## 2022-07-05 NOTE — PROGRESS NOTES
Discharge packet gone over with patient. Pt. Knows to  4 prescriptions from [facility]. Will get them this evening. Ride is on their way to come get him.

## 2022-07-05 NOTE — PLAN OF CARE
Problem: Pain  Goal: Verbalizes/displays adequate comfort level or baseline comfort level  7/5/2022 0921 by Celestine Arenas RN  Outcome: Progressing  Flowsheets (Taken 7/5/2022 0730)  Verbalizes/displays adequate comfort level or baseline comfort level: Encourage patient to monitor pain and request assistance  7/5/2022 0200 by Ronald Santos RN  Outcome: Progressing  Flowsheets  Taken 7/5/2022 0000  Verbalizes/displays adequate comfort level or baseline comfort level:   Encourage patient to monitor pain and request assistance   Assess pain using appropriate pain scale   Administer analgesics based on type and severity of pain and evaluate response   Implement non-pharmacological measures as appropriate and evaluate response   Consider cultural and social influences on pain and pain management   Notify Licensed Independent Practitioner if interventions unsuccessful or patient reports new pain  Taken 7/4/2022 2015  Verbalizes/displays adequate comfort level or baseline comfort level:   Encourage patient to monitor pain and request assistance   Assess pain using appropriate pain scale   Administer analgesics based on type and severity of pain and evaluate response   Implement non-pharmacological measures as appropriate and evaluate response   Consider cultural and social influences on pain and pain management   Notify Licensed Independent Practitioner if interventions unsuccessful or patient reports new pain     Problem: Safety - Adult  Goal: Free from fall injury  7/5/2022 0921 by Celestine Arenas RN  Outcome: Progressing  7/5/2022 0200 by Ronald Santos RN  Outcome: Progressing  Flowsheets (Taken 7/5/2022 0159)  Free From Fall Injury:   Instruct family/caregiver on patient safety   Based on caregiver fall risk screen, instruct family/caregiver to ask for assistance with transferring infant if caregiver noted to have fall risk factors     Problem: ABCDS Injury Assessment  Goal: Absence of physical injury  7/5/2022 5072 by Agapito Zelaya RN  Outcome: Progressing  7/5/2022 0200 by Virgil Patton RN  Outcome: Progressing  Flowsheets (Taken 7/5/2022 0159)  Absence of Physical Injury: Implement safety measures based on patient assessment     Pt. Resting in bed at this time. VSS. Denies pain. Took meds well this AM. Ate breakfast. Knows about waiting on MD's before he can discharge. Hoping to go today if possible. Feeling better. Will monitor. Call light in reach.

## 2022-07-06 LAB
EKG ATRIAL RATE: 75 BPM
EKG DIAGNOSIS: NORMAL
EKG P AXIS: 78 DEGREES
EKG P-R INTERVAL: 176 MS
EKG Q-T INTERVAL: 382 MS
EKG QRS DURATION: 92 MS
EKG QTC CALCULATION (BAZETT): 426 MS
EKG R AXIS: 13 DEGREES
EKG T AXIS: 39 DEGREES
EKG VENTRICULAR RATE: 75 BPM
L. PNEUMOPHILA SEROGP 1 UR AG: NORMAL

## 2022-07-06 PROCEDURE — 93010 ELECTROCARDIOGRAM REPORT: CPT | Performed by: INTERNAL MEDICINE

## 2022-07-21 ENCOUNTER — OFFICE VISIT (OUTPATIENT)
Dept: PULMONOLOGY | Age: 77
End: 2022-07-21
Payer: MEDICARE

## 2022-07-21 VITALS
DIASTOLIC BLOOD PRESSURE: 56 MMHG | HEIGHT: 69 IN | BODY MASS INDEX: 25.92 KG/M2 | WEIGHT: 175 LBS | HEART RATE: 81 BPM | OXYGEN SATURATION: 94 % | SYSTOLIC BLOOD PRESSURE: 102 MMHG

## 2022-07-21 DIAGNOSIS — R91.1 LUNG NODULE: Primary | ICD-10-CM

## 2022-07-21 DIAGNOSIS — G47.33 OBSTRUCTIVE SLEEP APNEA (ADULT) (PEDIATRIC): ICD-10-CM

## 2022-07-21 PROCEDURE — 1123F ACP DISCUSS/DSCN MKR DOCD: CPT | Performed by: INTERNAL MEDICINE

## 2022-07-21 PROCEDURE — 99214 OFFICE O/P EST MOD 30 MIN: CPT | Performed by: INTERNAL MEDICINE

## 2022-07-21 PROCEDURE — 99407 BEHAV CHNG SMOKING > 10 MIN: CPT | Performed by: INTERNAL MEDICINE

## 2022-07-21 NOTE — PROGRESS NOTES
PULMONARY OFFICE NEW PATIENT VISIT    CONSULTING PHYSICIAN:  Courtney Castro MD     REASON FOR VISIT:   Chief Complaint   Patient presents with    Abnormal CT         DATE OF VISIT: 7/21/2022    HISTORY OF PRESENT ILLNESS: 77y.o. year old male comes in with pulmonary/sleep clinic for a follow-up. Patient reports that he could not use the BiPAP therapy despite trying different masks and following return the machine. Currently able to sleep for 8 to 10 hours during daytime. Continues to have snoring. Weight remains stable. Had a repeat CT chest done in June of this year which showed that the left lung pulm nodule has grown a little bit. He continues to smoke about half pack of cigarettes daily. No anorexia or weight loss. No hemoptysis no fevers. Patient remains depressed as well has intermittent anxiety. Previously: Patient reports that since September 2021 he has not been able to use the BiPAP therapy. He describes this noncompliance to various reasons including persistent sinus drainage, heightened anxiety as well as mask interface moving on his face causing mask leakage. Patient reports that in the winter months he has had increased postnasal drip as well as rhinorrhea. This leads to filling up of his mask interface with nasal secretions. He has been taking only over-the-counter antiallergy medications. Has not tried any nasal sprays. Weight has been stable. Patient's anxiety has increased along with depression. He has recently been started on Zoloft. Also reports that in the morning his mask interface moves on his face this leads to mask leakage. : Patient reports that his sleep quality has improved and he is sleeping more. Wakes up refreshed. Denies any daytime sleepiness. Reports improved tolerance for the pressure setting on the BiPAP. Mask is still leaking but is not bothering him that much. Weight is stable.   Continues to report both anxiety as well as depression due to various family issues. Patient reports that he has been trying to use his BiPAP therapy regularly as he feels benefited from it. He is bothered by the mask leakage as it disturbs his sleep. Also feels his pressure setting is a little too high. Has been very stressed out due to family losses and health related issues. Has been smoking 1 pack/day has been trying to quit. Patient reports that he has been having poor quality sleep for quite some time now. He has had several stressors in his life including his family member losses. He also had urological issues which required procedures. He continues to report snoring, gasping, choking at nighttime. He wakes up at least twice or thrice previously. During the Daytime He Feels Tired and Exhausted. Weight Has Been Stable. Denies Any A.M. Dry Mouth or A.M. Headaches. Sleep Medicine 2/16/2022 8/18/2021 1/8/2021 11/5/2020   Sitting and reading 0 0 1 0   Watching TV 0 0 1 0   Sitting, inactive in a public place (e.g. a theatre or a meeting) 0 0 1 0   As a passenger in a car for an hour without a break 0 0 0 0   Lying down to rest in the afternoon when circumstances permit 2 2 2 2   Sitting and talking to someone 0 0 0 0   Sitting quietly after a lunch without alcohol 0 0 1 1   In a car, while stopped for a few minutes in traffic 0 0 0 0   Total score 2 2 6 3   Neck circumference (Inches) - - 16.5 -            REVIEW OF SYSTEMS:   Negative except that mentioned in the HPI.     PAST MEDICAL HISTORY:   Past Medical History:   Diagnosis Date    A-fib [facility])     Hypercholesteremia     Hypertension        PAST SURGICAL HISTORY:   Past Surgical History:   Procedure Laterality Date    COLONOSCOPY  05/24/2021    HEMORRHOID SURGERY      UROLIFT    SHOULDER SURGERY Right     RTC        SOCIAL HISTORY:   Social History     Tobacco Use    Smoking status: Every Day     Packs/day: 0.50     Years: 50.00     Pack years: 25.00     Types: Cigarettes    Smokeless tobacco: Former Quit date: 1/1/2011   Vaping Use    Vaping Use: Never used   Substance Use Topics    Alcohol use: No    Drug use: No       FAMILY HISTORY:   Family History   Problem Relation Age of Onset    Cancer Other     High Blood Pressure Other     Stroke Other        MEDICATIONS:     Current Outpatient Medications on File Prior to Visit   Medication Sig Dispense Refill    buprenorphine (BUTRANS) 10 MCG/HR PTWK Place 1 patch onto the skin once a week for 30 days. 4 patch 0    OLANZapine (ZYPREXA) 5 MG tablet Take 1 tablet by mouth nightly 30 tablet 0    ondansetron (ZOFRAN-ODT) 4 MG disintegrating tablet Take 1 tablet by mouth every 8 hours as needed for Nausea or Vomiting 30 tablet 2    clonazePAM (KLONOPIN) 0.5 MG tablet Take 1 tablet by mouth 2 times daily as needed for Anxiety for up to 30 days. 60 tablet 0    citalopram (CELEXA) 40 MG tablet Take 1 tablet by mouth daily 90 tablet 0    doxazosin (CARDURA) 2 MG tablet Take 1 tablet by mouth daily 90 tablet 0    atorvastatin (LIPITOR) 10 MG tablet Take 0.5 tablets by mouth daily 90 tablet 0    finasteride (PROSCAR) 5 MG tablet Take 1 tablet by mouth daily 90 tablet 0    tamsulosin (FLOMAX) 0.4 MG capsule Take 1 capsule by mouth daily 90 capsule 0    gabapentin (NEURONTIN) 300 MG capsule Take 1 capsule by mouth 2 times daily for 90 days.  180 capsule 0    metoprolol tartrate (LOPRESSOR) 25 MG tablet Take 0.5 tablets by mouth 2 times daily 90 tablet 0    omeprazole (PRILOSEC) 40 MG delayed release capsule Take 1 capsule by mouth every morning (before breakfast) 30 capsule 1    brexpiprazole (REXULTI) 3 MG TABS tablet Take 1 tablet by mouth daily 30 tablet 1    ibuprofen (ADVIL;MOTRIN) 400 MG tablet Take 400 mg by mouth every 6 hours as needed for Pain      rivaroxaban (XARELTO) 20 MG TABS tablet Take 1 tablet by mouth daily (with breakfast) 90 tablet 1    butalbital-acetaminophen-caffeine (FIORICET, ESGIC) -40 MG per tablet Take 1 tablet by mouth every 6 hours as needed for Headaches 90 tablet 0    fluticasone (FLONASE) 50 MCG/ACT nasal spray 1 spray by Each Nostril route daily (Patient not taking: Reported on 7/21/2022) 16 g 3    ipratropium (ATROVENT) 0.03 % nasal spray 2 sprays by Each Nostril route daily (Patient not taking: Reported on 7/21/2022) 30 mL 3     No current facility-administered medications on file prior to visit. ALLERGIES:   Allergies as of 07/21/2022 - Fully Reviewed 07/21/2022   Allergen Reaction Noted    Shellfish allergy  08/09/2011    Augmentin [amoxicillin-pot clavulanate] Nausea And Vomiting 01/21/2020      OBJECTIVE:   height is 5' 9\" (1.753 m) and weight is 175 lb (79.4 kg). His blood pressure is 102/56 (abnormal) and his pulse is 81. His oxygen saturation is 94%. PHYSICAL EXAM:    CONSTITUTIONAL: He is a 77y.o.-year-old who appears his stated age. He is alert and oriented x 3 and in no acute distress. HEENT: PERRLA, EOMI. No scleral icterus. No thrush, atraumatic, normocephalic. Mallampati class I airway. NECK: Supple, without cervical or supraclavicular lymphadenopathy:  CARDIOVASCULAR: S1 S2 RRR. Without murmer  RESPIRATORY & CHEST: Lungs are clear to auscultation and percussion. No wheezing, no crackles. Good air movement  GASTROINTESTINAL & ABDOMEN: Soft, nontender, positive bowel sounds in all quadrants, non-distended, without hepatosplenomegaly. GENITOURINARY: Deferred. MUSCULOSKELETAL: No tenderness to palpation of the axial skeleton. There is no clubbing. No cyanosis. No edema of the lower extremities. SKIN OF BODY: No rash or jaundice. PSYCHIATRIC EVALUATION: Normal affect. Patient answers questions appropriately. HEMATOLOGIC/LYMPHATIC/ IMMUNOLOGIC: No palpable lymphadenopathy. NEUROLOGIC: Alert and oriented x 3. Groslly non-focal. Motor strength is 5+/5 in all muscle groups. The patient has a normal sensorium globally.       LABS:    Lab Summary Latest Ref Rng & Units 7/5/2022 7/4/2022 6/20/2022 WBC 4.0 - 11.0 K/uL - 9.9 -   Hgb 13.5 - 17.5 g/dL - 15.0 -   Hct 40.5 - 52.5 % - 43.9 -   Platelets 823 - 055 K/uL - 200 -   Sodium 136 - 145 mmol/L - 143 137   Potassium 3.5 - 5.1 mmol/L - 4.5 4.5   BUN 7 - 20 mg/dL - 18 18   Creatinine 0.8 - 1.3 mg/dL - 0.9 0.9   Glucose 70 - 99 mg/dL - 113(H) 100(H)   Calcium 8.3 - 10.6 mg/dL - 10.0 9.3   Alk Phos 40 - 129 U/L - 60 -   Albumin 3.4 - 5.0 g/dL - 4.7 -   Bilirubin 0.0 - 1.0 mg/dL - 1. 5(H) -   AST 15 - 37 U/L - 13(L) -   ALT 10 - 40 U/L - 13 -   HDL cholesterol 40 - 60 mg/dL - - -   Triglycerides 0 - 150 mg/dL - - -   LDL calc <100 mg/dL - - -   VLDL calc Not Established mg/dL - - -   TSH 0.27 - 4.20 uIU/mL 1.00 - -   Some recent data might be hidden         IMAGING:    Narrative   EXAMINATION:   CT OF THE CHEST WITH CONTRAST 6/20/2022 5:49 pm       FINDINGS:   Mediastinum: Calcifications are seen in the thyroid. Hypodense nodule seen   in the thyroid measuring 4 mm in size or less. Small mediastinal and hilar   nodes are noted. No pericardial effusion is seen. Small hiatal hernia seen. There is nonspecific thickening at the GE junction. Small calcified and   noncalcified mediastinal nodes are noted. Lungs/pleura: Respiratory motion artifact limits evaluation of fine pulmonary   parenchymal change. Mild underlying emphysema is seen. Mosaic attenuation   is seen throughout the lungs, suggesting small airways disease or air   trapping. There is mild fusiform bronchiectasis. Scarring is seen in the   lingula. Scarring is seen in the right middle lobe. 9 mm by 8 mm nodule is seen in the left upper lobe. .  This is increased in   size compared to 2017       Upper Abdomen: Right adrenal gland is normal.  Hypodense nodule seen in left   adrenal gland measuring 1.5 cm, compatible with adenoma. Small   calcifications seen in the left adrenal gland. .  This is unchanged there is   mild splenomegaly       Stones are seen in the right kidney measuring 4 mm in size       Small lymph nodes are seen in the right periaortic region, incompletely   evaluated,, probably unchanged compared to prior chest CT       Soft Tissues/Bones: High spurring is seen in the spine. Spurring is seen in   the shoulder joints. Small axillary nodes are seen, measuring up to 1.4 cm x   10 mm on the right., probably unchanged compared to prior chest CT. Impression   9 mm nodular density is seen in the left upper lobe. This is suspicious for   early lung carcinoma given that it appears increased in size compared to   2017. Milo Fitzpatrick Consider PET-CT       Nonobstructing right renal stone         No new chest imaging for me to review. Pulmonary Functions Testing Results:    Baseline polysomnography done on 1/19/2021  Sleep efficiency: 79.3  Overall AHI: 31.5  REM AHI: 60  Lowest oxygen saturation: 77%  Time spent below an oxygen saturation 90%: 1.6 minutes      CPAP titration done on 2/22/2021  Type of therapy: Auto BiPAP with EPAP min: 13, pressure support range of 4-8 cmH2O with IPAP max of 25 cmH2O. With medium DreamWear full facemask. CPAP compliance summary     5/15/2021-8/12/2021 3/5/2021-5/16/2021   Days with device usage  84/90 days  67/73 days   Average Usage  6 hours 9 minutes  5 hours 38 minutes 10 seconds   Days with device usage >4hrs  87.8%  79.5%   Large Leak per night  4 hours 16 minutes  3 hours 46 minutes   AHI  5.1  8.1   CPAP settings  auto BiPAP  auto BiPAP   90th percentile pressure  IPAP: 15, EPAP: 10  IPAP: 17, EPAP: 13   Mask  fullface mask  full facemask     DME: [facility]      IMPRESSION AND RECOMMENDATIONS:     1. GORDON (obstructive sleep apnea)  -Patient has severe obstructive sleep apnea which is amenable to BiPAP therapy.  -He has been unable to use the BiPAP therapy since September 2021.  -I advised him that without treating sleep apnea he will expose himself to having chronic health issues.  -He is willing to give it another try.   I will order for repeat sleep studies. 2. Essential hypertension  -Currently patient on Zoloft and Neurontin. 3. Body mass index 28.0-28.9, adult  -I strongly advised the patient to make efforts to lose weight. I discussed various modalities including moderate intensity intermittent exercises, diet control and bariatric surgery. If the patient loses even 10 to 15% of current body weight, it will be beneficial in improving the overall health. 4.  Nicotine dependence  -Patient counselled on the dangers of tobacco use and urged to quit. Also discussed the importance of a supportive environment and helped identify them. Discussed possibility of various Nicotine replacement therapies if experiencing prolonged craving or withdrawal symptoms. Discussed the possibility of negative mood or depression after quitting. Reassured that some weight gain after smoking is common and dietary, exercise, or lifestyle changes will be able to control it. Time spent counseling was >10mins. 5. Pulmonary nodule  -Patient has a lingular nodule which has been present since 2017. In 2017 it was 8 mm in size and now has become 9 mm in size. -Due to his long history of smoking, risk of malignancy is present.  -I will get a PET CT scan in order to assess if this pulm nodule is active. Return in about 6 weeks (around 9/1/2022). Sonal Rodriguez MD  Pulmonary Critical Care and Sleep Medicine  7/21/2022, 4:11 PM    This note was completed using dragon medical speech recognition software. Grammatical errors, random word insertions, pronoun errors and incomplete sentences are occasional consequences of this technology due to software limitations. If there are questions or concerns about the content of this note of information contained within the body of this dictation they should be addressed with the provider for clarification.

## 2022-07-22 ENCOUNTER — TELEPHONE (OUTPATIENT)
Dept: SLEEP CENTER | Age: 77
End: 2022-07-22

## 2022-07-22 NOTE — TELEPHONE ENCOUNTER
Called to schedule a split night sleep study per Mckayla merida for the pt to return my call     Nora

## 2022-07-25 NOTE — TELEPHONE ENCOUNTER
Returned pt's call (wife Elisa Domínguez left vm)   Left them a  for a phone call back to schedule sleep study.

## 2022-07-26 RX ORDER — RIVAROXABAN 20 MG/1
TABLET, FILM COATED ORAL
Qty: 30 TABLET | Refills: 5 | Status: SHIPPED | OUTPATIENT
Start: 2022-07-26

## 2022-07-26 NOTE — TELEPHONE ENCOUNTER
Last OV: 10.11.21 NPSR  Last Labs: 07.04.22   Next appt: none at this time and nothing on the recall list.

## 2022-08-01 ENCOUNTER — HOSPITAL ENCOUNTER (OUTPATIENT)
Dept: PET IMAGING | Age: 77
Discharge: HOME | End: 2022-08-01
Payer: MEDICARE

## 2022-08-01 DIAGNOSIS — R91.1 LUNG NODULE: ICD-10-CM

## 2022-08-01 PROCEDURE — A9552 F18 FDG: HCPCS | Performed by: INTERNAL MEDICINE

## 2022-08-01 PROCEDURE — G1010 CDSM STANSON: HCPCS

## 2022-08-01 PROCEDURE — 3430000000 HC RX DIAGNOSTIC RADIOPHARMACEUTICAL: Performed by: INTERNAL MEDICINE

## 2022-08-01 RX ORDER — FLUDEOXYGLUCOSE F 18 200 MCI/ML
13.65 INJECTION, SOLUTION INTRAVENOUS
Status: COMPLETED | OUTPATIENT
Start: 2022-08-01 | End: 2022-08-01

## 2022-08-01 RX ADMIN — FLUDEOXYGLUCOSE F 18 13.65 MILLICURIE: 200 INJECTION, SOLUTION INTRAVENOUS at 10:02

## 2022-08-11 NOTE — DISCHARGE SUMMARY
[facility]                     [facility], [address]                               DISCHARGE SUMMARY    PATIENT NAME: Patrick Olivera                       :        1945  MED REC NO:   [id number]                          ROOM:       2082  ACCOUNT NO:   [de-identified]                           ADMIT DATE: 2022  PROVIDER:     Courtney Castro MD                  DISCHARGE DATE:  2022    FINAL DIAGNOSES:  1. Severe psychomotor breakdown. 2.  Depression. 3.  Acute change in mental status. 4.  Nervous breakdown. 5.  Anxiety neurosis. 6.  Severe major depression. 7.  Pulmonary nodule suspicious for bronchogenic carcinoma. DISCHARGE MEDICATIONS:  1. Norco 7.5/325 one every 4 hours p.r.n.  2.  Rexulti 3 mg nightly. 3.  Zofran orally disintegrating tablet 4 mg every 8 hours p.r.n.  4.  Klonopin 0.5 mg p.o. b.i.d. as needed for anxiety. 5.  Ibuprofen 400 mg every 6 hours p.r.n.  6.  Fioricet one every 6 hours p.r.n.  7.  Flonase one spray each nostril daily. 8.  Atrovent nasal spray as directed. 9.  Xarelto 20 mg once a day. HOSPITAL COURSE:  This 43-year-old white man with intractable back pain,  known tobacco abuse, COPD, and known case of depression came to the  emergency room with total inability to function, change in mental  status. His girlfriend who he lived with felt very concerned. The  patient also had slight disorientation. Vital signs were stable. Temperature 98.4, blood pressure 123/55, respirations 16, heart rate 84,  O2 sat 96% on room air. Lungs were showing prolonged exhalation with  few wheezing. The patient had sodium of 143, potassium 4.5, chloride  107, CO2 28, BUN 18, creatinine 0.9, calcium 10.0. ProBNP level 124. Troponin less than 0.01. Liver panel within normal limit. White blood  cell count 9.9, hemoglobin and hematocrit 15 and 43.9, platelet count  was 400.   CT scan of the head without contrast and CT scan of the  abdomen, the patient has mild small vessel ischemic disease on CT of the  head. CT of the abdomen and pelvis shows non-obstructing right-sided  nephrolithiasis with persistent retroperitoneal and iliac chain  adenopathy, which could be reactive or neoplastic with a small hiatal  hernia. A recent CT of the chest, which is being evaluated by the  pulmonologist shows 9-mm nodular density in the left upper lobe, which  is suspicious for early lung carcinoma given that it appears increased  in size. A PET/CT has been recommended in this regard. The patient has  already seen the pulmonologist.  The patient also had psychiatric  consultation who made necessary arrangement. The patient was not  suicidal by any means. The patient's general condition improved. His  thought process improved and his mental status improved after. Dr. Moose Castañeda from pulmonary service will follow up with the  patient as an outpatient for the lung neoplasm. Dr. Edu Sevilla saw  the patient from psych standpoint who made his therapeutic  recommendation with SSRI. He increased the dose of Rexulti to 3 mg,  BuSpar also was given. The patient was also continued on citalopram 40  mg, QTc was reviewed and within normal limit. Outpatient psychiatric  option would really be helpful including checking TSH, B12 and folate  level. The patient was discharged in stable condition. According to  girlfriend, the patient was more back to his baseline.         Jaswant Almanza MD    D: 08/10/2022 22:37:44       T: 08/11/2022 14:01:03     [email or web address]  Job#: [id number]     Doc#: [id number]    CC:

## 2022-08-15 ENCOUNTER — OFFICE VISIT (OUTPATIENT)
Dept: PULMONOLOGY | Age: 77
End: 2022-08-15
Payer: MEDICARE

## 2022-08-15 VITALS
HEART RATE: 68 BPM | WEIGHT: 174 LBS | DIASTOLIC BLOOD PRESSURE: 70 MMHG | SYSTOLIC BLOOD PRESSURE: 122 MMHG | HEIGHT: 69 IN | BODY MASS INDEX: 25.77 KG/M2 | OXYGEN SATURATION: 97 %

## 2022-08-15 DIAGNOSIS — R91.1 LUNG NODULE: Primary | ICD-10-CM

## 2022-08-15 PROCEDURE — 99214 OFFICE O/P EST MOD 30 MIN: CPT | Performed by: INTERNAL MEDICINE

## 2022-08-15 PROCEDURE — 1123F ACP DISCUSS/DSCN MKR DOCD: CPT | Performed by: INTERNAL MEDICINE

## 2022-08-15 NOTE — PROGRESS NOTES
PULMONARY OFFICE NEW PATIENT VISIT    CONSULTING PHYSICIAN:  Shelbi Cuevas MD     REASON FOR VISIT:   Chief Complaint   Patient presents with    Results     PET scan           DATE OF VISIT: 8/15/2022    HISTORY OF PRESENT ILLNESS: 77y.o. year old male comes in with pulmonary/sleep clinic for a follow-up. No new symptoms since last clinic visit. Did undergo a PET CT scan and wants to discuss the results. Continues to smoke about 5 to 6 cigarettes daily. Smokes more when he is under a lot of physical or emotional stress. Stays motivated to quit. Previously: Patient reports that he could not use the BiPAP therapy despite trying different masks and following return the machine. Currently able to sleep for 8 to 10 hours during daytime. Continues to have snoring. Weight remains stable. Had a repeat CT chest done in June of this year which showed that the left lung pulm nodule has grown a little bit. He continues to smoke about half pack of cigarettes daily. No anorexia or weight loss. No hemoptysis no fevers. Patient remains depressed as well has intermittent anxiety. Patient reports that since September 2021 he has not been able to use the BiPAP therapy. He describes this noncompliance to various reasons including persistent sinus drainage, heightened anxiety as well as mask interface moving on his face causing mask leakage. Patient reports that in the winter months he has had increased postnasal drip as well as rhinorrhea. This leads to filling up of his mask interface with nasal secretions. He has been taking only over-the-counter antiallergy medications. Has not tried any nasal sprays. Weight has been stable. Patient's anxiety has increased along with depression. He has recently been started on Zoloft. Also reports that in the morning his mask interface moves on his face this leads to mask leakage. : Patient reports that his sleep quality has improved and he is sleeping more. Wakes up refreshed. Denies any daytime sleepiness. Reports improved tolerance for the pressure setting on the BiPAP. Mask is still leaking but is not bothering him that much. Weight is stable. Continues to report both anxiety as well as depression due to various family issues. Patient reports that he has been trying to use his BiPAP therapy regularly as he feels benefited from it. He is bothered by the mask leakage as it disturbs his sleep. Also feels his pressure setting is a little too high. Has been very stressed out due to family losses and health related issues. Has been smoking 1 pack/day has been trying to quit. Patient reports that he has been having poor quality sleep for quite some time now. He has had several stressors in his life including his family member losses. He also had urological issues which required procedures. He continues to report snoring, gasping, choking at nighttime. He wakes up at least twice or thrice previously. During the Daytime He Feels Tired and Exhausted. Weight Has Been Stable. Denies Any A.M. Dry Mouth or A.M. Headaches. Sleep Medicine 2/16/2022 8/18/2021 1/8/2021 11/5/2020   Sitting and reading 0 0 1 0   Watching TV 0 0 1 0   Sitting, inactive in a public place (e.g. a theatre or a meeting) 0 0 1 0   As a passenger in a car for an hour without a break 0 0 0 0   Lying down to rest in the afternoon when circumstances permit 2 2 2 2   Sitting and talking to someone 0 0 0 0   Sitting quietly after a lunch without alcohol 0 0 1 1   In a car, while stopped for a few minutes in traffic 0 0 0 0   Total score 2 2 6 3   Neck circumference (Inches) - - 16.5 -            REVIEW OF SYSTEMS:   Negative except that mentioned in the HPI.     PAST MEDICAL HISTORY:   Past Medical History:   Diagnosis Date    A-fib [facility])     Hypercholesteremia     Hypertension        PAST SURGICAL HISTORY:   Past Surgical History:   Procedure Laterality Date    COLONOSCOPY  05/24/2021 HEMORRHOID SURGERY      UROLIFT    SHOULDER SURGERY Right     RTC        SOCIAL HISTORY:   Social History     Tobacco Use    Smoking status: Every Day     Packs/day: 0.50     Years: 50.00     Pack years: 25.00     Types: Cigarettes    Smokeless tobacco: Former     Quit date: 1/1/2011   Vaping Use    Vaping Use: Never used   Substance Use Topics    Alcohol use: No    Drug use: No       FAMILY HISTORY:   Family History   Problem Relation Age of Onset    Cancer Other     High Blood Pressure Other     Stroke Other        MEDICATIONS:     Current Outpatient Medications on File Prior to Visit   Medication Sig Dispense Refill    XARELTO 20 MG TABS tablet TAKE ONE TABLET BY MOUTH DAILY WITH BREAKFAST 30 tablet 5    ondansetron (ZOFRAN-ODT) 4 MG disintegrating tablet Take 1 tablet by mouth every 8 hours as needed for Nausea or Vomiting 30 tablet 2    citalopram (CELEXA) 40 MG tablet Take 1 tablet by mouth daily 90 tablet 0    doxazosin (CARDURA) 2 MG tablet Take 1 tablet by mouth daily 90 tablet 0    atorvastatin (LIPITOR) 10 MG tablet Take 0.5 tablets by mouth daily 90 tablet 0    finasteride (PROSCAR) 5 MG tablet Take 1 tablet by mouth daily 90 tablet 0    tamsulosin (FLOMAX) 0.4 MG capsule Take 1 capsule by mouth daily 90 capsule 0    gabapentin (NEURONTIN) 300 MG capsule Take 1 capsule by mouth 2 times daily for 90 days.  180 capsule 0    metoprolol tartrate (LOPRESSOR) 25 MG tablet Take 0.5 tablets by mouth 2 times daily 90 tablet 0    brexpiprazole (REXULTI) 3 MG TABS tablet Take 1 tablet by mouth daily 30 tablet 1    ibuprofen (ADVIL;MOTRIN) 400 MG tablet Take 400 mg by mouth every 6 hours as needed for Pain      fluticasone (FLONASE) 50 MCG/ACT nasal spray 1 spray by Each Nostril route daily 16 g 3    ipratropium (ATROVENT) 0.03 % nasal spray 2 sprays by Each Nostril route daily 30 mL 3    OLANZapine (ZYPREXA) 5 MG tablet Take 1 tablet by mouth nightly 30 tablet 0    clonazePAM (KLONOPIN) 0.5 MG tablet Take 1 tablet by mouth 2 times daily as needed for Anxiety for up to 30 days. 60 tablet 0    omeprazole (PRILOSEC) 40 MG delayed release capsule Take 1 capsule by mouth every morning (before breakfast) 30 capsule 1    butalbital-acetaminophen-caffeine (FIORICET, ESGIC) -40 MG per tablet Take 1 tablet by mouth every 6 hours as needed for Headaches 90 tablet 0     No current facility-administered medications on file prior to visit. ALLERGIES:   Allergies as of 08/15/2022 - Fully Reviewed 08/15/2022   Allergen Reaction Noted    Shellfish allergy  08/09/2011    Augmentin [amoxicillin-pot clavulanate] Nausea And Vomiting 01/21/2020      OBJECTIVE:   height is 5' 9\" (1.753 m) and weight is 174 lb (78.9 kg). His blood pressure is 122/70 and his pulse is 68. His oxygen saturation is 97%. PHYSICAL EXAM:    CONSTITUTIONAL: He is a 77y.o.-year-old who appears his stated age. He is alert and oriented x 3 and in no acute distress. HEENT: PERRLA, EOMI. No scleral icterus. No thrush, atraumatic, normocephalic. Mallampati class I airway. NECK: Supple, without cervical or supraclavicular lymphadenopathy:  CARDIOVASCULAR: S1 S2 RRR. Without murmer  RESPIRATORY & CHEST: Lungs are clear to auscultation and percussion. No wheezing, no crackles. Good air movement  GASTROINTESTINAL & ABDOMEN: Soft, nontender, positive bowel sounds in all quadrants, non-distended, without hepatosplenomegaly. GENITOURINARY: Deferred. MUSCULOSKELETAL: No tenderness to palpation of the axial skeleton. There is no clubbing. No cyanosis. No edema of the lower extremities. SKIN OF BODY: No rash or jaundice. PSYCHIATRIC EVALUATION: Normal affect. Patient answers questions appropriately. HEMATOLOGIC/LYMPHATIC/ IMMUNOLOGIC: No palpable lymphadenopathy. NEUROLOGIC: Alert and oriented x 3. Groslly non-focal. Motor strength is 5+/5 in all muscle groups. The patient has a normal sensorium globally.       LABS:    Lab Summary Latest Ref Rng & Units 7/5/2022 7/4/2022 6/20/2022   WBC 4.0 - 11.0 K/uL - 9.9 -   Hgb 13.5 - 17.5 g/dL - 15.0 -   Hct 40.5 - 52.5 % - 43.9 -   Platelets 519 - 117 K/uL - 200 -   Sodium 136 - 145 mmol/L - 143 137   Potassium 3.5 - 5.1 mmol/L - 4.5 4.5   BUN 7 - 20 mg/dL - 18 18   Creatinine 0.8 - 1.3 mg/dL - 0.9 0.9   Glucose 70 - 99 mg/dL - 113(H) 100(H)   Calcium 8.3 - 10.6 mg/dL - 10.0 9.3   Alk Phos 40 - 129 U/L - 60 -   Albumin 3.4 - 5.0 g/dL - 4.7 -   Bilirubin 0.0 - 1.0 mg/dL - 1. 5(H) -   AST 15 - 37 U/L - 13(L) -   ALT 10 - 40 U/L - 13 -   HDL cholesterol 40 - 60 mg/dL - - -   Triglycerides 0 - 150 mg/dL - - -   LDL calc <100 mg/dL - - -   VLDL calc Not Established mg/dL - - -   TSH 0.27 - 4.20 uIU/mL 1.00 - -   Some recent data might be hidden         IMAGING:    Narrative   EXAMINATION:   Skull base to midthigh PET/CT       8/1/2022       FINDINGS:   HEAD/NECK: 1.2 cm nodule is seen within the right parotid gland, incompletely   imaged, SUV maximum 10.1. Bilateral carotid artery calcification. CHEST: Calcification of the thoracic aorta. Coronary artery calcification. Calcified subcarinal, right paratracheal, right hilar lymph nodes, in keeping   with granulomatous disease. Right axillary adenopathy is demonstrated with   low level activity, aggregate SUV maximum 1.3. The largest node measures   approximate 3.0 x 1.5 cm. Shotty left axillary lymph nodes are seen to a   lesser degree. The 9 mm anteromedial left upper lobe pulmonary nodule has SUV maximum 1.1. The nodule has enlarged as compared to the 2017 exam when it measured 6 mm. Scattered linear and ground-glass opacity bilaterally, likely atelectasis. No pneumothorax or pleural effusion. ABDOMEN/PELVIS: Excretion of activity is seen from bilateral kidneys and   activity is seen within the urinary bladder. Bowel activity is seen which   can be physiologically variable.        1.3 cm short axis aortocaval lymph node demonstrates activity similar to   retroperitoneal background. 1.0 cm short axis left external iliac chain node   is also not markedly hypermetabolic, SUV maximum 1.1. Nodularity of the left adrenal gland, partially calcified, not markedly   changed as compared to the 2017 exam.  Right nephrolithiasis. Cholelithiasis. Calcification of the abdominal aorta. BONES/SOFT TISSUE: Sclerotic focus within the right iliac bone, not markedly   FDG avid. Postoperative change of the right shoulder. Impression   The slowly enlarging anteromedial left upper lobe pulmonary nodule   demonstrates low level activity. Leading differential considerations include   enlarging granuloma or given its small size, slow-growing malignancy. Biopsy   could be considered for further characterization given its growth since 2017. Low level activity is seen associated with right greater than left axillary   as well as retroperitoneal and iliac chain adenopathy. The findings can   reflect reactive nodes, low-grade lymphoma, or low-grade/treated metastatic   disease. 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 No new chest imaging for me to review. Pulmonary Functions Testing Results:    Baseline polysomnography done on 1/19/2021  Sleep efficiency: 79.3  Overall AHI: 31.5  REM AHI: 60  Lowest oxygen saturation: 77%  Time spent below an oxygen saturation 90%: 1.6 minutes      CPAP titration done on 2/22/2021  Type of therapy: Auto BiPAP with EPAP min: 13, pressure support range of 4-8 cmH2O with IPAP max of 25 cmH2O. With medium DreamWear full facemask.       CPAP compliance summary     5/15/2021-8/12/2021 3/5/2021-5/16/2021   Days with device usage  84/90 days  67/73 days   Average Usage  6 hours 9 minutes  5 hours 38 minutes 10 seconds   Days with device usage >4hrs  87.8%  79.5%   Large Leak per night  4 hours 16 minutes  3 hours 46 minutes   AHI  5.1  8.1   CPAP settings  auto BiPAP  auto BiPAP   90th percentile pressure  IPAP: 15, EPAP: 10  IPAP: 17, EPAP: 13   Mask  fullface mask  full facemask     DME: [facility]      IMPRESSION AND RECOMMENDATIONS:     1. GORDON (obstructive sleep apnea)  -Patient has severe obstructive sleep apnea which is amenable to BiPAP therapy.  -He has been unable to use the BiPAP therapy since September 2021.  -He is scheduled to undergo repeat sleep studies in September of this year. 2. Essential hypertension  -Currently patient on Zoloft and Neurontin. 3. Body mass index 28.0-28.9, adult  -I strongly advised the patient to make efforts to lose weight. I discussed various modalities including moderate intensity intermittent exercises, diet control and bariatric surgery. If the patient loses even 10 to 15% of current body weight, it will be beneficial in improving the overall health. 4.  Nicotine dependence  -Patient counselled on the dangers of tobacco use and urged to quit. Also discussed the importance of a supportive environment and helped identify them. Discussed possibility of various Nicotine replacement therapies if experiencing prolonged craving or withdrawal symptoms. Discussed the possibility of negative mood or depression after quitting. Reassured that some weight gain after smoking is common and dietary, exercise, or lifestyle changes will be able to control it. Time spent counseling was >10mins. 5. Pulmonary nodule  -Patient has a lingular nodule which has been present since 2017. In 2017 it was 8 mm in size and now has become 9 mm in size. -PET CT scan was done recently which was personally reviewed by me. The activity in the left lingular nodule is very minimal and the size of the nodule remains subcentimeter. Currently tissue sampling of this pulm nodule will not only be difficult, is also not clearly indicated. I would like to follow-up with radiologically by repeat CT scan in 6 months time.       Return in about 2 months (around 10/15/2022). Get Kumar MD  Pulmonary Critical Care and Sleep Medicine  8/15/2022, 2:21 PM    This note was completed using dragon medical speech recognition software. Grammatical errors, random word insertions, pronoun errors and incomplete sentences are occasional consequences of this technology due to software limitations. If there are questions or concerns about the content of this note of information contained within the body of this dictation they should be addressed with the provider for clarification.

## 2022-08-18 PROBLEM — R31.9 HEMATURIA: Status: ACTIVE | Noted: 2022-08-18

## 2022-09-02 DIAGNOSIS — I10 ESSENTIAL HYPERTENSION: ICD-10-CM

## 2022-09-02 DIAGNOSIS — G47.33 OBSTRUCTIVE SLEEP APNEA (ADULT) (PEDIATRIC): Primary | ICD-10-CM

## 2022-09-07 ENCOUNTER — TELEPHONE (OUTPATIENT)
Dept: PULMONOLOGY | Age: 77
End: 2022-09-07

## 2022-09-07 NOTE — TELEPHONE ENCOUNTER
Aims on behalf ov [employer] called Alexia Luna) and she had said an authorization on sleep study in lab repeat sleep study said it was in denial and wanted to know if doctor wanted to do a peer to peer.    Shivani 30: [phone]

## 2022-09-20 RX ORDER — METOPROLOL SUCCINATE 25 MG/1
25 TABLET, EXTENDED RELEASE ORAL DAILY
Qty: 30 TABLET | Refills: 0 | Status: SHIPPED | OUTPATIENT
Start: 2022-09-20 | End: 2022-10-17 | Stop reason: SDUPTHER

## 2022-09-21 ENCOUNTER — HOSPITAL ENCOUNTER (OUTPATIENT)
Dept: SLEEP CENTER | Age: 77
Discharge: HOME | End: 2022-09-21
Payer: MEDICARE

## 2022-09-21 DIAGNOSIS — G47.33 OBSTRUCTIVE SLEEP APNEA (ADULT) (PEDIATRIC): ICD-10-CM

## 2022-09-21 DIAGNOSIS — I10 ESSENTIAL HYPERTENSION: ICD-10-CM

## 2022-09-21 PROCEDURE — 95806 SLEEP STUDY UNATT&RESP EFFT: CPT

## 2022-09-22 ENCOUNTER — OFFICE VISIT (OUTPATIENT)
Dept: ENT CLINIC | Age: 77
End: 2022-09-22
Payer: MEDICARE

## 2022-09-22 VITALS — HEART RATE: 73 BPM | SYSTOLIC BLOOD PRESSURE: 106 MMHG | DIASTOLIC BLOOD PRESSURE: 74 MMHG | TEMPERATURE: 97.1 F

## 2022-09-22 DIAGNOSIS — F17.200 CURRENT SMOKER: ICD-10-CM

## 2022-09-22 DIAGNOSIS — K11.9 LESION OF PAROTID GLAND: Primary | ICD-10-CM

## 2022-09-22 DIAGNOSIS — H61.23 BILATERAL IMPACTED CERUMEN: ICD-10-CM

## 2022-09-22 PROCEDURE — 1123F ACP DISCUSS/DSCN MKR DOCD: CPT | Performed by: STUDENT IN AN ORGANIZED HEALTH CARE EDUCATION/TRAINING PROGRAM

## 2022-09-22 PROCEDURE — 69210 REMOVE IMPACTED EAR WAX UNI: CPT | Performed by: STUDENT IN AN ORGANIZED HEALTH CARE EDUCATION/TRAINING PROGRAM

## 2022-09-22 PROCEDURE — 99204 OFFICE O/P NEW MOD 45 MIN: CPT | Performed by: STUDENT IN AN ORGANIZED HEALTH CARE EDUCATION/TRAINING PROGRAM

## 2022-09-22 NOTE — PROGRESS NOTES
[address] VISIT      Patient Name: Anila Vo  Medical Record Number:  [id number]  Primary Care Physician:  Mavis Garcia MD    ChiefComplaint     Chief Complaint   Patient presents with    Other     lesion parotid gland,       History of Present Illness     Anila Vo is an 69 y.o. male previously seen for hearing loss and tinnitus. Interval History:   Noted to have right sided hypermetabolic parotid lesion on PET scan from 8/1/22. Current smoker. Used to smoke 1 ppd, now down to 2-3 cigarettes a day. He does have a shellfish allergy but does tolerate IV contrast without issue, has had contrast recently. Past Medical History     Past Medical History:   Diagnosis Date    A-fib [facility])     Hypercholesteremia     Hypertension        Past Surgical History     Past Surgical History:   Procedure Laterality Date    COLONOSCOPY  05/24/2021    HEMORRHOID SURGERY      UROLIFT    SHOULDER SURGERY Right     RTC        Family History     Family History   Problem Relation Age of Onset    Cancer Other     High Blood Pressure Other     Stroke Other        Social History     Social History     Tobacco Use    Smoking status: Every Day     Packs/day: 0.50     Years: 50.00     Pack years: 25.00     Types: Cigarettes    Smokeless tobacco: Former     Quit date: 1/1/2011   Vaping Use    Vaping Use: Never used   Substance Use Topics    Alcohol use: No    Drug use: No        Allergies     Allergies   Allergen Reactions    Shellfish Allergy     Augmentin [Amoxicillin-Pot Clavulanate] Nausea And Vomiting       Medications     Current Outpatient Medications   Medication Sig Dispense Refill    buprenorphine (BUTRANS) 15 MCG/HR PTWK Place 1 patch onto the skin once a week for 30 days.  4 patch 0    OLANZapine (ZYPREXA) 5 MG tablet Take 1 tablet by mouth nightly 30 tablet 0    clonazePAM (KLONOPIN) 0.5 MG tablet Take 1 tablet by mouth 2 times daily as needed for Anxiety for up to 30 days. 60 tablet 0    mirabegron (MYRBETRIQ) 50 MG TB24 Take 50 mg by mouth daily 30 tablet 1    ibuprofen (ADVIL;MOTRIN) 400 MG tablet Take 1 tablet by mouth every 8 hours as needed for Pain 270 tablet 1    citalopram (CELEXA) 40 MG tablet Take 1 tablet by mouth daily 90 tablet 0    brexpiprazole (REXULTI) 3 MG TABS tablet Take 1 tablet by mouth daily 30 tablet 1    XARELTO 20 MG TABS tablet TAKE ONE TABLET BY MOUTH DAILY WITH BREAKFAST 30 tablet 5    ondansetron (ZOFRAN-ODT) 4 MG disintegrating tablet Take 1 tablet by mouth every 8 hours as needed for Nausea or Vomiting 30 tablet 2    atorvastatin (LIPITOR) 10 MG tablet Take 0.5 tablets by mouth daily 90 tablet 0    finasteride (PROSCAR) 5 MG tablet Take 1 tablet by mouth daily 90 tablet 0    tamsulosin (FLOMAX) 0.4 MG capsule Take 1 capsule by mouth daily 90 capsule 0    gabapentin (NEURONTIN) 300 MG capsule Take 1 capsule by mouth 2 times daily for 90 days. 180 capsule 0    metoprolol succinate (TOPROL XL) 25 MG extended release tablet Take 1 tablet by mouth daily (Patient not taking: Reported on 9/22/2022) 30 tablet 0    omeprazole (PRILOSEC) 40 MG delayed release capsule Take 1 capsule by mouth every morning (before breakfast) (Patient not taking: Reported on 9/22/2022) 30 capsule 1    butalbital-acetaminophen-caffeine (FIORICET, ESGIC) -40 MG per tablet Take 1 tablet by mouth every 6 hours as needed for Headaches 90 tablet 0    fluticasone (FLONASE) 50 MCG/ACT nasal spray 1 spray by Each Nostril route daily (Patient not taking: Reported on 9/22/2022) 16 g 3    ipratropium (ATROVENT) 0.03 % nasal spray 2 sprays by Each Nostril route daily (Patient not taking: Reported on 9/22/2022) 30 mL 3     No current facility-administered medications for this visit.        Review of Systems     REVIEW OF SYSTEM: See HPI above    PhysicalExam     Vitals:    09/22/22 1329   BP: 106/74   Pulse: 73   Temp: 97.1 °F (36.2 °C)   TempSrc: Temporal PHYSICAL EXAM  /74   Pulse 73   Temp 97.1 °F (36.2 °C) (Temporal)     GENERAL: No acute distress, alert and oriented. Smells of cigarette smoke. EYES: EOMI, Anti-icteric. NOSE: On anterior rhinoscopy there is no epistaxis, nasal mucosa moist and normal appearing, no purulent drainage. EARS: Normal external appearance; on portable otomicroscopy there is cerumen impaction noted bilaterally, see procedure note below  FACE: HB 1/6 bilaterally, symmetric appearing, sensation equal bilaterally  ORAL CAVITY: No masses or lesions visualized or palpated, uvula is midline, moist mucous membranes, no oropharyngeal masses or oropharyngeal obstruction. Missing multiple teeth. NECK: Normal range of motion, no thyromegaly, trachea is midline, no palpable lymphadenopathy or neck masses, no crepitus  CHEST: Normal respiratory effort, breathing comfortably, no retractions  SKIN: No rashes, normal appearing skin, no evidence of skin lesions/tumors  NEURO: Cranial Nerves 2, 3, 4, 5, 6, 7, 11, 12 grossly intact bilaterally     I have performed a head and neck physical exam personally or was physically present during the key or critical portions of the service. Procedure     Procedure: Binocular otomicroscopy with debridement of cerumen impaction    Pre-op: Cerumen impaction of the bilateral external auditory canals. Post op: Same  Procedure : Binocular otomicroscopy with debridement of cerumen of bilateral external auditory canals  Surgeon: Dr. Qian Summers DO  Estimated Blood Loss: None    Description of Procedure:    After obtaining verbal consent, the patient was placed in the examination chair in the reclined position.      -Right ear: External auditory canal with occluding cerumen limiting visualization of the tympanic membrane which was removed with use of #7 and #5 [place] suction, alligator forceps, and cerumen loop curet.   After successful removal of cerumen, the right tympanic membrane was visualized and without significant retractions or cholesteatoma, no middle ear effusions.   -Left ear: External auditory canal with occluding cerumen limiting visualization of the tympanic membrane which was removed with use of #7 and #5 Citizen of [place] suction, alligator forceps, and cerumen loop curet. After successful removal of cerumen, the left tympanic membrane was visualized and without significant retractions or cholesteatoma, no middle ear effusions. * Patient tolerated the procedure well with no complications      Data/Imaging Review     EXAMINATION:   Skull base to midthigh PET/CT       8/1/2022       TECHNIQUE:   Following IV injection of 13.65 mCi of F-18 FDG, PET  tumor imaging was   acquired from the base of the skull to the mid thighs. Computed tomography   was used for purposes of attenuation correction and anatomic localization. Fusion imaging was utilized for interpretation. Uptake time 52 min. Glucose level 102 mg/dl. COMPARISON:   CT abdomen pelvis dated 07/04/2022, 05/02/2017       HISTORY:   ORDERING SYSTEM PROVIDED HISTORY: Lung nodule       History of enlarging left upper lobe pulmonary nodule. FINDINGS:   HEAD/NECK: 1.2 cm nodule is seen within the right parotid gland, incompletely   imaged, SUV maximum 10.1. Bilateral carotid artery calcification. CHEST: Calcification of the thoracic aorta. Coronary artery calcification. Calcified subcarinal, right paratracheal, right hilar lymph nodes, in keeping   with granulomatous disease. Right axillary adenopathy is demonstrated with   low level activity, aggregate SUV maximum 1.3. The largest node measures   approximate 3.0 x 1.5 cm. Shotty left axillary lymph nodes are seen to a   lesser degree. The 9 mm anteromedial left upper lobe pulmonary nodule has SUV maximum 1.1. The nodule has enlarged as compared to the 2017 exam when it measured 6 mm.    Scattered linear and ground-glass opacity bilaterally, likely atelectasis. No pneumothorax or pleural effusion. ABDOMEN/PELVIS: Excretion of activity is seen from bilateral kidneys and   activity is seen within the urinary bladder. Bowel activity is seen which   can be physiologically variable. 1.3 cm short axis aortocaval lymph node demonstrates activity similar to   retroperitoneal background. 1.0 cm short axis left external iliac chain node   is also not markedly hypermetabolic, SUV maximum 1.1. Nodularity of the left adrenal gland, partially calcified, not markedly   changed as compared to the 2017 exam.  Right nephrolithiasis. Cholelithiasis. Calcification of the abdominal aorta. BONES/SOFT TISSUE: Sclerotic focus within the right iliac bone, not markedly   FDG avid. Postoperative change of the right shoulder. Impression   The slowly enlarging anteromedial left upper lobe pulmonary nodule   demonstrates low level activity. Leading differential considerations include   enlarging granuloma or given its small size, slow-growing malignancy. Biopsy   could be considered for further characterization given its growth since 2017. Low level activity is seen associated with right greater than left axillary   as well as retroperitoneal and iliac chain adenopathy. The findings can   reflect reactive nodes, low-grade lymphoma, or low-grade/treated metastatic   disease. 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 Images from PET scan performed on 8/1/2022 independent reviewed. In regards to the head and neck there is a hypermetabolic lesion of the right parotid gland. This was incompletely imaged and it is difficult to tell whether this is a superficial or deep parotid lesion. Assessment and Plan     1. Lesion of parotid gland  -Need to fully characterize with CT soft tissue neck with contrast.  Incompletely visualized on PET scan. - CT SOFT TISSUE NECK W CONTRAST; Future    2. Current smoker  -Recommend smoking cessation    3. Bilateral impacted cerumen  - Cerumen debrided in the office today  - Avoid Q-tip and self instrumentation of ears  - Hydrogen peroxide or Debrox (OTC) drops as needed or once every other week to help break up and soften wax  - Follow-up as needed for repeat debridement      Follow-Up     Return for after imaging. Dr. Andres Darden [place] [id number]  Department of Otolaryngology/Head and Neck Surgery  9/22/22    Medical Decision Making: The following items were considered in medical decision making:  Independent review of images  Review / order clinical lab tests  Review / order radiology tests  Decision to obtain old records      This note was generated completely or in part utilizing Dragon dictation speech recognition software. Occasionally, words are mistranscribed and despite editing, the text may contain inaccuracies due to incorrect word recognition. If further clarification is needed please contact the office at [phone].

## 2022-09-23 DIAGNOSIS — G47.33 OBSTRUCTIVE SLEEP APNEA (ADULT) (PEDIATRIC): Primary | ICD-10-CM

## 2022-09-23 LAB — PATHOLOGY/CYTOLOGY REPORT: NORMAL

## 2022-09-23 PROCEDURE — 95806 SLEEP STUDY UNATT&RESP EFFT: CPT | Performed by: INTERNAL MEDICINE

## 2022-09-24 ENCOUNTER — HOSPITAL ENCOUNTER (OUTPATIENT)
Dept: CT IMAGING | Age: 77
Discharge: HOME | End: 2022-09-24
Payer: MEDICARE

## 2022-09-24 DIAGNOSIS — K11.9 LESION OF PAROTID GLAND: ICD-10-CM

## 2022-09-24 LAB
CREAT SERPL-MCNC: 1 MG/DL (ref 0.8–1.3)
GFR AFRICAN AMERICAN: >60
GFR NON-AFRICAN AMERICAN: >60

## 2022-09-24 PROCEDURE — 70491 CT SOFT TISSUE NECK W/DYE: CPT

## 2022-09-24 PROCEDURE — 36415 COLL VENOUS BLD VENIPUNCTURE: CPT

## 2022-09-24 PROCEDURE — 82565 ASSAY OF CREATININE: CPT

## 2022-09-24 PROCEDURE — 6360000004 HC RX CONTRAST MEDICATION: Performed by: STUDENT IN AN ORGANIZED HEALTH CARE EDUCATION/TRAINING PROGRAM

## 2022-09-24 RX ADMIN — IOPAMIDOL 75 ML: 755 INJECTION, SOLUTION INTRAVENOUS at 09:55

## 2022-09-26 ENCOUNTER — TELEPHONE (OUTPATIENT)
Dept: PULMONOLOGY | Age: 77
End: 2022-09-26

## 2022-10-02 LAB — PATHOLOGY/CYTOLOGY REPORT: NORMAL

## 2022-10-03 ENCOUNTER — OFFICE VISIT (OUTPATIENT)
Dept: ENT CLINIC | Age: 77
End: 2022-10-03
Payer: MEDICARE

## 2022-10-03 VITALS — TEMPERATURE: 97.5 F | HEART RATE: 99 BPM | SYSTOLIC BLOOD PRESSURE: 115 MMHG | DIASTOLIC BLOOD PRESSURE: 76 MMHG

## 2022-10-03 DIAGNOSIS — J38.7 VALLECULAR CYST: ICD-10-CM

## 2022-10-03 DIAGNOSIS — K11.9 LESION OF PAROTID GLAND: Primary | ICD-10-CM

## 2022-10-03 DIAGNOSIS — R06.02 EXERTIONAL SHORTNESS OF BREATH: Primary | ICD-10-CM

## 2022-10-03 PROCEDURE — 31575 DIAGNOSTIC LARYNGOSCOPY: CPT | Performed by: STUDENT IN AN ORGANIZED HEALTH CARE EDUCATION/TRAINING PROGRAM

## 2022-10-03 PROCEDURE — 99214 OFFICE O/P EST MOD 30 MIN: CPT | Performed by: STUDENT IN AN ORGANIZED HEALTH CARE EDUCATION/TRAINING PROGRAM

## 2022-10-03 PROCEDURE — 1123F ACP DISCUSS/DSCN MKR DOCD: CPT | Performed by: STUDENT IN AN ORGANIZED HEALTH CARE EDUCATION/TRAINING PROGRAM

## 2022-10-03 NOTE — PROGRESS NOTES
[address] VISIT      Patient Name: Kendal Beltran  Medical Record Number:  [id number]  Primary Care Physician:  Latoya Cottrell MD    ChiefComplaint     Chief Complaint   Patient presents with    Follow-up     Review CT,        History of Present Illness     Kendal Beltran is an 69 y.o. male previously seen for right parotid lesion    Interval History:   No new symptoms. States that he is always fatigued. He is elected to hold off on any type of biopsy of his lung lesion. No dysphagia. Past Medical History     Past Medical History:   Diagnosis Date    A-fib [facility])     Hypercholesteremia     Hypertension        Past Surgical History     Past Surgical History:   Procedure Laterality Date    COLONOSCOPY  05/24/2021    HEMORRHOID SURGERY      UROLIFT    SHOULDER SURGERY Right     RTC        Family History     Family History   Problem Relation Age of Onset    Cancer Other     High Blood Pressure Other     Stroke Other        Social History     Social History     Tobacco Use    Smoking status: Every Day     Packs/day: 0.50     Years: 50.00     Pack years: 25.00     Types: Cigarettes    Smokeless tobacco: Former     Quit date: 1/1/2011   Vaping Use    Vaping Use: Never used   Substance Use Topics    Alcohol use: No    Drug use: No        Allergies     Allergies   Allergen Reactions    Shellfish Allergy     Augmentin [Amoxicillin-Pot Clavulanate] Nausea And Vomiting       Medications     Current Outpatient Medications   Medication Sig Dispense Refill    buprenorphine (BUTRANS) 15 MCG/HR PTWK Place 1 patch onto the skin once a week for 30 days. 4 patch 0    OLANZapine (ZYPREXA) 5 MG tablet Take 1 tablet by mouth nightly 30 tablet 0    clonazePAM (KLONOPIN) 0.5 MG tablet Take 1 tablet by mouth 2 times daily as needed for Anxiety for up to 30 days.  60 tablet 0    mirabegron (MYRBETRIQ) 50 MG TB24 Take 50 mg by mouth daily 30 tablet 1    ibuprofen (ADVIL;MOTRIN) 400 MG tablet Take 1 tablet by mouth every 8 hours as needed for Pain 270 tablet 1    citalopram (CELEXA) 40 MG tablet Take 1 tablet by mouth daily 90 tablet 0    brexpiprazole (REXULTI) 3 MG TABS tablet Take 1 tablet by mouth daily 30 tablet 1    XARELTO 20 MG TABS tablet TAKE ONE TABLET BY MOUTH DAILY WITH BREAKFAST 30 tablet 5    ondansetron (ZOFRAN-ODT) 4 MG disintegrating tablet Take 1 tablet by mouth every 8 hours as needed for Nausea or Vomiting 30 tablet 2    atorvastatin (LIPITOR) 10 MG tablet Take 0.5 tablets by mouth daily 90 tablet 0    finasteride (PROSCAR) 5 MG tablet Take 1 tablet by mouth daily 90 tablet 0    tamsulosin (FLOMAX) 0.4 MG capsule Take 1 capsule by mouth daily 90 capsule 0    gabapentin (NEURONTIN) 300 MG capsule Take 1 capsule by mouth 2 times daily for 90 days. 180 capsule 0    metoprolol succinate (TOPROL XL) 25 MG extended release tablet Take 1 tablet by mouth daily (Patient not taking: No sig reported) 30 tablet 0    omeprazole (PRILOSEC) 40 MG delayed release capsule Take 1 capsule by mouth every morning (before breakfast) (Patient not taking: Reported on 9/22/2022) 30 capsule 1    butalbital-acetaminophen-caffeine (FIORICET, ESGIC) -40 MG per tablet Take 1 tablet by mouth every 6 hours as needed for Headaches 90 tablet 0    fluticasone (FLONASE) 50 MCG/ACT nasal spray 1 spray by Each Nostril route daily (Patient not taking: No sig reported) 16 g 3    ipratropium (ATROVENT) 0.03 % nasal spray 2 sprays by Each Nostril route daily (Patient not taking: No sig reported) 30 mL 3     No current facility-administered medications for this visit. Review of Systems     REVIEW OF SYSTEM: See HPI above    PhysicalExam     Vitals:    10/03/22 0932   BP: 115/76   Pulse: 99   Temp: 97.5 °F (36.4 °C)   TempSrc: Temporal       PHYSICAL EXAM  /76   Pulse 99   Temp 97.5 °F (36.4 °C) (Temporal)     EYES: EOMI, Anti-icteric.   NOSE: On anterior rhinoscopy there is no No overlying mucosal ulcerative changes. Airway grossly patent. Epiglottis: Upright, in normal anatomical position  True Vocal Cord: Anatomically normal, no masses or inflammation, normal abduction and adduction upon inspiration and phonation  False Vocal Cord: normal appearing without masses  Hypopharynx Mucosa: No masses or inflammation of the piriform sinuses or postcricoid area  Arytenoids: Normal mucosa, no dislocation appreciated     * Patient tolerated the procedure well with no complications   * Patient was instructed not to eat for 30 minutes following procedure. * Patient was instructed that they may notice minor bleeding. Attestation:   I was present for the entire viewing, including introduction and removal of the scope. Data/Imaging Review     CT Result (most recent):  CT SOFT TISSUE NECK W CONTRAST 09/24/2022    Narrative  EXAMINATION:  CT OF THE NECK SOFT TISSUE WITH CONTRAST  9/24/2022    TECHNIQUE:  CT of the neck was performed with the administration of intravenous contrast.  Multiplanar reformatted images are provided for review. Automated exposure  control, iterative reconstruction, and/or weight based adjustment of the  mA/kV was utilized to reduce the radiation dose to as low as reasonably  achievable. COMPARISON:  PET-CT 08/01/2022. HISTORY:  ORDERING SYSTEM PROVIDED HISTORY: Lesion of parotid gland  TECHNOLOGIST PROVIDED HISTORY:  STAT Creatinine as needed:->Yes  Reason for exam:->Right parotid lesion  Reason for Exam: Right parotid lesion, Lesion of parotid gland    Initial evaluation. FINDINGS:  PHARYNX/LARYNX:  There is a is cystic lesion, which effaces the left  vallecula measuring 9 x 14 mm. Otherwise, the upper aerodigestive tract  appears grossly symmetric without evidence of a discrete mass.   The  epiglottis appears normal.    SALIVARY GLANDS/THYROID:  There is a relatively ill-defined enhancing lesion  within the right parotid gland measuring approximately 11 x 13 x 12 mm or  measure. The left parotid and bilateral submandibular glands demonstrate no  acute abnormality. No acute abnormality seen of the thyroid gland. LYMPH NODES:  There appear to be several borderline prominent supraclavicular  lymph nodes bilaterally. No evidence of cervical lymphadenopathy otherwise  seen by size criteria. SOFT TISSUES:  No appreciable soft tissue swelling seen. BRAIN/ORBITS/SINUSES:  Limited evaluation of the intracranial compartment  demonstrates no acute abnormality. No acute abnormality seen of the orbits  or paranasal sinuses. There appears to be a trace right mastoid effusion. LUNG APICES/SUPERIOR MEDIASTINUM:  There is a 9 mm subsolid nodule within the  left upper lobe, which appears similar to the recent PET-CT. Numerous  subcentimeter mediastinal lymph nodes are seen. There appear to be enlarged  bilateral axillary lymph nodes. BONES:  No aggressive appearing lytic or blastic bony lesion. Impression  1. A relatively ill-defined nodule is seen in the right parotid gland  measuring up to 13 mm in size. 2. Numerous borderline prominent supraclavicular lymph nodes bilaterally. 3. There is effacement of the left vallecula due to a cystic lesion, which  appears similar to the PET-CT. This could represent a mucous retention cyst.  Consider correlation with direct visualization. 4. Enlarged bilateral axillary lymph nodes. 5. Unchanged 9 mm subsolid left upper lobe pulmonary nodule. Images from CT neck with contrast performed on 9/24/2022 independent reviewed which demonstrates 1.3 cm right parotid gland lesion which appears to be just posterior to the retromandibular vein. This lesion appears to be mainly localized to the superficial parotid lobe but there is some slight extension to the deep parotid lobe as it extends just deep to the retromandibular vein. There is effacement of the inferior aspect of the left vallecula secondary to cystic lesion. Airway grossly patent. Assessment and Plan     1. Lesion of parotid gland  -Reviewed treatment options including observation with repeat CT scan in 6 months versus ultrasound-guided biopsy versus parotidectomy. Patient would like to hold off on any type of interventions at this point. Plan for CT scan soft tissue neck in 6 months.  -Recommend smoking cessation    2. Vallecular cyst  -Flexible fiberoptic laryngoscopy demonstrating what appears to be a cystic lesion of the left base of tongue which was duly noted on CT scan of the neck. Denies dysphagia. Offered direct laryngoscopy with biopsy and marsupialization but patient would like to hold off on this for now as he is relatively asymptomatic from a swallowing and throat standpoint.  -Repeat CT scan as noted above in 6 months for continued surveillance      Follow-Up     Return in about 6 months (around 4/3/2023) for after imaging. Liliana Roche   Department of Otolaryngology/Head and Neck Surgery  10/3/22    Medical Decision Making: The following items were considered in medical decision making:  Independent review of images  Review / order clinical lab tests  Review / order radiology tests  Decision to obtain old records      This note was generated completely or in part utilizing Dragon dictation speech recognition software. Occasionally, words are mistranscribed and despite editing, the text may contain inaccuracies due to incorrect word recognition. If further clarification is needed please contact the office at [phone].

## 2022-10-07 ENCOUNTER — HOSPITAL ENCOUNTER (OUTPATIENT)
Age: 77
Discharge: HOME | End: 2022-10-07
Payer: MEDICARE

## 2022-10-07 ENCOUNTER — HOSPITAL ENCOUNTER (OUTPATIENT)
Dept: PULMONOLOGY | Age: 77
Discharge: HOME | End: 2022-10-07
Payer: MEDICARE

## 2022-10-07 VITALS — RESPIRATION RATE: 18 BRPM | OXYGEN SATURATION: 96 % | HEART RATE: 98 BPM

## 2022-10-07 DIAGNOSIS — R06.02 EXERTIONAL SHORTNESS OF BREATH: ICD-10-CM

## 2022-10-07 LAB
ANION GAP SERPL CALCULATED.3IONS-SCNC: 14 MMOL/L (ref 3–16)
BUN BLDV-MCNC: 23 MG/DL (ref 7–20)
CALCIUM SERPL-MCNC: 9.4 MG/DL (ref 8.3–10.6)
CHLORIDE BLD-SCNC: 103 MMOL/L (ref 99–110)
CO2: 26 MMOL/L (ref 21–32)
CREAT SERPL-MCNC: 1 MG/DL (ref 0.8–1.3)
DLCO %PRED: 60 %
DLCO PRED: NORMAL
DLCO/VA %PRED: NORMAL
DLCO/VA PRED: NORMAL
DLCO/VA: NORMAL
DLCO: NORMAL
EXPIRATORY TIME-POST: NORMAL
EXPIRATORY TIME: NORMAL
FEF 25-75% %CHNG: NORMAL
FEF 25-75% %PRED-POST: NORMAL
FEF 25-75% %PRED-PRE: NORMAL
FEF 25-75% PRED: NORMAL
FEF 25-75%-POST: NORMAL
FEF 25-75%-PRE: NORMAL
FEV1 %PRED-POST: 80 %
FEV1 %PRED-PRE: 76 %
FEV1 PRED: NORMAL
FEV1-POST: NORMAL
FEV1-PRE: NORMAL
FEV1/FVC %PRED-POST: NORMAL
FEV1/FVC %PRED-PRE: NORMAL
FEV1/FVC PRED: NORMAL
FEV1/FVC-POST: 75 %
FEV1/FVC-PRE: 76 %
FVC %PRED-POST: NORMAL
FVC %PRED-PRE: NORMAL
FVC PRED: NORMAL
FVC-POST: NORMAL
FVC-PRE: NORMAL
GAW %PRED: NORMAL
GAW PRED: NORMAL
GAW: NORMAL
GFR AFRICAN AMERICAN: >60
GFR NON-AFRICAN AMERICAN: >60
GLUCOSE BLD-MCNC: 85 MG/DL (ref 70–99)
HCT VFR BLD CALC: 41.4 % (ref 40.5–52.5)
HEMOGLOBIN: 14.2 G/DL (ref 13.5–17.5)
IC %PRED: NORMAL
IC PRED: NORMAL
IC: NORMAL
MCH RBC QN AUTO: 31.2 PG (ref 26–34)
MCHC RBC AUTO-ENTMCNC: 34.3 G/DL (ref 31–36)
MCV RBC AUTO: 90.9 FL (ref 80–100)
MEP: NORMAL
MIP: NORMAL
MVV %PRED-PRE: NORMAL
MVV PRED: NORMAL
MVV-PRE: NORMAL
PDW BLD-RTO: 12.8 % (ref 12.4–15.4)
PEF %PRED-POST: NORMAL
PEF %PRED-PRE: NORMAL
PEF PRED: NORMAL
PEF%CHNG: NORMAL
PEF-POST: NORMAL
PEF-PRE: NORMAL
PLATELET # BLD: 173 K/UL (ref 135–450)
PMV BLD AUTO: 9.6 FL (ref 5–10.5)
POTASSIUM SERPL-SCNC: 4.6 MMOL/L (ref 3.5–5.1)
RAW %PRED: NORMAL
RAW PRED: NORMAL
RAW: NORMAL
RBC # BLD: 4.55 M/UL (ref 4.2–5.9)
RV %PRED: NORMAL
RV PRED: NORMAL
RV: NORMAL
SODIUM BLD-SCNC: 143 MMOL/L (ref 136–145)
SVC %PRED: NORMAL
SVC PRED: NORMAL
SVC: NORMAL
TLC %PRED: 97 %
TLC PRED: NORMAL
TLC: NORMAL
VA %PRED: NORMAL
VA PRED: NORMAL
VA: NORMAL
VTG %PRED: NORMAL
VTG PRED: NORMAL
VTG: NORMAL
WBC # BLD: 10.2 K/UL (ref 4–11)

## 2022-10-07 PROCEDURE — 94760 N-INVAS EAR/PLS OXIMETRY 1: CPT

## 2022-10-07 PROCEDURE — 6370000000 HC RX 637 (ALT 250 FOR IP): Performed by: INTERNAL MEDICINE

## 2022-10-07 PROCEDURE — 94200 LUNG FUNCTION TEST (MBC/MVV): CPT

## 2022-10-07 PROCEDURE — 94060 EVALUATION OF WHEEZING: CPT

## 2022-10-07 PROCEDURE — 85027 COMPLETE CBC AUTOMATED: CPT

## 2022-10-07 PROCEDURE — 80048 BASIC METABOLIC PNL TOTAL CA: CPT

## 2022-10-07 PROCEDURE — 36415 COLL VENOUS BLD VENIPUNCTURE: CPT

## 2022-10-07 PROCEDURE — 94726 PLETHYSMOGRAPHY LUNG VOLUMES: CPT

## 2022-10-07 PROCEDURE — 94729 DIFFUSING CAPACITY: CPT

## 2022-10-07 RX ORDER — ALBUTEROL SULFATE 90 UG/1
4 AEROSOL, METERED RESPIRATORY (INHALATION) ONCE
Status: COMPLETED | OUTPATIENT
Start: 2022-10-07 | End: 2022-10-07

## 2022-10-07 RX ADMIN — Medication 4 PUFF: at 15:17

## 2022-10-07 ASSESSMENT — PULMONARY FUNCTION TESTS
FEV1_PERCENT_PREDICTED_POST: 80
FEV1_PERCENT_PREDICTED_PRE: 76
FEV1/FVC_PRE: 76
FEV1/FVC_POST: 75

## 2022-10-09 ENCOUNTER — HOSPITAL ENCOUNTER (OUTPATIENT)
Dept: SLEEP CENTER | Age: 77
Discharge: HOME | End: 2022-10-09
Payer: MEDICARE

## 2022-10-09 DIAGNOSIS — G47.33 OBSTRUCTIVE SLEEP APNEA (ADULT) (PEDIATRIC): ICD-10-CM

## 2022-10-09 PROCEDURE — 95811 POLYSOM 6/>YRS CPAP 4/> PARM: CPT

## 2022-10-09 PROCEDURE — 95811 POLYSOM 6/>YRS CPAP 4/> PARM: CPT | Performed by: INTERNAL MEDICINE

## 2022-10-10 DIAGNOSIS — R06.3 CENTRAL SLEEP APNEA DUE TO CHEYNE-STOKES RESPIRATION: Primary | ICD-10-CM

## 2022-10-10 DIAGNOSIS — G47.33 OBSTRUCTIVE SLEEP APNEA (ADULT) (PEDIATRIC): ICD-10-CM

## 2022-10-10 NOTE — PROCEDURES
[facility] [address]                     [address] [facility], [address]                               PULMONARY FUNCTION    PATIENT NAME: Vera Martin                       :        1945  MED REC NO:   [id number]                          ROOM:  ACCOUNT NO:   [de-identified]                           ADMIT DATE: 10/07/2022  PROVIDER:     Suzy Rasheed MD    DATE OF PROCEDURE:  10/07/2022    Spirometry reveals decreased FVC at 2.89 liters, which is 72% predicted. FEV1 is normal.  FEV1/FVC ratio is normal.  Expiratory flow rates are  normal.  There is no significant change after inhaled bronchodilators. Lung volumes reveal normal total lung capacity, vital capacity, and  residual volume. Diffusion capacity is normal.    IMPRESSION:  Normal pulmonary function testing.         Abbie Medina MD    D: 10/10/2022 12:03:45       T: 10/10/2022 17:03:30     [email or web address]  Job#: [id number]     Doc#: [id number]    CC:

## 2022-10-14 ENCOUNTER — TELEPHONE (OUTPATIENT)
Dept: PULMONOLOGY | Age: 77
End: 2022-10-14

## 2022-10-14 NOTE — TELEPHONE ENCOUNTER
Spoke to the patient's girlfriend regarding the results.    Dede Price will call to schedule ASV titration

## 2022-10-20 RX ORDER — VENLAFAXINE HYDROCHLORIDE 75 MG/1
150 CAPSULE, EXTENDED RELEASE ORAL DAILY
Qty: 30 CAPSULE | Refills: 3 | Status: SHIPPED | OUTPATIENT
Start: 2022-10-20 | End: 2022-12-20

## 2022-11-03 DIAGNOSIS — M54.17 LUMBOSACRAL RADICULOPATHY: Primary | ICD-10-CM

## 2022-11-03 DIAGNOSIS — M48.061 SPINAL STENOSIS OF LUMBAR REGION WITHOUT NEUROGENIC CLAUDICATION: ICD-10-CM

## 2022-11-04 ENCOUNTER — HOSPITAL ENCOUNTER (OUTPATIENT)
Dept: SLEEP CENTER | Age: 77
Discharge: HOME | End: 2022-11-04
Payer: MEDICARE

## 2022-11-04 DIAGNOSIS — R06.3 CENTRAL SLEEP APNEA DUE TO CHEYNE-STOKES RESPIRATION: ICD-10-CM

## 2022-11-04 DIAGNOSIS — G47.33 OBSTRUCTIVE SLEEP APNEA (ADULT) (PEDIATRIC): ICD-10-CM

## 2022-11-04 PROCEDURE — 95811 POLYSOM 6/>YRS CPAP 4/> PARM: CPT

## 2022-11-07 PROBLEM — R06.3 CENTRAL SLEEP APNEA DUE TO CHEYNE-STOKES RESPIRATION: Status: ACTIVE | Noted: 2022-11-07

## 2022-11-07 PROCEDURE — 95811 POLYSOM 6/>YRS CPAP 4/> PARM: CPT | Performed by: INTERNAL MEDICINE

## 2022-11-08 DIAGNOSIS — F01.511 VASCULAR DEMENTIA WITH AGITATION, UNSPECIFIED DEMENTIA SEVERITY: Primary | ICD-10-CM

## 2022-11-25 ENCOUNTER — HOSPITAL ENCOUNTER (OUTPATIENT)
Dept: CT IMAGING | Age: 77
Discharge: HOME | End: 2022-11-25
Payer: MEDICARE

## 2022-11-25 DIAGNOSIS — F01.511 VASCULAR DEMENTIA WITH AGITATION, UNSPECIFIED DEMENTIA SEVERITY: ICD-10-CM

## 2022-11-25 LAB
BUN BLDV-MCNC: 26 MG/DL (ref 7–20)
CREAT SERPL-MCNC: 1.2 MG/DL (ref 0.8–1.3)
GFR SERPL CREATININE-BSD FRML MDRD: >60 ML/MIN/{1.73_M2}

## 2022-11-25 PROCEDURE — 84520 ASSAY OF UREA NITROGEN: CPT

## 2022-11-25 PROCEDURE — 82565 ASSAY OF CREATININE: CPT

## 2022-11-25 PROCEDURE — 70470 CT HEAD/BRAIN W/O & W/DYE: CPT

## 2022-11-25 PROCEDURE — 36415 COLL VENOUS BLD VENIPUNCTURE: CPT

## 2022-11-25 PROCEDURE — 6360000004 HC RX CONTRAST MEDICATION: Performed by: INTERNAL MEDICINE

## 2022-11-25 RX ADMIN — IOPAMIDOL 75 ML: 755 INJECTION, SOLUTION INTRAVENOUS at 08:27

## 2022-11-25 NOTE — PROGRESS NOTES
Renal function labs ordered per protocol based on required criteria for administration of contrast media. Patient assessment for contraindications completed. Contrast administration guidelines were followed based on renal function lab results.

## 2022-12-13 ENCOUNTER — HOSPITAL ENCOUNTER (OUTPATIENT)
Dept: CT IMAGING | Age: 77
Discharge: HOME | End: 2022-12-13
Payer: MEDICARE

## 2022-12-13 DIAGNOSIS — R63.4 ABNORMAL WEIGHT LOSS: ICD-10-CM

## 2022-12-13 DIAGNOSIS — R59.1 LYMPHADENOPATHY: ICD-10-CM

## 2022-12-13 DIAGNOSIS — R91.1 LUNG NODULE: ICD-10-CM

## 2022-12-13 PROCEDURE — 6360000004 HC RX CONTRAST MEDICATION: Performed by: INTERNAL MEDICINE

## 2022-12-13 PROCEDURE — 74177 CT ABD & PELVIS W/CONTRAST: CPT

## 2022-12-13 RX ADMIN — DIATRIZOATE MEGLUMINE AND DIATRIZOATE SODIUM 20 ML: 660; 100 LIQUID ORAL; RECTAL at 15:48

## 2022-12-13 RX ADMIN — IOPAMIDOL 75 ML: 755 INJECTION, SOLUTION INTRAVENOUS at 15:49

## 2022-12-13 NOTE — PROGRESS NOTES
Name_______________________________________Printed:____________________  Date and time of surgery____12/16/22________1300____________Arrival Time:_____1200___________   1. The instructions given regarding when and if a patient needs to stop oral intake prior to surgery varies. Follow the specific instructions you were given                  _x__Nothing to eat or to drink after Midnight the night before.                   ____Carbo loading or ERAS instructions will be given to select patients-if you have been given those instructions -please do the following                           The evening before your surgery after dinner before midnight drink 40 ounces of gatorade. If you are diabetic use sugar free. The morning of surgery drink 40 ounces of water. This needs to be finished 3 hours prior to your surgery start time. 2. Take the following pills with a small sip of water on the morning of surgery___________________________________________________                  Do not take blood pressure medications ending in pril or sartan the christiano prior to surgery or the morning of surgery. Dr Vamsi Muhammad patient are not to take any medications the AM of surgery. 3. Aspirin, Ibuprofen, Advil, Naproxen, Vitamin E and other Anti-inflammatory products and supplements should be stopped for 5 -7days before surgery or as directed by your physician. 4. Check with your Doctor regarding stopping Plavix, Coumadin,Eliquis, Lovenox,Effient,Pradaxa,Xarelto, Fragmin or other blood thinners and follow their instructions. 5. Do not smoke, and do not drink any alcoholic beverages 24 hours prior to surgery. This includes NA Beer. Refrain from the usage of any recreational drugs. 6. You may brush your teeth and gargle the morning of surgery. DO NOT SWALLOW WATER   7. You MUST make arrangements for a responsible adult to stay on site while you are here and take you home after your surgery.  You will not be allowed to leave alone or drive yourself home. It is strongly suggested someone stay with you the first 24 hrs. Your surgery will be cancelled if you do not have a ride home. 8. A parent/legal guardian must accompany a child scheduled for surgery and plan to stay at the hospital until the child is discharged. Please do not bring other children with you. 9. Please wear simple, loose fitting clothing to the hospital.  Danilo Acharya not bring valuables (money, credit cards, checkbooks, etc.) Do not wear any makeup (including no eye makeup) or nail polish on your fingers or toes. 10. DO NOT wear any jewelry or piercings on day of surgery. All body piercing jewelry must be removed. 11. If you have _x  __dentures, they will be removed before going to the OR; we will provide you a container. If you wear ___contact lenses or ___glasses, they will be removed; please bring a case for them. 12. Please see your family doctor/pediatrician for a history & physical and/or concerning medications. Bring any test results/reports from your physician's office. PCP__________________Phone___________H&P Appt. Date________             13 If you  have a Living Will and Durable Power of  for Healthcare, please bring in a copy. 15. Notify your Surgeon if you develop any illness between now and surgery  time, cough, cold, fever, sore throat, nausea, vomiting, etc.  Please notify your surgeon if you experience dizziness, shortness of breath or blurred vision between now & the time of your surgery             15. DO NOT shave your operative site 96 hours prior to surgery. For face & neck surgery, men may use an electric razor 48 hours prior to surgery. 16. Shower the night before or morning of surgery using an antibacterial soap or as you have been instructed. 17. To provide excellent care visitors will be limited to one in the room at any given time.              18.  Please bring picture ID and insurance card. 19.  Visit our web site for additional information:  [email or web address]              20.During flu season no children under the age of 15 are permitted in the hospital for the safety of all patients. 21. If you take a long acting insulin in the evening only  take half of your usual  dose the night  before your procedure              22. If you use a c-pap please bring DOS if staying overnight,             23.For your convenience [facility] has a pharmacy on site to fill your prescriptions. 24. If you use oxygen and have a portable tank please bring it  with you the DOS             25. Bring a complete list of all your medications with name and dose include any supplements. 26. Other__________________________________________   *Please call pre admission testing if you any further questions   Eli Gallego   [address]    [address]  [phone]   [address] [place] [address]       VISITOR POLICY(subject to change)    Current policy is 2 visitors per patient. No children. Mask is  at the discretion of the facility. Visiting hours are 8a-8p. Overnight visitors will be at the discretion of the nurse. All policies subject to change. All above information reviewed with patient in person or by phone. Patient verbalizes understanding. All questions and concerns addressed.                                                                             Patient                     Patient/Rep____________________                                                                                                                                    PRE OP INSTRUCTIONS

## 2022-12-16 ENCOUNTER — ANESTHESIA EVENT (OUTPATIENT)
Dept: OPERATING ROOM | Age: 77
End: 2022-12-16
Payer: MEDICARE

## 2022-12-16 ENCOUNTER — ANESTHESIA (OUTPATIENT)
Dept: OPERATING ROOM | Age: 77
End: 2022-12-16
Payer: MEDICARE

## 2022-12-16 ENCOUNTER — APPOINTMENT (OUTPATIENT)
Dept: GENERAL RADIOLOGY | Age: 77
End: 2022-12-16
Attending: ANESTHESIOLOGY
Payer: MEDICARE

## 2022-12-16 ENCOUNTER — HOSPITAL ENCOUNTER (OUTPATIENT)
Age: 77
Setting detail: OUTPATIENT SURGERY
Discharge: HOME | End: 2022-12-16
Attending: ANESTHESIOLOGY | Admitting: ANESTHESIOLOGY
Payer: MEDICARE

## 2022-12-16 VITALS
DIASTOLIC BLOOD PRESSURE: 73 MMHG | BODY MASS INDEX: 23.55 KG/M2 | RESPIRATION RATE: 16 BRPM | WEIGHT: 159 LBS | HEART RATE: 86 BPM | SYSTOLIC BLOOD PRESSURE: 141 MMHG | OXYGEN SATURATION: 93 % | TEMPERATURE: 97.1 F | HEIGHT: 69 IN

## 2022-12-16 PROCEDURE — 7100000000 HC PACU RECOVERY - FIRST 15 MIN: Performed by: ANESTHESIOLOGY

## 2022-12-16 PROCEDURE — C1889 IMPLANT/INSERT DEVICE, NOC: HCPCS | Performed by: ANESTHESIOLOGY

## 2022-12-16 PROCEDURE — 7100000011 HC PHASE II RECOVERY - ADDTL 15 MIN: Performed by: ANESTHESIOLOGY

## 2022-12-16 PROCEDURE — 2580000003 HC RX 258: Performed by: ANESTHESIOLOGY

## 2022-12-16 PROCEDURE — 2500000003 HC RX 250 WO HCPCS: Performed by: NURSE ANESTHETIST, CERTIFIED REGISTERED

## 2022-12-16 PROCEDURE — 3600000012 HC SURGERY LEVEL 2 ADDTL 15MIN: Performed by: ANESTHESIOLOGY

## 2022-12-16 PROCEDURE — 2500000003 HC RX 250 WO HCPCS: Performed by: ANESTHESIOLOGY

## 2022-12-16 PROCEDURE — 3700000000 HC ANESTHESIA ATTENDED CARE: Performed by: ANESTHESIOLOGY

## 2022-12-16 PROCEDURE — 3700000001 HC ADD 15 MINUTES (ANESTHESIA): Performed by: ANESTHESIOLOGY

## 2022-12-16 PROCEDURE — 2709999900 HC NON-CHARGEABLE SUPPLY: Performed by: ANESTHESIOLOGY

## 2022-12-16 PROCEDURE — 6360000002 HC RX W HCPCS: Performed by: ANESTHESIOLOGY

## 2022-12-16 PROCEDURE — 3600000002 HC SURGERY LEVEL 2 BASE: Performed by: ANESTHESIOLOGY

## 2022-12-16 PROCEDURE — 7100000010 HC PHASE II RECOVERY - FIRST 15 MIN: Performed by: ANESTHESIOLOGY

## 2022-12-16 PROCEDURE — 3209999900 FLUORO FOR SURGICAL PROCEDURES

## 2022-12-16 PROCEDURE — 6360000004 HC RX CONTRAST MEDICATION: Performed by: ANESTHESIOLOGY

## 2022-12-16 PROCEDURE — 7100000001 HC PACU RECOVERY - ADDTL 15 MIN: Performed by: ANESTHESIOLOGY

## 2022-12-16 PROCEDURE — 6360000002 HC RX W HCPCS: Performed by: NURSE ANESTHETIST, CERTIFIED REGISTERED

## 2022-12-16 RX ORDER — MIDAZOLAM HYDROCHLORIDE 1 MG/ML
INJECTION INTRAMUSCULAR; INTRAVENOUS PRN
Status: DISCONTINUED | OUTPATIENT
Start: 2022-12-16 | End: 2022-12-16 | Stop reason: SDUPTHER

## 2022-12-16 RX ORDER — PROPOFOL 10 MG/ML
INJECTION, EMULSION INTRAVENOUS CONTINUOUS PRN
Status: DISCONTINUED | OUTPATIENT
Start: 2022-12-16 | End: 2022-12-16 | Stop reason: SDUPTHER

## 2022-12-16 RX ORDER — SODIUM CHLORIDE 9 MG/ML
INJECTION, SOLUTION INTRAVENOUS CONTINUOUS
Status: DISCONTINUED | OUTPATIENT
Start: 2022-12-16 | End: 2022-12-16 | Stop reason: HOSPADM

## 2022-12-16 RX ORDER — KETAMINE HCL IN NACL, ISO-OSM 100MG/10ML
SYRINGE (ML) INJECTION PRN
Status: DISCONTINUED | OUTPATIENT
Start: 2022-12-16 | End: 2022-12-16 | Stop reason: SDUPTHER

## 2022-12-16 RX ORDER — LIDOCAINE HYDROCHLORIDE 20 MG/ML
INJECTION, SOLUTION INFILTRATION; PERINEURAL PRN
Status: DISCONTINUED | OUTPATIENT
Start: 2022-12-16 | End: 2022-12-16 | Stop reason: SDUPTHER

## 2022-12-16 RX ORDER — ONDANSETRON 2 MG/ML
INJECTION INTRAMUSCULAR; INTRAVENOUS PRN
Status: DISCONTINUED | OUTPATIENT
Start: 2022-12-16 | End: 2022-12-16 | Stop reason: SDUPTHER

## 2022-12-16 RX ORDER — GABAPENTIN 300 MG/1
300 CAPSULE ORAL 3 TIMES DAILY
COMMUNITY
End: 2022-12-20 | Stop reason: CLARIF

## 2022-12-16 RX ORDER — SODIUM CHLORIDE 9 MG/ML
INJECTION, SOLUTION INTRAVENOUS PRN
Status: DISCONTINUED | OUTPATIENT
Start: 2022-12-16 | End: 2022-12-16 | Stop reason: HOSPADM

## 2022-12-16 RX ORDER — LIDOCAINE HYDROCHLORIDE 10 MG/ML
0.5 INJECTION, SOLUTION EPIDURAL; INFILTRATION; INTRACAUDAL; PERINEURAL ONCE
Status: DISCONTINUED | OUTPATIENT
Start: 2022-12-16 | End: 2022-12-16 | Stop reason: HOSPADM

## 2022-12-16 RX ORDER — OLANZAPINE 5 MG/1
5 TABLET, ORALLY DISINTEGRATING ORAL NIGHTLY
COMMUNITY
End: 2022-12-20

## 2022-12-16 RX ORDER — DIPHENHYDRAMINE HCL 25 MG
50 CAPSULE ORAL EVERY 6 HOURS PRN
COMMUNITY

## 2022-12-16 RX ORDER — ONDANSETRON 2 MG/ML
4 INJECTION INTRAMUSCULAR; INTRAVENOUS
Status: DISCONTINUED | OUTPATIENT
Start: 2022-12-16 | End: 2022-12-16 | Stop reason: HOSPADM

## 2022-12-16 RX ORDER — SODIUM CHLORIDE 0.9 % (FLUSH) 0.9 %
5-40 SYRINGE (ML) INJECTION EVERY 12 HOURS SCHEDULED
Status: DISCONTINUED | OUTPATIENT
Start: 2022-12-16 | End: 2022-12-16 | Stop reason: HOSPADM

## 2022-12-16 RX ORDER — SODIUM CHLORIDE 0.9 % (FLUSH) 0.9 %
5-40 SYRINGE (ML) INJECTION PRN
Status: DISCONTINUED | OUTPATIENT
Start: 2022-12-16 | End: 2022-12-16 | Stop reason: HOSPADM

## 2022-12-16 RX ORDER — MIRTAZAPINE 7.5 MG/1
7.5 TABLET, FILM COATED ORAL NIGHTLY
COMMUNITY
Start: 2022-12-09

## 2022-12-16 RX ORDER — DEXAMETHASONE SODIUM PHOSPHATE 4 MG/ML
INJECTION, SOLUTION INTRA-ARTICULAR; INTRALESIONAL; INTRAMUSCULAR; INTRAVENOUS; SOFT TISSUE PRN
Status: DISCONTINUED | OUTPATIENT
Start: 2022-12-16 | End: 2022-12-16 | Stop reason: SDUPTHER

## 2022-12-16 RX ORDER — DIPHENHYDRAMINE HYDROCHLORIDE 50 MG/ML
INJECTION INTRAMUSCULAR; INTRAVENOUS PRN
Status: DISCONTINUED | OUTPATIENT
Start: 2022-12-16 | End: 2022-12-16 | Stop reason: SDUPTHER

## 2022-12-16 RX ORDER — HYDROMORPHONE HCL 110MG/55ML
0.5 PATIENT CONTROLLED ANALGESIA SYRINGE INTRAVENOUS EVERY 5 MIN PRN
Status: DISCONTINUED | OUTPATIENT
Start: 2022-12-16 | End: 2022-12-16 | Stop reason: HOSPADM

## 2022-12-16 RX ADMIN — SODIUM CHLORIDE: 9 INJECTION, SOLUTION INTRAVENOUS at 12:23

## 2022-12-16 RX ADMIN — ONDANSETRON 4 MG: 2 INJECTION INTRAMUSCULAR; INTRAVENOUS at 13:10

## 2022-12-16 RX ADMIN — PROPOFOL 50 MCG/KG/MIN: 10 INJECTION, EMULSION INTRAVENOUS at 13:09

## 2022-12-16 RX ADMIN — MIDAZOLAM 1 MG: 1 INJECTION INTRAMUSCULAR; INTRAVENOUS at 13:16

## 2022-12-16 RX ADMIN — CEFAZOLIN 2000 MG: 2 INJECTION, POWDER, FOR SOLUTION INTRAMUSCULAR; INTRAVENOUS at 13:00

## 2022-12-16 RX ADMIN — DIPHENHYDRAMINE HYDROCHLORIDE 25 MG: 50 INJECTION, SOLUTION INTRAMUSCULAR; INTRAVENOUS at 13:07

## 2022-12-16 RX ADMIN — MIDAZOLAM 1 MG: 1 INJECTION INTRAMUSCULAR; INTRAVENOUS at 13:12

## 2022-12-16 RX ADMIN — LIDOCAINE HYDROCHLORIDE 100 MG: 20 INJECTION, SOLUTION INFILTRATION; PERINEURAL at 13:09

## 2022-12-16 RX ADMIN — Medication 25 MG: at 13:09

## 2022-12-16 RX ADMIN — DEXAMETHASONE SODIUM PHOSPHATE 8 MG: 4 INJECTION, SOLUTION INTRAMUSCULAR; INTRAVENOUS at 13:17

## 2022-12-16 RX ADMIN — SODIUM CHLORIDE: 9 INJECTION, SOLUTION INTRAVENOUS at 12:13

## 2022-12-16 ASSESSMENT — PAIN - FUNCTIONAL ASSESSMENT: PAIN_FUNCTIONAL_ASSESSMENT: 0-10

## 2022-12-16 ASSESSMENT — LIFESTYLE VARIABLES: SMOKING_STATUS: 1

## 2022-12-16 NOTE — PROGRESS NOTES
Pt arrived from OR to PACU bay 4. Report received from OR staff. Pt asleep, minimally arousable to voice. Surgical incisions dressings in place to lower back. Pt arrives with nasal cannula in place, infusing 2L oxygen, vitals as charted.

## 2022-12-16 NOTE — PROGRESS NOTES
Pt awake and alert at this time. Pt on RA, and VSS. Pt denies pain and nausea, tolerating PO. Skin warm to surrounding surgical area. . Pt meets criteria to be discharged from Phase 1.

## 2022-12-16 NOTE — DISCHARGE INSTRUCTIONS
DISCHARGE INSTRUCTIONS for DR. PAYNE    You may be drowsy or lightheaded after receiving sedation or anesthesia. Do not sign any legal documents or make any legal decisions for 24 hours. Do not drink alcohol for 24 hours or while taking narcotic pain medicaiton. A responsible person should be with you for the next 24 hours. Please follow the instructions checked below:    DIET INSTRUCTIONS:  Start with light diet and progress to your normal diet as you feel like eating. If you experience nausea or repeated episodes of vomiting which persist beyond 12-24 hours, notify your doctor. ACTIVITY INSTRUCTIONS:  Rest today. Increase activity as tolerated    No heavy lifting or strenuous activity     No driving for 24 hours or while taking pain medicines  No Bending, Lifting, Twisting. WOUND/DRESSING INSTRUCTIONS:       Always ensure you and your care giver clean hands before and after caring for the wound. Sponge bathing until cleared by Dr. Lucien Rangel not remove dressing  Ice to operative site for 15-30 minutes of each hour while awake for 24-36 hours                                                                                                                                       MEDICATION INSTRUCTIONS:  Resume home medications. Use as directed. When taking pain medications, you may experience dizziness or drowsiness. Do not drink alcohol or drive when taking these medications. You may resume your pain medication as directed per Dr. Keisha Melchor. When taking pain medications, you may experience dizziness or drowsiness. Do not drink alcohol or drive when taking these medications. You may take a non-prescription headache remedy, preferably one that does not contain aspirin. Give the list of your medications to your primary care physician on your next visit. Keep your med list updated and carry it with in case of emergencies.     FOLLOW-UP CARE:  Call the office at [phone]  for follow-up appointment as scheduled    Watch for these significant complications. Call physician if they or any other problems occur:  Fever over 101°    Redness, swelling, hardness or warmth at the operative site  Unrelieved nausea    Foul smelling or cloudy drainage at the operative site   Unrelieved pain    Blood soaked dressing. (Some oozing may be normal)  Inability to urinate      Numb, pale, blue, cold or tingling extremity    Physician             Dr. Jasper Velazquez            [phone]    The above instructions were reviewed with patient/significant other. Copy given to patient/significant other. ANESTHESIA DISCHARGE INSTRUCTIONS    Wear your seatbelt home. You are under the influence of drugs-do not drink alcohol,drive,operate machinery,or make any important decisions or sign any legal documentsfor 24 hours  A responsible adult needs to be with you for 24 hours. You may experience lightheadedness,dizziness,or sleepiness following surgery. Rest at home today- increase activity as tolerated. Progress slowly to a regular diet unless your physician has instructed you otherwise. Drink plenty of water. If nausea becomes a problem call your physician. Coughing,sore throat,and muscle aches are other side effects of anesthesia,and should improve with time. Do not drive,operate machinery while taking narcotics.

## 2022-12-16 NOTE — PROGRESS NOTES
Dr Beth Pablo & Dr Freeman Innocent team aware patient has dime size or smaller red itchy blotches on head (2 near R eye), upper back, 1 @ low back, multiple on arms &  about 5 @ L ankle & stated takes benadryl PRN which aids he stated & it started least 1 wk ago & unsure of cause; Dr Dean Em requested pt/wife f/u Dr Zak Crowder, his family dr regarding this & verbalized understanding

## 2022-12-16 NOTE — ANESTHESIA POSTPROCEDURE EVALUATION
Department of Anesthesiology  Postprocedure Note    Patient: Eze Aguirre  MRN: [id number]  YOB: 1945  Date of evaluation: 12/16/2022      Procedure Summary     Date: 12/16/22 Room / Location: [address]    Anesthesia Start: 6143 Anesthesia Stop:     Procedure: BILATERAL L3-4, L4-5 MINIMALLY INVASIVE LUMBAR DECOMPRESSION-VERTOS (Bilateral: Spine Lumbar) Diagnosis:       Spinal stenosis of lumbar region with neurogenic claudication      (Spinal stenosis of lumbar region with neurogenic claudication [M48.062])    Surgeons: Se Anaya MD Responsible Provider: Willie Centeno MD    Anesthesia Type: general, MAC ASA Status: 3          Anesthesia Type: No value filed.     Maria T Phase I: Maria T Score: 7    Maria T Phase II:        Anesthesia Post Evaluation    Patient location during evaluation: PACU  Patient participation: complete - patient participated  Level of consciousness: awake  Airway patency: patent  Nausea & Vomiting: no vomiting and no nausea  Complications: no  Cardiovascular status: hemodynamically stable  Respiratory status: acceptable  Hydration status: stable  Multimodal analgesia pain management approach

## 2022-12-16 NOTE — ANESTHESIA PRE PROCEDURE
Department of Anesthesiology  Preprocedure Note       Name:  Katalina Saucedo   Age:  69 y.o.  :  1945                                          MRN:  [id number]         Date:  2022      Surgeon: Tyesha Castillo):  Erik Sena MD    Procedure:     Medications prior to admission:   Prior to Admission medications    Medication Sig Start Date End Date Taking? Authorizing Provider   buprenorphine (BUTRANS) 15 MCG/HR PTWK Place 1 patch onto the skin once a week for 30 days. 22  Adrian Martínez MD   brexpiprazole (REXULTI) 3 MG TABS tablet Take 1 tablet by mouth daily 10/20/22   Adrian Martínez MD   venlafaxine (EFFEXOR XR) 75 MG extended release capsule Take 2 capsules by mouth daily 10/20/22   Adrian Martínez MD   metoprolol succinate (TOPROL XL) 25 MG extended release tablet Take 1 tablet by mouth daily 10/17/22 11/16/22  Adrian Martínez MD   OLANZapine (ZYPREXA) 5 MG tablet Take 1 tablet by mouth nightly 10/17/22 11/16/22  Adrian Martínez MD   ibuprofen (ADVIL;MOTRIN) 400 MG tablet Take 1 tablet by mouth every 8 hours as needed for Pain 9/15/22 12/14/22  Adrian Martínez MD   XARELTO 20 MG TABS tablet TAKE ONE TABLET BY MOUTH DAILY WITH BREAKFAST 22   TIMOTHY Nash - CNP   ondansetron (ZOFRAN-ODT) 4 MG disintegrating tablet Take 1 tablet by mouth every 8 hours as needed for Nausea or Vomiting 22   Adrian Martínez MD   atorvastatin (LIPITOR) 10 MG tablet Take 0.5 tablets by mouth daily 7/14/22 10/12/22  Adrian Martínez MD   gabapentin (NEURONTIN) 300 MG capsule Take 1 capsule by mouth 2 times daily for 90 days.  7/14/22 10/12/22  Adrian Martínez MD   butalbital-acetaminophen-caffeine (FIORICET, ESGIC) -42 MG per tablet Take 1 tablet by mouth every 6 hours as needed for Headaches 3/8/22 4/7/22  Adrian Martínez MD   fluticasone (FLONASE) 50 MCG/ACT nasal spray 1 spray by Each Nostril route daily  Patient not taking: No sig reported 22   Kiki Martin MD ipratropium (ATROVENT) 0.03 % nasal spray 2 sprays by Each Nostril route daily  Patient not taking: No sig reported 2/16/22   Barb Barrera MD       Current medications:    No current facility-administered medications for this visit. No current outpatient medications on file. Facility-Administered Medications Ordered in Other Visits   Medication Dose Route Frequency Provider Last Rate Last Admin    ceFAZolin (ANCEF) 2,000 mg in dextrose 5 % 50 mL IVPB (mini-bag)  2,000 mg IntraVENous On Call to [address] [place] [address] MD Jerica        lidocaine PF 1 % injection 0.5 mL  0.5 mL IntraDERmal Once Nidia Benavides MD        0.9 % sodium chloride infusion   IntraVENous Continuous Isiah Spence MD           Allergies:     Allergies   Allergen Reactions    Shellfish Allergy     Augmentin [Amoxicillin-Pot Clavulanate] Nausea And Vomiting       Problem List:    Patient Active Problem List   Diagnosis Code    Essential hypertension I10    Spinal stenosis, lumbar region with neurogenic claudication M48.062    Pure hypercholesterolemia E78.00    Peripheral neuropathy G62.9    Body mass index 28.0-28.9, adult Z68.28    DDD (degenerative disc disease), thoracolumbar M51.35    Tobacco abuse Z72.0    Chronic idiopathic constipation K59.04    Benign prostatic hyperplasia with urinary frequency N40.1, R35.0    Overactive bladder N32.81    Major depressive disorder F32.9    PAF (paroxysmal atrial fibrillation) (HCC) I48.0    Coronary artery disease due to lipid rich plaque I25.10, I25.83    Sciatica M54.30    GORDON (obstructive sleep apnea) G47.33    Nervous breakdown F48.8    Generalized anxiety disorder with panic attacks F41.1, F41.0    Hematuria R31.9    Obstructive sleep apnea (adult) (pediatric) G47.33    Central sleep apnea due to Cheyne-Anderson respiration R06.3       Past Medical History:        Diagnosis Date    [facility] ([facility]     Hypercholesteremia     Hypertension        Past Surgical History:        Procedure Laterality Date    COLONOSCOPY  05/24/2021    HEMORRHOID SURGERY      UROLIFT    SHOULDER SURGERY Right     RTC        Social History:    Social History     Tobacco Use    Smoking status: Every Day     Packs/day: 0.50     Years: 50.00     Pack years: 25.00     Types: Cigarettes    Smokeless tobacco: Former     Quit date: 1/1/2011   Substance Use Topics    Alcohol use: No                                Ready to quit: Not Answered  Counseling given: Not Answered      Vital Signs (Current): There were no vitals filed for this visit.                                            BP Readings from Last 3 Encounters:   11/17/22 122/80   10/17/22 102/78   10/03/22 115/76       NPO Status:                                                                                 BMI:   Wt Readings from Last 3 Encounters:   12/13/22 160 lb (72.6 kg)   11/17/22 170 lb (77.1 kg)   10/17/22 170 lb (77.1 kg)     There is no height or weight on file to calculate BMI.    CBC:   Lab Results   Component Value Date/Time    WBC 10.2 10/07/2022 02:30 PM    RBC 4.55 10/07/2022 02:30 PM    HGB 14.2 10/07/2022 02:30 PM    HCT 41.4 10/07/2022 02:30 PM    MCV 90.9 10/07/2022 02:30 PM    RDW 12.8 10/07/2022 02:30 PM     10/07/2022 02:30 PM       CMP:   Lab Results   Component Value Date/Time     10/07/2022 02:30 PM    K 4.6 10/07/2022 02:30 PM    K 4.5 07/04/2022 11:13 AM     10/07/2022 02:30 PM    CO2 26 10/07/2022 02:30 PM    BUN 26 11/25/2022 07:44 AM    CREATININE 1.2 11/25/2022 07:44 AM    GFRAA >60 10/07/2022 02:30 PM    AGRATIO 2.6 07/04/2022 11:13 AM    LABGLOM >60 11/25/2022 07:44 AM    GLUCOSE 85 10/07/2022 02:30 PM    PROT 6.5 07/04/2022 11:13 AM    CALCIUM 9.4 10/07/2022 02:30 PM    BILITOT 1.5 07/04/2022 11:13 AM    ALKPHOS 60 07/04/2022 11:13 AM    AST 13 07/04/2022 11:13 AM    ALT 13 07/04/2022 11:13 AM       POC Tests: No results for input(s): POCGLU, POCNA, POCK, POCCL, POCBUN, POCHEMO, POCHCT in the last 72 hours.    Coags:   Lab Results   Component Value Date/Time    PROTIME 20.7 07/04/2022 01:33 PM    INR 1.78 07/04/2022 01:33 PM    APTT 21.1 02/27/2021 11:00 AM       HCG (If Applicable): No results found for: PREGTESTUR, PREGSERUM, HCG, HCGQUANT     ABGs: No results found for: PHART, PO2ART, YCI7NYO, YIK6SNV, BEART, P1BBZHIU     Type & Screen (If Applicable):  No results found for: LABABO, LABRH    Drug/Infectious Status (If Applicable):  No results found for: HIV, HEPCAB    COVID-19 Screening (If Applicable):   Lab Results   Component Value Date/Time    COVID19 Not Detected 03/31/2021 12:00 PM           Anesthesia Evaluation  Patient summary reviewed and Nursing notes reviewed no history of anesthetic complications:   Airway: Mallampati: II  TM distance: >3 FB   Neck ROM: full  Mouth opening: > = 3 FB   Dental:    (+) upper dentures      Pulmonary:normal exam  breath sounds clear to auscultation  (+) sleep apnea: on CPAP,  current smoker    (-) COPD and asthma                           Cardiovascular:    (+) hypertension:, past MI (stress 9/20 Small sized infapic fixed defect of mod intensity c/w infarct, EF 47):, CAD (cath 9/20 nonobst dz, Ef 50, no RWMA):, dysrhythmias (PAF, on bb ad ac as outpt): atrial fibrillation, hyperlipidemia    (-) CABG/stent,  angina and  CHF (echo 9/20 Ef 56 no RWMA PASP 40-45)    ECG reviewed  Rhythm: irregular  Rate: normal  Echocardiogram reviewed  Stress test reviewed       Beta Blocker:  Dose within 24 Hrs         Neuro/Psych:   (+) neuromuscular disease (sciatica, peripheral neuropathy, lumbar ss):, depression/anxiety    (-) seizures, TIA and CVA           GI/Hepatic/Renal: Neg GI/Hepatic/Renal ROS       (-) GERD, liver disease and no renal disease       Endo/Other: Negative Endo/Other ROS       (-) diabetes mellitus, hypothyroidism, hyperthyroidism               Abdominal:             Vascular:           Other Findings:             Anesthesia Plan      general and MAC     ASA 3 Induction: intravenous. MIPS: Postoperative opioids intended and Prophylactic antiemetics administered. Anesthetic plan and risks discussed with patient. Plan discussed with CRNA.                     Vidal Schmidt MD   12/16/2022

## 2022-12-16 NOTE — PROGRESS NOTES
Discharge instructions review with patient and pt wife . Pt discharged via wheelchair. Pt discharged with instructions and all belongings. Wife taking stable pt home.

## 2022-12-20 NOTE — OP NOTE
Operative Note      Patient: Renetta Jackson  YOB: 1945  MRN: [id number]    Date of Procedure: 12/16/2022    Pre-Op Diagnosis: Spinal stenosis of lumbar region with neurogenic claudication [M48.062]    Post-Op Diagnosis: Same       Procedure(s):  BILATERAL L3-4, L4-5 MINIMALLY INVASIVE LUMBAR DECOMPRESSION-VERTOS    Surgeon(s):  Bridger Pardo MD    Assistant:   First Assistant: Janiya Sierra RN    Anesthesia: Monitor Anesthesia Care    Estimated Blood Loss (mL): Minimal    Complications: None    Specimens:   * No specimens in log *    Implants:  * No implants in log *      Drains: * No LDAs found *    Findings:     Detailed Description of Procedure:   INDICATIONS FOR PROCEDURE: The patient has a history of chronic pain that has been going on for years; her pump provided her with excellent relief as she failed oral therapy. The patient decided that she no longer want to have the pump and wanted it to be removed. INDICATIONS FOR PROCEDURE:  The patient's ID was confirmed, and a time-out was performed. Time, site, location, and procedure agreed upon and consented for were reviewed. The patient was radiologically confirmed to be suffering from clinically symptomatic lumbar spinal stenosis and confirmed neurogenic claudication. The patient's condition has progressed such that activities of daily living are limited due to pain intensity and neurogenic claudication symptomology. Standing time and walking distance have declined steadily. Patient's clinical notes for office visits show tried and failed conservative treatments, such as physical therapy, pharmaceutical management, or other treatments for chronic low back pain and neurogenic claudication. According to the treatment algorithm for lumbar spinal stenosis, the patient is a candidate for a lumbar decompression procedure.     The patient recalls our previous office discussion regarding the indications, risks, and potential benefits of the mild® procedure-- a percutaneous lumbar decompression. Informed consent was obtained, and the patient indicated understanding and agreed with the treatment proposed. PROCEDURE:  The patient was placed in the prone position in the fluoroscopy suite with a pillow (bolster) under the hips to assist with reversing lordosis of the spine. Following IV sedation and antibiotic administration, the patient was prepped and draped in my usual standard fashion. Ample amounts of local anesthetic were used to anesthetize the skin and deep facial planes after topographical landmarks were identified. Then, an L3-L4 epidurogram was performed under fluoroscopic guidance at the level of concern, by the insertion of a Tuohy needle and use of minimal amounts of myelographically compatible contrast, which showed decreased contrast flow past the levels of concern. An oblique contralateral view of the epidurogram was undertaken, which indicated lumbar spinal stenosis as shown by the decreased contrast flow. A small incision was made with a sharp scalpel blade, and a trocar with portal was inserted percutaneously under fluoroscopic guidance to contact the lamina indicated. A bone-sculpting rongeur was inserted to remove adequate amounts of lamina (laminotomy) from the superior surface of the inferior operative lamina site and from the inferior aspect of the lamina above it. The laminotomy created a passage for the tissue sculpting instrument used in debulking the ligamentum flavum. The ligamentum flavum was debulked in such a way that the epidural contrast, upon repeat of the epidurogram, showed improved flow. A similar procedure was performed at the bilateral L4-L5. There were no complications or difficulties noted with these procedures.     Using the same incisions on the right and the left side I repositioned my trocar under fluoroscopy guidance to contact the lamina L4 on the left, and repeated the same steps on the left side and the right side. The epidurogram was refreshed repeatedly throughout the procedure and demonstrated improvement in the original narrowed canal. Upon completion of the procedure, instruments were removed, hemostasis was achieved by direct pressure, and closure of the wounds were performed. Appropriate dressings were used. The patient tolerated the procedure well. Upon transferring the patient to the recovery room, the patient's vital signs remained stable and without any neurologic deficits. A brief neurological exam was performed and showed adequate strength and no loss of sensation in the lower extremities. The patient was discharged with instructions to rest, continue with ice, and to demonstrate progressive mobility. The patient was given a follow-up exam time and date along with instructions and contact information for any questions or concerns. The patient is to be followed up in the office for further evaluation and treatment as deemed appropriate and necessary and for any concerns regarding the procedure. CONCLUSION:  A successful fluoroscopically guided percutaneous Bilateral L4-L5 and bilateral L3-L4 lumbar decompression was undertaken at the indicated levels above with IV sedation and adequate local anesthesia. The patient was neurologically intact without complications, freely able to move and respond to commands with IV sedation, and able to move lower extremities. Movement of toes, feet, knees, and hips were reviewed prior to being discharged from the procedure room and the recovery room, as indicated. There was no new loss of sensation over the lower extremities.     Electronically signed by Pb Tyson MD on 12/20/2022 at 3:24 PM

## 2022-12-23 ENCOUNTER — TELEPHONE (OUTPATIENT)
Dept: CASE MANAGEMENT | Age: 77
End: 2022-12-23

## 2022-12-23 NOTE — TELEPHONE ENCOUNTER
Dr Moe Sykes ordered a CT chest/abd/pelvis done on 12/13 w/ following results  9 mm spiculated left upper lobe pulmonary nodule, stable since prior   examination. No new or enlarging pulmonary nodule. Bilateral axillary, retroperitoneal and iliac chain lymphadenopathy, stable   since prior PET-CT. These demonstrated no significant FDG avidity on prior   PET-CT. Indolent lymphoma is still in the differential.   He is scheduled for a repeat CT Chest 2/1/23 ordered by you.     Thank you,  Gurwinder Cheatham RN  Lung Navigator  [address], [address] [place] [address]  [phone]

## 2023-01-19 PROBLEM — F32.1 CURRENT MODERATE EPISODE OF MAJOR DEPRESSIVE DISORDER WITHOUT PRIOR EPISODE (HCC): Status: ACTIVE | Noted: 2023-01-19

## 2023-02-16 ENCOUNTER — HOSPITAL ENCOUNTER (OUTPATIENT)
Age: 78
Discharge: HOME | End: 2023-02-16
Payer: MEDICARE

## 2023-02-16 ENCOUNTER — APPOINTMENT (OUTPATIENT)
Dept: CT IMAGING | Age: 78
End: 2023-02-16
Payer: MEDICARE

## 2023-02-16 DIAGNOSIS — Z79.01 ON CONTINUOUS ORAL ANTICOAGULATION: ICD-10-CM

## 2023-02-16 LAB
INR BLD: 1.67 (ref 0.87–1.14)
PROTHROMBIN TIME: 19.7 SEC (ref 11.7–14.5)

## 2023-02-16 PROCEDURE — 85610 PROTHROMBIN TIME: CPT

## 2023-02-16 PROCEDURE — 36415 COLL VENOUS BLD VENIPUNCTURE: CPT

## 2023-03-02 ENCOUNTER — TELEPHONE (OUTPATIENT)
Dept: PULMONOLOGY | Age: 78
End: 2023-03-02

## 2023-03-02 DIAGNOSIS — R91.1 LUNG NODULE: Primary | ICD-10-CM

## 2023-03-02 DIAGNOSIS — Z72.0 TOBACCO ABUSE: ICD-10-CM

## 2023-03-02 NOTE — TELEPHONE ENCOUNTER
Pt was due for repeat CT Chest 2-1-23 and they went to have this done and told there was no order ?  If [t can have new order tp get thi done  Radha Numbers # is [phone]

## 2023-03-22 ENCOUNTER — HOSPITAL ENCOUNTER (OUTPATIENT)
Dept: CT IMAGING | Age: 78
Discharge: HOME | End: 2023-03-22
Payer: MEDICARE

## 2023-03-22 DIAGNOSIS — Z72.0 TOBACCO ABUSE: ICD-10-CM

## 2023-03-22 DIAGNOSIS — R91.1 LUNG NODULE: ICD-10-CM

## 2023-03-22 PROCEDURE — 71250 CT THORAX DX C-: CPT

## 2023-03-23 ENCOUNTER — TELEPHONE (OUTPATIENT)
Dept: PULMONOLOGY | Age: 78
End: 2023-03-23

## 2023-03-23 NOTE — TELEPHONE ENCOUNTER
Left message on pt's cell phone VM that report is not final yet but will forward a message to Dr. Buddy Bowen stating patient would like to know results once report is final.    Dr. Buddy Bowen, please advise

## 2023-03-23 NOTE — TELEPHONE ENCOUNTER
Patient had a CT done on 3/22/23 and would like results     [facility]: [phone]

## 2023-03-27 DIAGNOSIS — R59.0 AXILLARY LYMPHADENOPATHY: Primary | ICD-10-CM

## 2023-03-29 ENCOUNTER — OFFICE VISIT (OUTPATIENT)
Dept: SURGERY | Age: 78
End: 2023-03-29
Payer: MEDICARE

## 2023-03-29 VITALS — DIASTOLIC BLOOD PRESSURE: 61 MMHG | WEIGHT: 174 LBS | BODY MASS INDEX: 25.7 KG/M2 | SYSTOLIC BLOOD PRESSURE: 113 MMHG

## 2023-03-29 DIAGNOSIS — R59.9 ADENOPATHY: Primary | ICD-10-CM

## 2023-03-29 PROCEDURE — 99202 OFFICE O/P NEW SF 15 MIN: CPT | Performed by: SURGERY

## 2023-03-29 PROCEDURE — 1123F ACP DISCUSS/DSCN MKR DOCD: CPT | Performed by: SURGERY

## 2023-03-29 PROCEDURE — 3078F DIAST BP <80 MM HG: CPT | Performed by: SURGERY

## 2023-03-29 PROCEDURE — 3074F SYST BP LT 130 MM HG: CPT | Performed by: SURGERY

## 2023-03-29 ASSESSMENT — ENCOUNTER SYMPTOMS
APNEA: 0
ABDOMINAL PAIN: 0
EYE DISCHARGE: 0
ABDOMINAL DISTENTION: 0
CHEST TIGHTNESS: 0
EYE ITCHING: 0
BACK PAIN: 0
COLOR CHANGE: 0

## 2023-03-29 NOTE — PROGRESS NOTES
Yes Shelbi Cuevas MD   clonazePAM (KLONOPIN PO) Take 0.5 mg by mouth 2 times daily Yes Historical Provider, MD   diphenhydrAMINE (BENADRYL) 25 MG capsule Take 50 mg by mouth every 6 hours as needed for Itching Yes Historical Provider, MD   ondansetron (ZOFRAN-ODT) 4 MG disintegrating tablet Take 1 tablet by mouth every 8 hours as needed for Nausea or Vomiting Yes Shelbi Cuevas MD   ipratropium (ATROVENT) 0.03 % nasal spray 2 sprays by Each Nostril route daily Yes Stanford Eldridge MD   venlafaxine (EFFEXOR XR) 150 MG extended release capsule Take 1 capsule by mouth daily  Shelbi Cuevas MD       Social History     Socioeconomic History    Marital status:      Spouse name: Not on file    Number of children: 2    Years of education: Not on file    Highest education level: Not on file   Occupational History    Occupation: Retired   Tobacco Use    Smoking status: Every Day     Packs/day: 0.75     Years: 50.00     Pack years: 37.50     Types: Cigarettes    Smokeless tobacco: Former     Quit date: 1/1/2011    Tobacco comments:     HX 1ppd-now 1/2 ppd   Vaping Use    Vaping Use: Never used   Substance and Sexual Activity    Alcohol use: No    Drug use: No    Sexual activity: Not Currently   Other Topics Concern    Not on file   Social History Narrative    Not on file     Social Determinants of Health     Financial Resource Strain: Not on file   Food Insecurity: Not on file   Transportation Needs: Not on file   Physical Activity: Inactive    Days of Exercise per Week: 0 days    Minutes of Exercise per Session: 0 min   Stress: Not on file   Social Connections: Not on file   Intimate Partner Violence: Not on file   Housing Stability: Not on file       Review of Systems   Constitutional:  Negative for activity change and appetite change. HENT:  Negative for congestion and dental problem. Eyes:  Negative for discharge and itching. Respiratory:  Negative for apnea and chest tightness.     Cardiovascular:

## 2023-03-29 NOTE — LETTER
[facility] [address] [address]  Phone: [phone]  Fax: [phone]    March 30, 2023    Patient: Car Ruelas  MRN:  [id number]  YOB: 1945  Date of Visit: 3/29/2023    Dear Dr Fierro Rings: Thank you for the request for consultation for Tunde Abdalla. Below are the relevant portions of my assessment and plan of care. Assessment:  80-year-old male recent 40 pound unexpected weight loss who was referred for possible right axillary lymph node biopsy. He has had no fevers or night sweats. Physical examination reveals bilateral axillary adenopathy. The adenopathy is also seen on CAT scan of the chest from 3/22/2023. Plan:  Excisional biopsy of enlarged right axillary lymph node. The patient is on Xarelto for atrial fibrillation. We will check his cardiologist, Dr. Deyanira Newsome, about holding the Xarelto preoperatively. If you have questions, please do not hesitate to call me. I look forward to following Karolina Garcia along with you.     Sincerely,    Nini Andrade MD     providers:    Sam Burgos MD  [address] [place] [id number]  Via Fax: [phone]     Arturo Mina MD  [address] [place] [address] [address] [place] [id number]  Via Fax: [phone]

## 2023-03-30 ENCOUNTER — TELEPHONE (OUTPATIENT)
Dept: SURGERY | Age: 78
End: 2023-03-30

## 2023-03-30 NOTE — TELEPHONE ENCOUNTER
Per [facility] at Dr Valencia's office it's ok for pt to hold xarelto 2 days prior to surgery

## 2023-04-04 ENCOUNTER — OFFICE VISIT (OUTPATIENT)
Dept: ENT CLINIC | Age: 78
End: 2023-04-04
Payer: MEDICARE

## 2023-04-04 VITALS
TEMPERATURE: 97.3 F | BODY MASS INDEX: 25.62 KG/M2 | WEIGHT: 173 LBS | OXYGEN SATURATION: 94 % | DIASTOLIC BLOOD PRESSURE: 77 MMHG | HEIGHT: 69 IN | HEART RATE: 74 BPM | SYSTOLIC BLOOD PRESSURE: 126 MMHG

## 2023-04-04 DIAGNOSIS — H61.21 IMPACTED CERUMEN OF RIGHT EAR: ICD-10-CM

## 2023-04-04 DIAGNOSIS — K11.9 LESION OF PAROTID GLAND: Primary | ICD-10-CM

## 2023-04-04 DIAGNOSIS — J38.7 VALLECULAR CYST: ICD-10-CM

## 2023-04-04 PROCEDURE — 3074F SYST BP LT 130 MM HG: CPT | Performed by: STUDENT IN AN ORGANIZED HEALTH CARE EDUCATION/TRAINING PROGRAM

## 2023-04-04 PROCEDURE — 99213 OFFICE O/P EST LOW 20 MIN: CPT | Performed by: STUDENT IN AN ORGANIZED HEALTH CARE EDUCATION/TRAINING PROGRAM

## 2023-04-04 PROCEDURE — 1123F ACP DISCUSS/DSCN MKR DOCD: CPT | Performed by: STUDENT IN AN ORGANIZED HEALTH CARE EDUCATION/TRAINING PROGRAM

## 2023-04-04 PROCEDURE — 69210 REMOVE IMPACTED EAR WAX UNI: CPT | Performed by: STUDENT IN AN ORGANIZED HEALTH CARE EDUCATION/TRAINING PROGRAM

## 2023-04-04 PROCEDURE — 3078F DIAST BP <80 MM HG: CPT | Performed by: STUDENT IN AN ORGANIZED HEALTH CARE EDUCATION/TRAINING PROGRAM

## 2023-04-04 PROCEDURE — 31575 DIAGNOSTIC LARYNGOSCOPY: CPT | Performed by: STUDENT IN AN ORGANIZED HEALTH CARE EDUCATION/TRAINING PROGRAM

## 2023-04-04 NOTE — PROGRESS NOTES
tests  Review / order radiology tests  Decision to obtain old records      This note was generated completely or in part utilizing Dragon dictation speech recognition software. Occasionally, words are mistranscribed and despite editing, the text may contain inaccuracies due to incorrect word recognition. If further clarification is needed please contact the office at [phone].

## 2023-04-19 ENCOUNTER — ANESTHESIA (OUTPATIENT)
Dept: OPERATING ROOM | Age: 78
End: 2023-04-19
Payer: MEDICARE

## 2023-04-19 ENCOUNTER — HOSPITAL ENCOUNTER (OUTPATIENT)
Age: 78
Setting detail: OUTPATIENT SURGERY
Discharge: HOME | End: 2023-04-19
Attending: SURGERY | Admitting: SURGERY
Payer: MEDICARE

## 2023-04-19 ENCOUNTER — ANESTHESIA EVENT (OUTPATIENT)
Dept: OPERATING ROOM | Age: 78
End: 2023-04-19
Payer: MEDICARE

## 2023-04-19 VITALS
OXYGEN SATURATION: 93 % | TEMPERATURE: 97.5 F | BODY MASS INDEX: 25.55 KG/M2 | RESPIRATION RATE: 21 BRPM | SYSTOLIC BLOOD PRESSURE: 127 MMHG | DIASTOLIC BLOOD PRESSURE: 72 MMHG | HEART RATE: 76 BPM | WEIGHT: 173 LBS

## 2023-04-19 DIAGNOSIS — R59.0 ENLARGED LYMPH NODES IN ARMPIT: ICD-10-CM

## 2023-04-19 PROCEDURE — 3700000000 HC ANESTHESIA ATTENDED CARE: Performed by: SURGERY

## 2023-04-19 PROCEDURE — 2580000003 HC RX 258: Performed by: SURGERY

## 2023-04-19 PROCEDURE — 88342 IMHCHEM/IMCYTCHM 1ST ANTB: CPT

## 2023-04-19 PROCEDURE — 7100000001 HC PACU RECOVERY - ADDTL 15 MIN: Performed by: SURGERY

## 2023-04-19 PROCEDURE — 3600000002 HC SURGERY LEVEL 2 BASE: Performed by: SURGERY

## 2023-04-19 PROCEDURE — 2709999900 HC NON-CHARGEABLE SUPPLY: Performed by: SURGERY

## 2023-04-19 PROCEDURE — 7100000010 HC PHASE II RECOVERY - FIRST 15 MIN: Performed by: SURGERY

## 2023-04-19 PROCEDURE — 88184 FLOWCYTOMETRY/ TC 1 MARKER: CPT

## 2023-04-19 PROCEDURE — 3700000001 HC ADD 15 MINUTES (ANESTHESIA): Performed by: SURGERY

## 2023-04-19 PROCEDURE — 7100000000 HC PACU RECOVERY - FIRST 15 MIN: Performed by: SURGERY

## 2023-04-19 PROCEDURE — A4217 STERILE WATER/SALINE, 500 ML: HCPCS | Performed by: SURGERY

## 2023-04-19 PROCEDURE — 7100000011 HC PHASE II RECOVERY - ADDTL 15 MIN: Performed by: SURGERY

## 2023-04-19 PROCEDURE — 6360000002 HC RX W HCPCS: Performed by: SURGERY

## 2023-04-19 PROCEDURE — 2580000003 HC RX 258: Performed by: NURSE ANESTHETIST, CERTIFIED REGISTERED

## 2023-04-19 PROCEDURE — 6360000002 HC RX W HCPCS: Performed by: NURSE ANESTHETIST, CERTIFIED REGISTERED

## 2023-04-19 PROCEDURE — 88305 TISSUE EXAM BY PATHOLOGIST: CPT

## 2023-04-19 PROCEDURE — 3600000012 HC SURGERY LEVEL 2 ADDTL 15MIN: Performed by: SURGERY

## 2023-04-19 PROCEDURE — 2500000003 HC RX 250 WO HCPCS: Performed by: NURSE ANESTHETIST, CERTIFIED REGISTERED

## 2023-04-19 PROCEDURE — 88185 FLOWCYTOMETRY/TC ADD-ON: CPT

## 2023-04-19 RX ORDER — SODIUM CHLORIDE 0.9 % (FLUSH) 0.9 %
5-40 SYRINGE (ML) INJECTION PRN
Status: CANCELLED | OUTPATIENT
Start: 2023-04-19

## 2023-04-19 RX ORDER — SODIUM CHLORIDE 9 MG/ML
25 INJECTION, SOLUTION INTRAVENOUS PRN
Status: DISCONTINUED | OUTPATIENT
Start: 2023-04-19 | End: 2023-04-19 | Stop reason: HOSPADM

## 2023-04-19 RX ORDER — OXYCODONE HYDROCHLORIDE 5 MG/1
5 TABLET ORAL
Status: DISCONTINUED | OUTPATIENT
Start: 2023-04-19 | End: 2023-04-19 | Stop reason: HOSPADM

## 2023-04-19 RX ORDER — PROPOFOL 10 MG/ML
INJECTION, EMULSION INTRAVENOUS PRN
Status: DISCONTINUED | OUTPATIENT
Start: 2023-04-19 | End: 2023-04-19 | Stop reason: SDUPTHER

## 2023-04-19 RX ORDER — HYDROCODONE BITARTRATE AND ACETAMINOPHEN 5; 325 MG/1; MG/1
1-2 TABLET ORAL EVERY 4 HOURS PRN
Qty: 15 TABLET | Refills: 0 | Status: SHIPPED | OUTPATIENT
Start: 2023-04-19 | End: 2023-04-22

## 2023-04-19 RX ORDER — SODIUM CHLORIDE 0.9 % (FLUSH) 0.9 %
5-40 SYRINGE (ML) INJECTION EVERY 12 HOURS SCHEDULED
Status: DISCONTINUED | OUTPATIENT
Start: 2023-04-19 | End: 2023-04-19 | Stop reason: HOSPADM

## 2023-04-19 RX ORDER — SODIUM CHLORIDE 9 MG/ML
INJECTION, SOLUTION INTRAVENOUS PRN
Status: CANCELLED | OUTPATIENT
Start: 2023-04-19

## 2023-04-19 RX ORDER — FENTANYL CITRATE 50 UG/ML
25 INJECTION, SOLUTION INTRAMUSCULAR; INTRAVENOUS EVERY 5 MIN PRN
Status: DISCONTINUED | OUTPATIENT
Start: 2023-04-19 | End: 2023-04-19 | Stop reason: HOSPADM

## 2023-04-19 RX ORDER — LIDOCAINE HYDROCHLORIDE 20 MG/ML
INJECTION, SOLUTION EPIDURAL; INFILTRATION; INTRACAUDAL; PERINEURAL PRN
Status: DISCONTINUED | OUTPATIENT
Start: 2023-04-19 | End: 2023-04-19 | Stop reason: SDUPTHER

## 2023-04-19 RX ORDER — SODIUM CHLORIDE 0.9 % (FLUSH) 0.9 %
5-40 SYRINGE (ML) INJECTION EVERY 12 HOURS SCHEDULED
Status: CANCELLED | OUTPATIENT
Start: 2023-04-19

## 2023-04-19 RX ORDER — BUPIVACAINE HYDROCHLORIDE AND EPINEPHRINE 5; 5 MG/ML; UG/ML
INJECTION, SOLUTION PERINEURAL
Status: COMPLETED | OUTPATIENT
Start: 2023-04-19 | End: 2023-04-19

## 2023-04-19 RX ORDER — MAGNESIUM HYDROXIDE 1200 MG/15ML
LIQUID ORAL CONTINUOUS PRN
Status: COMPLETED | OUTPATIENT
Start: 2023-04-19 | End: 2023-04-19

## 2023-04-19 RX ORDER — LIDOCAINE HYDROCHLORIDE 10 MG/ML
1 INJECTION, SOLUTION EPIDURAL; INFILTRATION; INTRACAUDAL; PERINEURAL
Status: CANCELLED | OUTPATIENT
Start: 2023-04-19 | End: 2023-04-20

## 2023-04-19 RX ORDER — SODIUM CHLORIDE 0.9 % (FLUSH) 0.9 %
5-40 SYRINGE (ML) INJECTION PRN
Status: DISCONTINUED | OUTPATIENT
Start: 2023-04-19 | End: 2023-04-19 | Stop reason: HOSPADM

## 2023-04-19 RX ORDER — ONDANSETRON 2 MG/ML
INJECTION INTRAMUSCULAR; INTRAVENOUS PRN
Status: DISCONTINUED | OUTPATIENT
Start: 2023-04-19 | End: 2023-04-19 | Stop reason: SDUPTHER

## 2023-04-19 RX ORDER — HYDRALAZINE HYDROCHLORIDE 20 MG/ML
10 INJECTION INTRAMUSCULAR; INTRAVENOUS
Status: DISCONTINUED | OUTPATIENT
Start: 2023-04-19 | End: 2023-04-19 | Stop reason: HOSPADM

## 2023-04-19 RX ORDER — HYDROMORPHONE HCL 110MG/55ML
0.25 PATIENT CONTROLLED ANALGESIA SYRINGE INTRAVENOUS EVERY 5 MIN PRN
Status: DISCONTINUED | OUTPATIENT
Start: 2023-04-19 | End: 2023-04-19 | Stop reason: HOSPADM

## 2023-04-19 RX ORDER — PHENYLEPHRINE HCL IN 0.9% NACL 1 MG/10 ML
SYRINGE (ML) INTRAVENOUS PRN
Status: DISCONTINUED | OUTPATIENT
Start: 2023-04-19 | End: 2023-04-19 | Stop reason: SDUPTHER

## 2023-04-19 RX ORDER — ONDANSETRON 2 MG/ML
4 INJECTION INTRAMUSCULAR; INTRAVENOUS
Status: DISCONTINUED | OUTPATIENT
Start: 2023-04-19 | End: 2023-04-19 | Stop reason: HOSPADM

## 2023-04-19 RX ORDER — FENTANYL CITRATE 50 UG/ML
INJECTION, SOLUTION INTRAMUSCULAR; INTRAVENOUS PRN
Status: DISCONTINUED | OUTPATIENT
Start: 2023-04-19 | End: 2023-04-19 | Stop reason: SDUPTHER

## 2023-04-19 RX ORDER — LABETALOL HYDROCHLORIDE 5 MG/ML
10 INJECTION, SOLUTION INTRAVENOUS
Status: DISCONTINUED | OUTPATIENT
Start: 2023-04-19 | End: 2023-04-19 | Stop reason: HOSPADM

## 2023-04-19 RX ORDER — SODIUM CHLORIDE 9 MG/ML
INJECTION, SOLUTION INTRAVENOUS CONTINUOUS PRN
Status: DISCONTINUED | OUTPATIENT
Start: 2023-04-19 | End: 2023-04-19 | Stop reason: SDUPTHER

## 2023-04-19 RX ORDER — PROCHLORPERAZINE EDISYLATE 5 MG/ML
5 INJECTION INTRAMUSCULAR; INTRAVENOUS
Status: DISCONTINUED | OUTPATIENT
Start: 2023-04-19 | End: 2023-04-19 | Stop reason: HOSPADM

## 2023-04-19 RX ORDER — DEXAMETHASONE SODIUM PHOSPHATE 4 MG/ML
INJECTION, SOLUTION INTRA-ARTICULAR; INTRALESIONAL; INTRAMUSCULAR; INTRAVENOUS; SOFT TISSUE PRN
Status: DISCONTINUED | OUTPATIENT
Start: 2023-04-19 | End: 2023-04-19 | Stop reason: SDUPTHER

## 2023-04-19 RX ADMIN — FENTANYL CITRATE 25 MCG: 50 INJECTION, SOLUTION INTRAMUSCULAR; INTRAVENOUS at 09:25

## 2023-04-19 RX ADMIN — CEFAZOLIN 2000 MG: 2 INJECTION, POWDER, FOR SOLUTION INTRAMUSCULAR; INTRAVENOUS at 08:53

## 2023-04-19 RX ADMIN — PROPOFOL 100 MG: 10 INJECTION, EMULSION INTRAVENOUS at 08:59

## 2023-04-19 RX ADMIN — SODIUM CHLORIDE: 9 INJECTION, SOLUTION INTRAVENOUS at 08:53

## 2023-04-19 RX ADMIN — FENTANYL CITRATE 50 MCG: 50 INJECTION, SOLUTION INTRAMUSCULAR; INTRAVENOUS at 08:59

## 2023-04-19 RX ADMIN — Medication 100 MCG: at 09:10

## 2023-04-19 RX ADMIN — LIDOCAINE HYDROCHLORIDE 60 MG: 20 INJECTION, SOLUTION EPIDURAL; INFILTRATION; INTRACAUDAL; PERINEURAL at 08:59

## 2023-04-19 RX ADMIN — ONDANSETRON 4 MG: 2 INJECTION INTRAMUSCULAR; INTRAVENOUS at 09:03

## 2023-04-19 RX ADMIN — Medication 100 MCG: at 09:03

## 2023-04-19 RX ADMIN — FENTANYL CITRATE 25 MCG: 50 INJECTION, SOLUTION INTRAMUSCULAR; INTRAVENOUS at 09:18

## 2023-04-19 RX ADMIN — DEXAMETHASONE SODIUM PHOSPHATE 8 MG: 4 INJECTION, SOLUTION INTRAMUSCULAR; INTRAVENOUS at 09:03

## 2023-04-19 RX ADMIN — PROPOFOL 30 MG: 10 INJECTION, EMULSION INTRAVENOUS at 09:00

## 2023-04-19 ASSESSMENT — LIFESTYLE VARIABLES: SMOKING_STATUS: 1

## 2023-04-19 ASSESSMENT — PAIN - FUNCTIONAL ASSESSMENT: PAIN_FUNCTIONAL_ASSESSMENT: 0-10

## 2023-04-19 NOTE — H&P
[facility] 84 and Laparoscopic Surgery  History and Physical Update          I have reviewed the history and physical and examined the patient and find no relevant changes. I have reviewed with the patient and/or family the risks, benefits, and alternatives to the procedure.     BP (!) 146/79   Pulse 75   Temp 97.6 °F (36.4 °C) (Temporal)   Resp 15   Wt 173 lb (78.5 kg)   SpO2 99%   BMI 25.55 kg/m²     Thomas Argueta MD  4/19/2023

## 2023-04-19 NOTE — PROGRESS NOTES
Meets criteria for phase 1, seen by anesthesia, ok for phase 2
Patient d/c'd to home with friend via w/c to car per RN, stable condition.
Patient denies pain, states he is ready for d.c    Discharge instructions reviewed and understanding verbalized per pt/family with copy given. All home medications/new prescriptions have been reviewed, questions answered and patient/family state understanding.  Medication information sheet provided for new prescriptions received when applicable
Patient responds to verbal stimuli, oral airway removed.  VSS
Patient/report received from OR to PACU in stable condition. VSS. Drsg to right axillae c/d/I.  RR easy and unlabored on simple mask and oral airway in place.
Teaching / education initiated regarding perioperative experience, expectations, and pain management during stay. Patient verbalized understanding.
yourself home. It is strongly suggested someone stay with you the first 24 hrs. Your surgery will be cancelled if you do not have a ride home. 8. A parent/legal guardian must accompany a child scheduled for surgery and plan to stay at the hospital until the child is discharged. Please do not bring other children with you. 9. Please wear simple, loose fitting clothing to the hospital.  Tyrone Dawson not bring valuables (money, credit cards, checkbooks, etc.) Do not wear any makeup (including no eye makeup) or nail polish on your fingers or toes. 10. DO NOT wear any jewelry or piercings on day of surgery. All body piercing jewelry must be removed. 11. If you have _x__dentures, they will be removed before going to the OR; we will provide you a container. If you wear ___contact lenses or ___glasses, they will be removed; please bring a case for them. 12. Please see your family doctor/pediatrician for a history & physical and/or concerning medications. Bring any test results/reports from your physician's office. PCP__________________Phone___________H&P Appt. Date________             13 If you  have a Living Will and Durable Power of  for Healthcare, please bring in a copy. 15. Notify your Surgeon if you develop any illness between now and surgery  time, cough, cold, fever, sore throat, nausea, vomiting, etc.  Please notify your surgeon if you experience dizziness, shortness of breath or blurred vision between now & the time of your surgery             15. DO NOT shave your operative site 96 hours prior to surgery. For face & neck surgery, men may use an electric razor 48 hours prior to surgery. 16. Shower the night before or morning of surgery using an antibacterial soap or as you have been instructed. 17. To provide excellent care visitors will be limited to one in the room at any given time.              18.  Please bring picture ID and

## 2023-04-19 NOTE — DISCHARGE INSTRUCTIONS
Call Dr. Keith Lara for any problems and to schedule followup [phone]    Ice to site today   May remove pressure bandage in a.m. Okay to shower starting tomorrow   Resume Eliquis tomorrow (4/20/2023)   Leave Prineo dressing in place until follow-up   Follow-up in 2 weeks   Call for appointment      Dermabond TOPICAL SKIN ADHESIVE CARE    4/19/2023    Dermabond is a clear, purple, sterile liquid skin adhesive that holds wound/incision edges together. The adhesive will usually remain in place for 5 to 10 days, then naturally wear or slough off your skin. Do not scratch, rub, or pick at the Dermabond adhesive film. Do not apply liquid or ointment medications, soap, powders or lotions to the incision while the Dermabond is in place. You may shower 24 hours after your surgery and briefly wet the Dermabond. Do not sit in a tub of water or swim. Do not soak or scrub your incision. After showering, gently blot your incision dry. No tape or any type of bandage/covering is required over the Dermabond. ANESTHESIA DISCHARGE INSTRUCTIONS    Wear your seatbelt home. You are under the influence of drugs-do not drink alcohol, drive, operate machinery, make any important decisions or sign any legal documents for 24 hours. Children should not ride bikes, [place] or play on gym sets for 24 hours after surgery. A responsible adult needs to be with you for 24 hours. You may experience lightheadedness, dizziness, or sleepiness following surgery. Rest at home today- increase activity as tolerated. Progress slowly to a regular diet unless your physician has instructed you otherwise. Drink plenty of water. If persistent nausea and vomiting becomes a problem, call your physician. Coughing, sore throat and muscle aches are other side effects of anesthesia, and should improve with time. Do not drive or operate machinery while taking narcotics.   Females of childbearing potential and on hormonal

## 2023-04-19 NOTE — BRIEF OP NOTE
Brief Postoperative Note      Patient: Von Tolentino  YOB: 1945  MRN: [id number]    Date of Procedure: 4/19/2023    Pre-Op Diagnosis Codes:     * Enlarged lymph nodes in armpit [R59.0]    Post-Op Diagnosis: Same       Procedure(s):  EXCISION OF ENLARGED RIGHT AXILLARY LYMPH NODE    Surgeon(s):  Eber Mckeon MD    Assistant:  First Assistant: Brenda Spence    Anesthesia: General    Estimated Blood Loss (mL): Minimal    Complications: None    Specimens:   ID Type Source Tests Collected by Time Destination   A : RIGHT AXILLARY LYMPH NODE    Tissue Lymph Node SURGICAL PATHOLOGY Eber Mckeon MD 4/19/2023 0913        Implants:  * No implants in log *      Drains: * No LDAs found *    Findings: enlarged R axillary lymph node  This procedure was not performed to treat breast cancer through axillary lymph node dissection      Electronically signed by Eber Mckeon MD on 4/19/2023 at 9:33 AM

## 2023-04-19 NOTE — ANESTHESIA PRE PROCEDURE
plan and risks discussed with patient. Use of blood products discussed with patient whom. Plan discussed with CRNA.                     Newton Perry MD   4/19/2023

## 2023-04-19 NOTE — ANESTHESIA POSTPROCEDURE EVALUATION
Department of Anesthesiology  Postprocedure Note    Patient: Loraine Velazquez  MRN: [id number]  YOB: 1945  Date of evaluation: 4/19/2023      Procedure Summary     Date: 04/19/23 Room / Location: [address]    Anesthesia Start: 4027 Anesthesia Stop: 5791    Procedure: EXCISION OF ENLARGED RIGHT AXILLARY LYMPH NODE (Right: Axilla) Diagnosis:       Enlarged lymph nodes in armpit      (R59.0  ENLARGED RIGHT AXILLARY LYMPH NODE)    Surgeons: Bladimir Evans MD Responsible Provider: Sharon Shaffer MD    Anesthesia Type: general ASA Status: 3          Anesthesia Type: No value filed.     Maria T Phase I: Maria T Score: 10    Maria T Phase II: Mraia T Score: 10      Anesthesia Post Evaluation    Patient location during evaluation: PACU  Patient participation: complete - patient participated  Level of consciousness: awake and alert  Airway patency: patent  Nausea & Vomiting: no nausea and no vomiting  Complications: no  Cardiovascular status: hemodynamically stable  Respiratory status: acceptable  Hydration status: stable  Multimodal analgesia pain management approach

## 2023-04-20 NOTE — OP NOTE
[place] [address]                     [address] [facility], [address] [facility]                                OPERATIVE REPORT    PATIENT NAME: Yash Fay                       :        1945  MED REC NO:   [id number]                          ROOM:  ACCOUNT NO:   [de-identified]                           ADMIT DATE: 2023  PROVIDER:     Bossman Garcia MD    DATE OF PROCEDURE:  2023    PREOPERATIVE DIAGNOSES:  Right axillary adenopathy, unexpected weight  loss. POSTOPERATIVE DIAGNOSES:  Right axillary adenopathy, unexpected weight  loss. PROCEDURE:  Excisional biopsy of enlarged right axillary lymph node. SURGEON:  Bossman Garcia MD    ANESTHESIA:  General and local.    ESTIMATED BLOOD LOSS:  Minimal.    COMPLICATIONS:  None. SPECIMEN:  Right axillary lymph node. OPERATIVE INDICATIONS AND CONSENT:  The patient is a 69-year-old male  with axillary adenopathy and unexpected weight loss. The plan is for  excisional biopsy of a right axillary lymph node. He was explained the  risks, benefits, and possible complications. DETAILS OF THE PROCEDURE:  The patient was brought to the operative  suite and placed in the supine position on the operating table. After  general anesthetic, his right arm was extended at 90 degrees to his body  and then he was prepped and draped in the usual sterile fashion. We made a 5-cm transverse incision below the hair-bearing area in the  right axilla. Dissection was carried down through the subcutaneous  tissue until the axilla was encountered. We encountered multiple  enlarged lymph nodes. We removed a markedly enlarged lymph node using  cautery. We had excellent hemostasis. The deepest subcutaneous layer  was closed with interrupted 3-0 Vicryl sutures.   The more superficial  layer of subcutaneous tissue was closed with interrupted 3-0 sutures as  well, followed by running 4-0 subcuticular suture in the

## 2023-04-24 RX ORDER — GABAPENTIN 300 MG/1
300 CAPSULE ORAL 2 TIMES DAILY
Qty: 180 CAPSULE | Refills: 0 | Status: SHIPPED | OUTPATIENT
Start: 2023-04-24 | End: 2023-07-23

## 2023-05-02 PROBLEM — C82.90: Status: ACTIVE | Noted: 2023-05-02

## 2023-05-04 ENCOUNTER — OFFICE VISIT (OUTPATIENT)
Dept: SURGERY | Age: 78
End: 2023-05-04

## 2023-05-04 VITALS — SYSTOLIC BLOOD PRESSURE: 120 MMHG | DIASTOLIC BLOOD PRESSURE: 86 MMHG | WEIGHT: 170 LBS | BODY MASS INDEX: 25.1 KG/M2

## 2023-05-04 DIAGNOSIS — R59.9 ADENOPATHY: Primary | ICD-10-CM

## 2023-05-04 PROCEDURE — 99024 POSTOP FOLLOW-UP VISIT: CPT | Performed by: SURGERY

## 2023-05-04 NOTE — PROGRESS NOTES
Subjective:      Patient ID: Eva Alaniz is a 69 y.o. male. HPI    Review of Systems    Objective:   Physical Exam  Incision healing well  Assessment:      52-year-old male status post right axillary lymph node biopsy. 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 He is doing well postoperatively and has already seen Dr. Obed Lr to develop a treatment plan. Plan:      Follow-up as needed.         Kinsey Pagan MD

## 2023-06-08 ENCOUNTER — TELEPHONE (OUTPATIENT)
Dept: PULMONOLOGY | Age: 78
End: 2023-06-08

## 2023-06-08 NOTE — TELEPHONE ENCOUNTER
LM on  for pt to call office to schedule appt. He will need to be seen ASAP for compliance. Please let me talk to patient when he calls back.

## 2023-06-22 ENCOUNTER — OFFICE VISIT (OUTPATIENT)
Dept: PULMONOLOGY | Age: 78
End: 2023-06-22
Payer: MEDICARE

## 2023-06-22 VITALS
SYSTOLIC BLOOD PRESSURE: 122 MMHG | OXYGEN SATURATION: 95 % | HEART RATE: 93 BPM | HEIGHT: 69 IN | WEIGHT: 175 LBS | BODY MASS INDEX: 25.92 KG/M2 | DIASTOLIC BLOOD PRESSURE: 78 MMHG

## 2023-06-22 DIAGNOSIS — R91.1 LUNG NODULE: Primary | ICD-10-CM

## 2023-06-22 DIAGNOSIS — Z72.0 TOBACCO ABUSE: ICD-10-CM

## 2023-06-22 DIAGNOSIS — G47.33 OBSTRUCTIVE SLEEP APNEA (ADULT) (PEDIATRIC): ICD-10-CM

## 2023-06-22 DIAGNOSIS — G47.31 CSA (CENTRAL SLEEP APNEA): ICD-10-CM

## 2023-06-22 PROCEDURE — 1123F ACP DISCUSS/DSCN MKR DOCD: CPT | Performed by: INTERNAL MEDICINE

## 2023-06-22 PROCEDURE — 3078F DIAST BP <80 MM HG: CPT | Performed by: INTERNAL MEDICINE

## 2023-06-22 PROCEDURE — 99214 OFFICE O/P EST MOD 30 MIN: CPT | Performed by: INTERNAL MEDICINE

## 2023-06-22 PROCEDURE — 3074F SYST BP LT 130 MM HG: CPT | Performed by: INTERNAL MEDICINE

## 2023-06-22 ASSESSMENT — SLEEP AND FATIGUE QUESTIONNAIRES
HOW LIKELY ARE YOU TO NOD OFF OR FALL ASLEEP WHILE WATCHING TV: 0
HOW LIKELY ARE YOU TO NOD OFF OR FALL ASLEEP WHILE SITTING AND READING: 0
HOW LIKELY ARE YOU TO NOD OFF OR FALL ASLEEP WHILE SITTING QUIETLY AFTER LUNCH WITHOUT ALCOHOL: 0
HOW LIKELY ARE YOU TO NOD OFF OR FALL ASLEEP WHILE LYING DOWN TO REST IN THE AFTERNOON WHEN CIRCUMSTANCES PERMIT: 0
HOW LIKELY ARE YOU TO NOD OFF OR FALL ASLEEP WHILE SITTING INACTIVE IN A PUBLIC PLACE: 0
HOW LIKELY ARE YOU TO NOD OFF OR FALL ASLEEP IN A CAR, WHILE STOPPED FOR A FEW MINUTES IN TRAFFIC: 0
HOW LIKELY ARE YOU TO NOD OFF OR FALL ASLEEP WHILE SITTING AND TALKING TO SOMEONE: 0
ESS TOTAL SCORE: 2
HOW LIKELY ARE YOU TO NOD OFF OR FALL ASLEEP WHEN YOU ARE A PASSENGER IN A CAR FOR AN HOUR WITHOUT A BREAK: 2

## 2023-06-22 NOTE — PROGRESS NOTES
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 No new chest imaging for me to review. Pulmonary Functions Testing Results:    Baseline polysomnography done on 1/19/2021  Sleep efficiency: 79.3  Overall AHI: 31.5  REM AHI: 60  Lowest oxygen saturation: 77%  Time spent below an oxygen saturation 90%: 1.6 minutes      CPAP titration done on 2/22/2021  Type of therapy: Auto BiPAP with EPAP min: 13, pressure support range of 4-8 cmH2O with IPAP max of 25 cmH2O. With medium DreamWear full facemask. CPAP compliance summary     3/7/2023 -4/5/2023 5/15/2021-8/12/2021 3/5/2021-5/16/2021   Days with device usage 30/30 days  84/90 days  67/73 days   Average Usage 9 hours 29 minutes  6 hours 9 minutes  5 hours 38 minutes 10 seconds   Days with device usage >4hrs 97%  87.8%  79.5%   Large Leak per night 90.8 L/min  4 hours 16 minutes  3 hours 46 minutes   AHI 4.6  5.1  8.1   CPAP settings ASV with EPAP range of 7 to 10 cm H2O, pressure support range of 3 to 15 cm H2O  auto BiPAP  auto BiPAP   90th percentile pressure EPAP: 8.1, IPAP: 17.5  IPAP: 15, EPAP: 10  IPAP: 17, EPAP: 13   Mask AirFit N30 I  fullface mask  full facemask     DME: [facility]      IMPRESSION AND RECOMMENDATIONS:     1. GORDON (obstructive sleep apnea)  -Patient was seen to have central sleep apnea for which he has been started on ASV therapy.  -Till the month of April 2023 patient was using this therapy regularly and it was controlling his sleep disordered breathing very well. -Recently due to multiple medical issues he has been unable to use the therapy.    -Additionally he is reporting ill fitting mask interface. I have advised him to reach out to his DME to refit his mask and try a new style of mask interface.  -No changes made to the ASV settings today.  -Strongly urged the patient to use the therapy for at least 4 hours during the 24-hours in order to  get maximum benefit.       2. Essential hypertension  -Currently patient on Zoloft and

## 2023-07-07 ENCOUNTER — TELEPHONE (OUTPATIENT)
Dept: PULMONOLOGY | Age: 78
End: 2023-07-07

## 2023-07-07 NOTE — TELEPHONE ENCOUNTER
Spoke with [facility]. Patient not compliant with ASV. Insurance denied. Willing to let patient restart process. Order and LAST OFFICE VISIT  note faxed to [facility]. They will contact patient for mask refit.

## 2023-07-10 ENCOUNTER — TELEPHONE (OUTPATIENT)
Dept: PULMONOLOGY | Age: 78
End: 2023-07-10

## 2023-07-10 NOTE — TELEPHONE ENCOUNTER
Grecia Maynard called and said they need a letter for health insurance saying that pt is now in compliance so they will continue to pay for machine. Call her to discuss.     [id number] [address] # [phone]

## 2023-07-10 NOTE — TELEPHONE ENCOUNTER
Spoke with Teresita Mariee. Informed her that I had already sent last OV note to [address]. Also patient will need an appt for compliance in 2-3 months since he is starting the compliance period over. She will call back to schedule that appt.

## 2023-07-17 ENCOUNTER — TELEPHONE (OUTPATIENT)
Dept: PULMONOLOGY | Age: 78
End: 2023-07-17

## 2023-07-17 NOTE — TELEPHONE ENCOUNTER
LM on  for pt to call office to schedule ASV compliance in Mid September or early October. Patient had to restart compliance period.

## 2023-09-12 ENCOUNTER — OFFICE VISIT (OUTPATIENT)
Dept: PULMONOLOGY | Age: 78
End: 2023-09-12

## 2023-09-12 VITALS
HEIGHT: 69 IN | OXYGEN SATURATION: 98 % | SYSTOLIC BLOOD PRESSURE: 136 MMHG | DIASTOLIC BLOOD PRESSURE: 88 MMHG | HEART RATE: 95 BPM | BODY MASS INDEX: 25.77 KG/M2 | WEIGHT: 174 LBS

## 2023-09-12 DIAGNOSIS — I10 ESSENTIAL HYPERTENSION: Chronic | ICD-10-CM

## 2023-09-12 DIAGNOSIS — F17.210 CIGARETTE NICOTINE DEPENDENCE WITHOUT COMPLICATION: ICD-10-CM

## 2023-09-12 DIAGNOSIS — R91.1 PULMONARY NODULE: ICD-10-CM

## 2023-09-12 DIAGNOSIS — G47.33 OSA (OBSTRUCTIVE SLEEP APNEA): Primary | ICD-10-CM

## 2023-09-12 ASSESSMENT — SLEEP AND FATIGUE QUESTIONNAIRES
HOW LIKELY ARE YOU TO NOD OFF OR FALL ASLEEP WHEN YOU ARE A PASSENGER IN A CAR FOR AN HOUR WITHOUT A BREAK: 0
HOW LIKELY ARE YOU TO NOD OFF OR FALL ASLEEP WHILE SITTING QUIETLY AFTER LUNCH WITHOUT ALCOHOL: 1
HOW LIKELY ARE YOU TO NOD OFF OR FALL ASLEEP WHILE SITTING INACTIVE IN A PUBLIC PLACE: 0
HOW LIKELY ARE YOU TO NOD OFF OR FALL ASLEEP WHILE LYING DOWN TO REST IN THE AFTERNOON WHEN CIRCUMSTANCES PERMIT: 2
ESS TOTAL SCORE: 4
HOW LIKELY ARE YOU TO NOD OFF OR FALL ASLEEP WHILE WATCHING TV: 0
HOW LIKELY ARE YOU TO NOD OFF OR FALL ASLEEP WHILE SITTING AND TALKING TO SOMEONE: 0
HOW LIKELY ARE YOU TO NOD OFF OR FALL ASLEEP IN A CAR, WHILE STOPPED FOR A FEW MINUTES IN TRAFFIC: 0
HOW LIKELY ARE YOU TO NOD OFF OR FALL ASLEEP WHILE SITTING AND READING: 1

## 2023-09-12 NOTE — PROGRESS NOTES
PULMONARY OFFICE NEW PATIENT VISIT    CONSULTING PHYSICIAN:  Larry Kumar MD     REASON FOR VISIT:   Chief Complaint   Patient presents with    Pulmonary Nodule    Sleep Apnea           DATE OF VISIT: 9/12/2023    HISTORY OF PRESENT ILLNESS: 69y.o. year old male comes in with pulmonary/sleep clinic for a follow-up. Patient has started using his ASV machine very regularly. Feels benefited from it. Sleeping better. Mask continues to leak. Moles on his face and he has to keep on adjusting it. Still undergoing chemotherapy for his diagnosis of CLL. Has quit smoking since November 2022. Remains active. Weight remains stable. Previously: Since the last clinic visit patient underwent axillary lymph node biopsy which showed CLL findings. Currently getting chemotherapy by medical oncology. Additionally suffered with urinary retention/UTI and underwent UroLift procedure. Because of these medical issues he has been unable to use the ASV therapy regularly. Additionally has found that his mask is leaking and not fitting him well. Humidification was also turned up high which caused him to feel warm air coming through the machine. Weight remains stable. Continues to smoke about 5 to 6 cigarettes daily. Smokes more when he is under a lot of physical or emotional stress. Stays motivated to quit. Patient reports that he could not use the BiPAP therapy despite trying different masks and following return the machine. Currently able to sleep for 8 to 10 hours during daytime. Continues to have snoring. Weight remains stable. Had a repeat CT chest done in June of this year which showed that the left lung pulm nodule has grown a little bit. He continues to smoke about half pack of cigarettes daily. No anorexia or weight loss. No hemoptysis no fevers. Patient remains depressed as well has intermittent anxiety.   Patient reports that since September 2021 he has not been able to use the BiPAP

## 2023-09-15 NOTE — PROGRESS NOTES
Angelique Crowder   1945, 69 y.o. male   [id number]       Referring Provider: Eagle Spann MD  Referral Type: In an order in [address] [place] [address]    Reason for Visit: Evaluation of suspected change in hearing, tinnitus, or balance. ADULT AUDIOLOGIC EVALUATION      Angelique Crowder is a 69 y.o. male seen today, 9/18/2023 , for a recheck audiologic evaluation. Patient was seen by Annelise Brambila DO following today's evaluation. AUDIOLOGIC AND OTHER PERTINENT MEDICAL HISTORY:      Angelique Crowder reported a change in hearing in his right ear about 1 week ago. He stated that the hearing has recovered some, but has not returned to normal. Patient noted some popping and fullness in his left ear as well. He noted a history of drainage on the right side for many years. Patient has a history of -related noise exposure. Angelique Crowder denied otalgia, tinnitus, dizziness, imbalance, history of falls, history of head trauma, history of ear surgery, and family history of hearing loss. Previous history and results from audiologic evaluation on 1/26/2021:   A mild to moderate sloping to severe sensorineural hearing loss bilaterally. Date: 9/18/2023     IMPRESSIONS:      Today's results revealed a mild sloping to severe sensorineural hearing loss in the left ear and a mild sloping to severe mixed (likely sensorineural) hearing loss in the right ear. Excellent/fair speech understanding when in quiet. Tympanometry indicates low movement of ear drum/tympanic membrane, consistent with middle ear abnormality. Discussed test results and implications with patient. Discussed benefits of amplification pending medical clearance. Discussed noise exposure and the importance of appropriate hearing protection when in hazardous noise environments. Follow medical recommendations of Annelise Brambila DO.     ASSESSMENT AND FINDINGS:     Otoscopy revealed non-occluding cerumen in the left ear .     RIGHT EAR:  Hearing Sensitivity: Mild

## 2023-09-18 ENCOUNTER — OFFICE VISIT (OUTPATIENT)
Dept: ENT CLINIC | Age: 78
End: 2023-09-18
Payer: MEDICARE

## 2023-09-18 ENCOUNTER — PROCEDURE VISIT (OUTPATIENT)
Dept: AUDIOLOGY | Age: 78
End: 2023-09-18
Payer: MEDICARE

## 2023-09-18 VITALS
BODY MASS INDEX: 26.36 KG/M2 | HEIGHT: 69 IN | OXYGEN SATURATION: 95 % | SYSTOLIC BLOOD PRESSURE: 129 MMHG | WEIGHT: 178 LBS | TEMPERATURE: 97.1 F | HEART RATE: 72 BPM | DIASTOLIC BLOOD PRESSURE: 88 MMHG

## 2023-09-18 DIAGNOSIS — H69.93 DYSFUNCTION OF BOTH EUSTACHIAN TUBES: ICD-10-CM

## 2023-09-18 DIAGNOSIS — Z85.6 HX OF CHRONIC LYMPHOCYTIC LEUKEMIA: ICD-10-CM

## 2023-09-18 DIAGNOSIS — J30.9 ALLERGIC RHINITIS, UNSPECIFIED SEASONALITY, UNSPECIFIED TRIGGER: ICD-10-CM

## 2023-09-18 DIAGNOSIS — H65.23 BILATERAL CHRONIC SEROUS OTITIS MEDIA: Primary | ICD-10-CM

## 2023-09-18 DIAGNOSIS — H90.A22 SENSORINEURAL HEARING LOSS (SNHL) OF LEFT EAR WITH RESTRICTED HEARING OF RIGHT EAR: Primary | ICD-10-CM

## 2023-09-18 DIAGNOSIS — H90.A31 MIXED CONDUCTIVE AND SENSORINEURAL HEARING LOSS OF RIGHT EAR WITH RESTRICTED HEARING OF LEFT EAR: ICD-10-CM

## 2023-09-18 PROCEDURE — 3078F DIAST BP <80 MM HG: CPT | Performed by: STUDENT IN AN ORGANIZED HEALTH CARE EDUCATION/TRAINING PROGRAM

## 2023-09-18 PROCEDURE — 99214 OFFICE O/P EST MOD 30 MIN: CPT | Performed by: STUDENT IN AN ORGANIZED HEALTH CARE EDUCATION/TRAINING PROGRAM

## 2023-09-18 PROCEDURE — 92557 COMPREHENSIVE HEARING TEST: CPT

## 2023-09-18 PROCEDURE — 92567 TYMPANOMETRY: CPT

## 2023-09-18 PROCEDURE — 1123F ACP DISCUSS/DSCN MKR DOCD: CPT | Performed by: STUDENT IN AN ORGANIZED HEALTH CARE EDUCATION/TRAINING PROGRAM

## 2023-09-18 PROCEDURE — 3074F SYST BP LT 130 MM HG: CPT | Performed by: STUDENT IN AN ORGANIZED HEALTH CARE EDUCATION/TRAINING PROGRAM

## 2023-09-18 PROCEDURE — 31231 NASAL ENDOSCOPY DX: CPT | Performed by: STUDENT IN AN ORGANIZED HEALTH CARE EDUCATION/TRAINING PROGRAM

## 2023-09-18 RX ORDER — PREDNISONE 10 MG/1
TABLET ORAL
Qty: 21 TABLET | Refills: 0 | Status: SHIPPED | OUTPATIENT
Start: 2023-09-18

## 2023-09-18 RX ORDER — FLUTICASONE PROPIONATE 50 MCG
2 SPRAY, SUSPENSION (ML) NASAL DAILY
Qty: 48 G | Refills: 1 | Status: SHIPPED | OUTPATIENT
Start: 2023-09-18

## 2023-09-19 DIAGNOSIS — H91.90 HEARING LOSS, UNSPECIFIED HEARING LOSS TYPE, UNSPECIFIED LATERALITY: Primary | ICD-10-CM

## 2023-10-02 DIAGNOSIS — F41.9 ANXIETY: ICD-10-CM

## 2023-10-02 RX ORDER — FINASTERIDE 5 MG/1
5 TABLET, FILM COATED ORAL DAILY
Qty: 90 TABLET | Refills: 0 | Status: SHIPPED | OUTPATIENT
Start: 2023-10-02 | End: 2023-12-31

## 2023-10-02 RX ORDER — CLONAZEPAM 0.5 MG/1
0.25 TABLET ORAL 2 TIMES DAILY PRN
Qty: 90 TABLET | Refills: 0 | Status: SHIPPED | OUTPATIENT
Start: 2023-10-02 | End: 2023-12-31

## 2023-11-01 NOTE — PROGRESS NOTES
Name_______________________________________Printed:____________________  Date and time of surgery____11/6/2023________0730____________Arrival Time:__0600______________   1. The instructions given regarding when and if a patient needs to stop oral intake prior to surgery varies. Follow the specific instructions you were given                  __x_Nothing to eat or to drink after Midnight the night before.                   ____Carbo loading or instructions will be given to select patients-if you have been given those instructions -please do the following                           The evening before your surgery after dinner before midnight drink 40 ounces of gatorade. If you are diabetic use sugar free. The morning of surgery drink 40 ounces of water. This needs to be finished 3 hours prior to your surgery start time. 2. Take the following pills with a small sip of water on the morning of surgery___metoprolol________________________________________________                  Do not take blood pressure medications ending in pril or sartan the christiano prior to surgery or the morning of surgery. Dr Joseline Branch patient are not to take any medications the AM of surgery. 3. Aspirin, Ibuprofen, Advil, Naproxen, Vitamin E and other Anti-inflammatory products and supplements should be stopped for 5 -7days before surgery or as directed by your physician. 4. Check with your Doctor regarding stopping Plavix, Coumadin,Eliquis, Lovenox,Effient,Pradaxa,Xarelto, Fragmin or other blood thinners and follow their instructions. 5. Do not smoke, and do not drink any alcoholic beverages 24 hours prior to surgery. This includes NA Beer. Refrain from the usage of any recreational drugs. 6. You may brush your teeth and gargle the morning of surgery. DO NOT SWALLOW WATER   7. You MUST make arrangements for a responsible adult to stay on site while you are here and take you home after your surgery.  You will not be allowed to leave alone

## 2023-11-02 ENCOUNTER — OFFICE VISIT (OUTPATIENT)
Dept: ENT CLINIC | Age: 78
End: 2023-11-02
Payer: MEDICARE

## 2023-11-02 VITALS
HEIGHT: 69 IN | BODY MASS INDEX: 25.18 KG/M2 | OXYGEN SATURATION: 96 % | SYSTOLIC BLOOD PRESSURE: 116 MMHG | HEART RATE: 75 BPM | WEIGHT: 170 LBS | TEMPERATURE: 97.1 F | DIASTOLIC BLOOD PRESSURE: 77 MMHG

## 2023-11-02 DIAGNOSIS — H69.93 DYSFUNCTION OF BOTH EUSTACHIAN TUBES: Primary | ICD-10-CM

## 2023-11-02 DIAGNOSIS — H90.3 SENSORINEURAL HEARING LOSS (SNHL) OF BOTH EARS: ICD-10-CM

## 2023-11-02 DIAGNOSIS — H61.21 IMPACTED CERUMEN OF RIGHT EAR: ICD-10-CM

## 2023-11-02 PROCEDURE — 99213 OFFICE O/P EST LOW 20 MIN: CPT | Performed by: STUDENT IN AN ORGANIZED HEALTH CARE EDUCATION/TRAINING PROGRAM

## 2023-11-02 PROCEDURE — 69210 REMOVE IMPACTED EAR WAX UNI: CPT | Performed by: STUDENT IN AN ORGANIZED HEALTH CARE EDUCATION/TRAINING PROGRAM

## 2023-11-02 PROCEDURE — 1123F ACP DISCUSS/DSCN MKR DOCD: CPT | Performed by: STUDENT IN AN ORGANIZED HEALTH CARE EDUCATION/TRAINING PROGRAM

## 2023-11-02 PROCEDURE — 3078F DIAST BP <80 MM HG: CPT | Performed by: STUDENT IN AN ORGANIZED HEALTH CARE EDUCATION/TRAINING PROGRAM

## 2023-11-02 PROCEDURE — 3074F SYST BP LT 130 MM HG: CPT | Performed by: STUDENT IN AN ORGANIZED HEALTH CARE EDUCATION/TRAINING PROGRAM

## 2023-11-02 NOTE — PROGRESS NOTES
Encouraged loud noise avoidance and wearing of hearing protection when exposed. - Recommend surveillance of hearing with comprehensive audiological evaluation in 1-2 years. Follow-Up     Return in about 1 year (around 11/2/2024), or if symptoms worsen or fail to improve, for audiogram prior to appointment. Dr. Guillermo Ugarte, [facility]  Department of Otolaryngology/Head and Neck Surgery  11/2/23    Medical Decision Making: The following items were considered in medical decision making:  Independent review of images  Review / order clinical lab tests  Review / order radiology tests  Decision to obtain old records      This note was generated completely or in part utilizing Dragon dictation speech recognition software. Occasionally, words are mistranscribed and despite editing, the text may contain inaccuracies due to incorrect word recognition. If further clarification is needed please contact the office at [phone].

## 2023-11-06 ENCOUNTER — ANESTHESIA (OUTPATIENT)
Dept: OPERATING ROOM | Age: 78
End: 2023-11-06
Payer: MEDICARE

## 2023-11-06 ENCOUNTER — ANESTHESIA EVENT (OUTPATIENT)
Dept: OPERATING ROOM | Age: 78
End: 2023-11-06
Payer: MEDICARE

## 2023-11-06 ENCOUNTER — HOSPITAL ENCOUNTER (OUTPATIENT)
Age: 78
Setting detail: OUTPATIENT SURGERY
Discharge: HOME | End: 2023-11-06
Attending: UROLOGY | Admitting: UROLOGY
Payer: MEDICARE

## 2023-11-06 VITALS
OXYGEN SATURATION: 97 % | TEMPERATURE: 97.3 F | BODY MASS INDEX: 25.31 KG/M2 | SYSTOLIC BLOOD PRESSURE: 144 MMHG | HEART RATE: 77 BPM | RESPIRATION RATE: 21 BRPM | WEIGHT: 171.4 LBS | DIASTOLIC BLOOD PRESSURE: 83 MMHG

## 2023-11-06 LAB
DEPRECATED RDW RBC AUTO: 14.1 % (ref 12.4–15.4)
HCT VFR BLD AUTO: 43.3 % (ref 40.5–52.5)
HGB BLD-MCNC: 14.3 G/DL (ref 13.5–17.5)
MCH RBC QN AUTO: 30 PG (ref 26–34)
MCHC RBC AUTO-ENTMCNC: 33.1 G/DL (ref 31–36)
MCV RBC AUTO: 90.7 FL (ref 80–100)
PLATELET # BLD AUTO: 174 K/UL (ref 135–450)
PMV BLD AUTO: 8.9 FL (ref 5–10.5)
RBC # BLD AUTO: 4.78 M/UL (ref 4.2–5.9)
WBC # BLD AUTO: 7.7 K/UL (ref 4–11)

## 2023-11-06 PROCEDURE — 3700000000 HC ANESTHESIA ATTENDED CARE: Performed by: UROLOGY

## 2023-11-06 PROCEDURE — 2709999900 HC NON-CHARGEABLE SUPPLY: Performed by: UROLOGY

## 2023-11-06 PROCEDURE — 2720000010 HC SURG SUPPLY STERILE: Performed by: UROLOGY

## 2023-11-06 PROCEDURE — 3600000004 HC SURGERY LEVEL 4 BASE: Performed by: UROLOGY

## 2023-11-06 PROCEDURE — 7100000001 HC PACU RECOVERY - ADDTL 15 MIN: Performed by: UROLOGY

## 2023-11-06 PROCEDURE — 6360000002 HC RX W HCPCS: Performed by: NURSE ANESTHETIST, CERTIFIED REGISTERED

## 2023-11-06 PROCEDURE — 2580000003 HC RX 258: Performed by: UROLOGY

## 2023-11-06 PROCEDURE — 6360000002 HC RX W HCPCS: Performed by: UROLOGY

## 2023-11-06 PROCEDURE — 2500000003 HC RX 250 WO HCPCS: Performed by: NURSE ANESTHETIST, CERTIFIED REGISTERED

## 2023-11-06 PROCEDURE — 3600000014 HC SURGERY LEVEL 4 ADDTL 15MIN: Performed by: UROLOGY

## 2023-11-06 PROCEDURE — 7100000011 HC PHASE II RECOVERY - ADDTL 15 MIN: Performed by: UROLOGY

## 2023-11-06 PROCEDURE — 7100000000 HC PACU RECOVERY - FIRST 15 MIN: Performed by: UROLOGY

## 2023-11-06 PROCEDURE — 93005 ELECTROCARDIOGRAM TRACING: CPT | Performed by: ANESTHESIOLOGY

## 2023-11-06 PROCEDURE — 36415 COLL VENOUS BLD VENIPUNCTURE: CPT

## 2023-11-06 PROCEDURE — 85027 COMPLETE CBC AUTOMATED: CPT

## 2023-11-06 PROCEDURE — 3700000001 HC ADD 15 MINUTES (ANESTHESIA): Performed by: UROLOGY

## 2023-11-06 PROCEDURE — 7100000010 HC PHASE II RECOVERY - FIRST 15 MIN: Performed by: UROLOGY

## 2023-11-06 RX ORDER — SODIUM CHLORIDE, SODIUM LACTATE, POTASSIUM CHLORIDE, CALCIUM CHLORIDE 600; 310; 30; 20 MG/100ML; MG/100ML; MG/100ML; MG/100ML
INJECTION, SOLUTION INTRAVENOUS CONTINUOUS
Status: DISCONTINUED | OUTPATIENT
Start: 2023-11-06 | End: 2023-11-06 | Stop reason: HOSPADM

## 2023-11-06 RX ORDER — LIDOCAINE HYDROCHLORIDE 20 MG/ML
INJECTION, SOLUTION EPIDURAL; INFILTRATION; INTRACAUDAL; PERINEURAL PRN
Status: DISCONTINUED | OUTPATIENT
Start: 2023-11-06 | End: 2023-11-06 | Stop reason: SDUPTHER

## 2023-11-06 RX ORDER — PHENYLEPHRINE HCL IN 0.9% NACL 1 MG/10 ML
SYRINGE (ML) INTRAVENOUS PRN
Status: DISCONTINUED | OUTPATIENT
Start: 2023-11-06 | End: 2023-11-06 | Stop reason: SDUPTHER

## 2023-11-06 RX ORDER — CIPROFLOXACIN 2 MG/ML
400 INJECTION, SOLUTION INTRAVENOUS
Status: DISCONTINUED | OUTPATIENT
Start: 2023-11-06 | End: 2023-11-06 | Stop reason: CLARIF

## 2023-11-06 RX ORDER — SODIUM CHLORIDE 0.9 % (FLUSH) 0.9 %
5-40 SYRINGE (ML) INJECTION PRN
Status: DISCONTINUED | OUTPATIENT
Start: 2023-11-06 | End: 2023-11-06 | Stop reason: HOSPADM

## 2023-11-06 RX ORDER — GLYCOPYRROLATE 0.2 MG/ML
INJECTION INTRAMUSCULAR; INTRAVENOUS PRN
Status: DISCONTINUED | OUTPATIENT
Start: 2023-11-06 | End: 2023-11-06 | Stop reason: SDUPTHER

## 2023-11-06 RX ORDER — HYDROMORPHONE HYDROCHLORIDE 2 MG/ML
0.5 INJECTION, SOLUTION INTRAMUSCULAR; INTRAVENOUS; SUBCUTANEOUS EVERY 5 MIN PRN
Status: DISCONTINUED | OUTPATIENT
Start: 2023-11-06 | End: 2023-11-06 | Stop reason: HOSPADM

## 2023-11-06 RX ORDER — SODIUM CHLORIDE 9 MG/ML
INJECTION, SOLUTION INTRAVENOUS PRN
Status: CANCELLED | OUTPATIENT
Start: 2023-11-06

## 2023-11-06 RX ORDER — MAGNESIUM SULFATE HEPTAHYDRATE 500 MG/ML
INJECTION, SOLUTION INTRAMUSCULAR; INTRAVENOUS PRN
Status: DISCONTINUED | OUTPATIENT
Start: 2023-11-06 | End: 2023-11-06 | Stop reason: SDUPTHER

## 2023-11-06 RX ORDER — LEVOFLOXACIN 5 MG/ML
500 INJECTION, SOLUTION INTRAVENOUS ONCE
Status: COMPLETED | OUTPATIENT
Start: 2023-11-06 | End: 2023-11-06

## 2023-11-06 RX ORDER — LABETALOL HYDROCHLORIDE 5 MG/ML
5 INJECTION, SOLUTION INTRAVENOUS
Status: DISCONTINUED | OUTPATIENT
Start: 2023-11-06 | End: 2023-11-06 | Stop reason: HOSPADM

## 2023-11-06 RX ORDER — PROPOFOL 10 MG/ML
INJECTION, EMULSION INTRAVENOUS PRN
Status: DISCONTINUED | OUTPATIENT
Start: 2023-11-06 | End: 2023-11-06 | Stop reason: SDUPTHER

## 2023-11-06 RX ORDER — SODIUM CHLORIDE 0.9 % (FLUSH) 0.9 %
5-40 SYRINGE (ML) INJECTION PRN
Status: CANCELLED | OUTPATIENT
Start: 2023-11-06

## 2023-11-06 RX ORDER — SODIUM CHLORIDE 0.9 % (FLUSH) 0.9 %
5-40 SYRINGE (ML) INJECTION EVERY 12 HOURS SCHEDULED
Status: CANCELLED | OUTPATIENT
Start: 2023-11-06

## 2023-11-06 RX ORDER — SUCCINYLCHOLINE/SOD CL,ISO/PF 200MG/10ML
SYRINGE (ML) INTRAVENOUS PRN
Status: DISCONTINUED | OUTPATIENT
Start: 2023-11-06 | End: 2023-11-06 | Stop reason: SDUPTHER

## 2023-11-06 RX ORDER — LIDOCAINE HYDROCHLORIDE 10 MG/ML
0.5 INJECTION, SOLUTION EPIDURAL; INFILTRATION; INTRACAUDAL; PERINEURAL ONCE
Status: DISCONTINUED | OUTPATIENT
Start: 2023-11-06 | End: 2023-11-06 | Stop reason: HOSPADM

## 2023-11-06 RX ORDER — ONDANSETRON 2 MG/ML
4 INJECTION INTRAMUSCULAR; INTRAVENOUS
Status: DISCONTINUED | OUTPATIENT
Start: 2023-11-06 | End: 2023-11-06 | Stop reason: HOSPADM

## 2023-11-06 RX ORDER — ONDANSETRON 2 MG/ML
INJECTION INTRAMUSCULAR; INTRAVENOUS PRN
Status: DISCONTINUED | OUTPATIENT
Start: 2023-11-06 | End: 2023-11-06 | Stop reason: SDUPTHER

## 2023-11-06 RX ORDER — ROCURONIUM BROMIDE 10 MG/ML
INJECTION, SOLUTION INTRAVENOUS PRN
Status: DISCONTINUED | OUTPATIENT
Start: 2023-11-06 | End: 2023-11-06 | Stop reason: SDUPTHER

## 2023-11-06 RX ORDER — SODIUM CHLORIDE 9 MG/ML
INJECTION, SOLUTION INTRAVENOUS PRN
Status: DISCONTINUED | OUTPATIENT
Start: 2023-11-06 | End: 2023-11-06 | Stop reason: HOSPADM

## 2023-11-06 RX ORDER — HYDROMORPHONE HYDROCHLORIDE 2 MG/ML
0.25 INJECTION, SOLUTION INTRAMUSCULAR; INTRAVENOUS; SUBCUTANEOUS EVERY 5 MIN PRN
Status: DISCONTINUED | OUTPATIENT
Start: 2023-11-06 | End: 2023-11-06 | Stop reason: HOSPADM

## 2023-11-06 RX ORDER — BUSPIRONE HYDROCHLORIDE 15 MG/1
TABLET ORAL
COMMUNITY
Start: 2023-10-26

## 2023-11-06 RX ORDER — SODIUM CHLORIDE 9 MG/ML
INJECTION, SOLUTION INTRAVENOUS CONTINUOUS
Status: DISCONTINUED | OUTPATIENT
Start: 2023-11-06 | End: 2023-11-06 | Stop reason: HOSPADM

## 2023-11-06 RX ORDER — MAGNESIUM HYDROXIDE 1200 MG/15ML
LIQUID ORAL CONTINUOUS PRN
Status: COMPLETED | OUTPATIENT
Start: 2023-11-06 | End: 2023-11-06

## 2023-11-06 RX ORDER — SODIUM CHLORIDE 0.9 % (FLUSH) 0.9 %
5-40 SYRINGE (ML) INJECTION EVERY 12 HOURS SCHEDULED
Status: DISCONTINUED | OUTPATIENT
Start: 2023-11-06 | End: 2023-11-06 | Stop reason: HOSPADM

## 2023-11-06 RX ORDER — DEXAMETHASONE SODIUM PHOSPHATE 4 MG/ML
INJECTION, SOLUTION INTRA-ARTICULAR; INTRALESIONAL; INTRAMUSCULAR; INTRAVENOUS; SOFT TISSUE PRN
Status: DISCONTINUED | OUTPATIENT
Start: 2023-11-06 | End: 2023-11-06 | Stop reason: SDUPTHER

## 2023-11-06 RX ORDER — LIDOCAINE HYDROCHLORIDE 10 MG/ML
1 INJECTION, SOLUTION EPIDURAL; INFILTRATION; INTRACAUDAL; PERINEURAL
Status: CANCELLED | OUTPATIENT
Start: 2023-11-06 | End: 2023-11-07

## 2023-11-06 RX ADMIN — PROPOFOL 150 MG: 10 INJECTION, EMULSION INTRAVENOUS at 08:14

## 2023-11-06 RX ADMIN — Medication 200 MCG: at 08:46

## 2023-11-06 RX ADMIN — PROPOFOL 150 MG: 10 INJECTION, EMULSION INTRAVENOUS at 07:39

## 2023-11-06 RX ADMIN — GLYCOPYRROLATE 0.2 MG: 0.2 INJECTION INTRAMUSCULAR; INTRAVENOUS at 07:53

## 2023-11-06 RX ADMIN — SODIUM CHLORIDE, POTASSIUM CHLORIDE, SODIUM LACTATE AND CALCIUM CHLORIDE: 600; 310; 30; 20 INJECTION, SOLUTION INTRAVENOUS at 06:58

## 2023-11-06 RX ADMIN — ROCURONIUM BROMIDE 10 MG: 10 INJECTION, SOLUTION INTRAVENOUS at 07:39

## 2023-11-06 RX ADMIN — Medication 100 MCG: at 08:30

## 2023-11-06 RX ADMIN — Medication 150 MCG: at 08:15

## 2023-11-06 RX ADMIN — Medication 150 MCG: at 08:18

## 2023-11-06 RX ADMIN — Medication 150 MCG: at 08:00

## 2023-11-06 RX ADMIN — Medication 100 MCG: at 07:49

## 2023-11-06 RX ADMIN — DEXAMETHASONE SODIUM PHOSPHATE 10 MG: 4 INJECTION, SOLUTION INTRAMUSCULAR; INTRAVENOUS at 07:46

## 2023-11-06 RX ADMIN — ONDANSETRON 4 MG: 2 INJECTION INTRAMUSCULAR; INTRAVENOUS at 07:46

## 2023-11-06 RX ADMIN — LIDOCAINE HYDROCHLORIDE 100 MG: 20 INJECTION, SOLUTION EPIDURAL; INFILTRATION; INTRACAUDAL; PERINEURAL at 07:39

## 2023-11-06 RX ADMIN — MAGNESIUM SULFATE HEPTAHYDRATE 1 G: 500 INJECTION, SOLUTION INTRAMUSCULAR; INTRAVENOUS at 07:46

## 2023-11-06 RX ADMIN — LEVOFLOXACIN 500 MG: 5 INJECTION, SOLUTION INTRAVENOUS at 07:29

## 2023-11-06 RX ADMIN — Medication 100 MG: at 07:40

## 2023-11-06 ASSESSMENT — LIFESTYLE VARIABLES: SMOKING_STATUS: 1

## 2023-11-06 ASSESSMENT — PAIN SCALES - GENERAL
PAINLEVEL_OUTOF10: 0
PAINLEVEL_OUTOF10: 0

## 2023-11-06 ASSESSMENT — PAIN - FUNCTIONAL ASSESSMENT: PAIN_FUNCTIONAL_ASSESSMENT: 0-10

## 2023-11-06 NOTE — PROGRESS NOTES
Patient awake and oriented. Weaned to room air, almonte in place, CBI clamped per orders. Urine red in color. Will make MD Love aware and await further orders. Denies pain, VS stable. Phase I discharge criteria met.

## 2023-11-06 NOTE — DISCHARGE INSTRUCTIONS
Call Dr. Marcelino Alfonso for any questions or concerns and to schedule a [facility]    Follow your surgeons instructions. Your urine may look pink and it may burn when you urinate. You may also feel like you have to urinate often. Call your surgeon if your temperature is higher than 101 degrees, your urine is grossly bloody, or has large clots. Drink plenty of fluids unless your physician advises against it. If you can not urinate once you are home, call your surgeon or go to the Emergency Room. If you are discharged with a catheter into your bladder follow instructions on care and removal. ( See cathter removal/ care sheet.)  Take medications as directed. Take pain medication with food. Do not drive or operate machinery while taking narcotics. Call your surgeon for any problems or questions    ANESTHESIA DISCHARGE INSTRUCTIONS    Wear your seatbelt home. You are under the influence of drugs-do not drink alcohol, drive, operate machinery, make any important decisions or sign any legal documents for 24 hours. Children should not ride bikes, [place] or play on gym sets for 24 hours after surgery. A responsible adult needs to be with you for 24 hours. You may experience lightheadedness, dizziness, or sleepiness following surgery. Rest at home today- increase activity as tolerated. Progress slowly to a regular diet unless your physician has instructed you otherwise. Drink plenty of water. If persistent nausea and vomiting becomes a problem, call your physician. Coughing, sore throat and muscle aches are other side effects of anesthesia, and should improve with time. Do not drive or operate machinery while taking narcotics. Females of childbearing potential and on hormonal birth control, should use two forms of contraception following procedure if given a medication called sugammadex and/or emend.  Additional contraception should be used for 7 days for

## 2023-11-06 NOTE — OP NOTE
Urology Operative Report  [facility]    Provider: Natalya Branham MD MD Patient ID:  Admission Date: 2023 Name: Shyla Fox Date: 2023  MRN: [id number]   Patient Location: OR/NONE : 1945  Attending: Natalya Branham MD Date of Service: 2023  PCP: June Piper MD     Date of Operation: 2023    Preoperative Diagnosis: BPH w obstruction    Postoperative Diagnosis: same    Procedure:    1. Rigid cystoscopy  2. Greenlight photovaporization of the prostate    Surgeon:   Natalya Branham MD\    Anesthesia: General LMA anesthesia    Indications: Jeovanny Klein is a 69 y.o. male who presents for the above named surgery. Informed consent was obtained and the risks, benefits, and details of the procedure were explained to the patient who elected to proceed. Details of Procedure: The patient was brought to the operating room and placed in the supine position on the operating room table. SCDs were placed on the lower extremities. Following induction of anesthesia the patient was positioned in a lithotomy position, all pressure points were padded, and the genitals were prepped and draped in the usual sterile fashion. A routine timeout was performed, confirming the patient, procedure, site, risk of fire, patient allergies and confirming that preoperative antibiotics had been administered prior to beginning. A cystoscope was used to perform an evaluation of the anatomy the prostate was enlarged with bi-lobar hypertrophy particularly with obstructed apical tissue and abnormal adenoma nodularity at the left bladder neck. The ureteral orifices were visualized and were away from the expected area of resection. The cystoscope was removed, and the resectoscope was placed. A greenlight fiber was used to create a channel at the 6 o'clock location until a trough was created to the veru montanum.  The lateral lobes were then taken down systematically until the prostatic capsule was

## 2023-11-06 NOTE — ANESTHESIA POSTPROCEDURE EVALUATION
Department of Anesthesiology  Postprocedure Note    Patient: Jeovanny Klein  MRN: [id number]  YOB: 1945  Date of evaluation: 11/6/2023      Procedure Summary     Date: 11/06/23 Room / Location: [address]    Anesthesia Start: 2355 Anesthesia Stop: 0913    Procedure: CYSTOSCOPY WITH GREENLIGHT PROCEDURE Diagnosis:       BPH with obstruction/lower urinary tract symptoms      (BPH with obstruction/lower urinary tract symptoms [N40.1, N13.8])    Surgeons: Natalya Branham MD Responsible Provider: Vinicius Garcia MD    Anesthesia Type: general ASA Status: 3          Anesthesia Type: No value filed.     Maria T Phase I: Maria T Score: 10    Maria T Phase II:        Anesthesia Post Evaluation    Patient location during evaluation: PACU  Patient participation: complete - patient participated  Level of consciousness: awake  Airway patency: patent  Nausea & Vomiting: no vomiting  Complications: no  Cardiovascular status: hemodynamically stable  Respiratory status: acceptable  Hydration status: euvolemic  Multimodal analgesia pain management approach

## 2023-11-06 NOTE — PROGRESS NOTES
Catheter corked    Discharge instructions reviewed and understanding verbalized per pt/family with copy given. All home medications/new prescriptions have been reviewed, questions answered and patient/family state understanding.  Medication information sheet provided for new prescriptions received when applicable

## 2023-11-06 NOTE — PROGRESS NOTES
PT DISCHARGED HOME WITH FAMILY, PT IN STABLE CONDITION, TRANSPORTED TO CAR VIA WHEELCHAIR WITH Dede RICHARDSON

## 2023-11-06 NOTE — PROGRESS NOTES
Pt admitted to PACU, not yet fully awake, oral airway removed upon arrival, simple mask8L remains in place, almonte in place, CBI infusing moderate/ fast speed, cherry urine in color. Vss., SR/SA on tele.  Will Cont to monitor

## 2023-11-06 NOTE — H&P
The history and physical exam was reviewed. The patient was seen and examined in pre-op. He had a chance to ask questions which were answered. There has been no interval changes. Plan to continue to the OR for greenlight laser PVP.     Natalya Branham MD  11/6/2023

## 2023-11-07 LAB
EKG ATRIAL RATE: 85 BPM
EKG DIAGNOSIS: NORMAL
EKG P AXIS: 78 DEGREES
EKG P-R INTERVAL: 200 MS
EKG Q-T INTERVAL: 394 MS
EKG QRS DURATION: 108 MS
EKG QTC CALCULATION (BAZETT): 409 MS
EKG R AXIS: 10 DEGREES
EKG T AXIS: 49 DEGREES
EKG VENTRICULAR RATE: 65 BPM

## 2023-11-07 PROCEDURE — 93010 ELECTROCARDIOGRAM REPORT: CPT | Performed by: INTERNAL MEDICINE

## 2023-12-29 ENCOUNTER — TELEPHONE (OUTPATIENT)
Dept: CARDIOLOGY CLINIC | Age: 78
End: 2023-12-29

## 2023-12-29 NOTE — TELEPHONE ENCOUNTER
Pt advised, v/u and stated he will call back at a later time to schedule f/u with MXA. Call complete.

## 2023-12-29 NOTE — TELEPHONE ENCOUNTER
Patients needs to follow up with urology as they discontinued medication following cystoscopy. He also needs to schedule appointment with EP    Physical Therapy Initial Evaluation  Subjective:    Patient Health History             Pertinent medical history includes: high blood pressure.     Medical allergies: other.   Surgeries include:  Orthopedic surgery (R knee).    Current medications:  High blood pressure medication.    Current occupation is Counselor.   Primary job tasks include:  Prolonged sitting (Taking care of home and grand children).                                    Objective:  System    Physical Exam    General     ROS    Assessment/Plan:

## 2023-12-29 NOTE — TELEPHONE ENCOUNTER
She States Pt has been experiencing hematuria with clots since cystoscopy procedure while taking xarleto and is asking if there is alternative medication that can be given.    Please advise

## 2024-01-10 ENCOUNTER — HOSPITAL ENCOUNTER (OUTPATIENT)
Dept: CT IMAGING | Age: 79
Discharge: HOME | End: 2024-01-10
Attending: INTERNAL MEDICINE
Payer: MEDICARE

## 2024-01-10 DIAGNOSIS — C83.08 SMALL CELL B-CELL LYMPHOMA OF LYMPH NODES OF MULTIPLE SITES (HCC): ICD-10-CM

## 2024-01-10 PROCEDURE — 74177 CT ABD & PELVIS W/CONTRAST: CPT

## 2024-01-10 PROCEDURE — 6360000004 HC RX CONTRAST MEDICATION: Performed by: INTERNAL MEDICINE

## 2024-01-10 RX ADMIN — IOHEXOL 50 ML: 240 INJECTION, SOLUTION INTRATHECAL; INTRAVASCULAR; INTRAVENOUS; ORAL at 15:24

## 2024-01-10 RX ADMIN — IOPAMIDOL 75 ML: 755 INJECTION, SOLUTION INTRAVENOUS at 15:25

## 2024-01-16 ENCOUNTER — HOSPITAL ENCOUNTER (OUTPATIENT)
Dept: CT IMAGING | Age: 79
Discharge: HOME | End: 2024-01-16
Attending: UROLOGY
Payer: MEDICARE

## 2024-01-16 DIAGNOSIS — R39.9 SYMPTOMS INVOLVING URINARY SYSTEM: ICD-10-CM

## 2024-01-16 DIAGNOSIS — R31.0 GROSS HEMATURIA: ICD-10-CM

## 2024-01-16 DIAGNOSIS — N40.1 BENIGN PROSTATIC HYPERPLASIA WITH LOWER URINARY TRACT SYMPTOMS, SYMPTOM DETAILS UNSPECIFIED: ICD-10-CM

## 2024-01-16 DIAGNOSIS — N39.0 URINARY TRACT INFECTION WITHOUT HEMATURIA, SITE UNSPECIFIED: ICD-10-CM

## 2024-01-16 DIAGNOSIS — I10 ESSENTIAL (PRIMARY) HYPERTENSION: ICD-10-CM

## 2024-01-16 DIAGNOSIS — R33.9 RETENTION OF URINE, UNSPECIFIED: ICD-10-CM

## 2024-01-16 LAB
BUN SERPL-MCNC: 19 MG/DL (ref 7–20)
CREAT SERPL-MCNC: 1 MG/DL (ref 0.8–1.3)
GFR SERPLBLD CREATININE-BSD FMLA CKD-EPI: >60 ML/MIN/{1.73_M2}

## 2024-01-16 PROCEDURE — 84520 ASSAY OF UREA NITROGEN: CPT

## 2024-01-16 PROCEDURE — 82565 ASSAY OF CREATININE: CPT

## 2024-01-16 PROCEDURE — 74178 CT ABD&PLV WO CNTR FLWD CNTR: CPT | Performed by: UROLOGY

## 2024-01-16 PROCEDURE — 6360000004 HC RX CONTRAST MEDICATION: Performed by: UROLOGY

## 2024-01-16 PROCEDURE — 36415 COLL VENOUS BLD VENIPUNCTURE: CPT

## 2024-01-16 RX ADMIN — IOPAMIDOL 120 ML: 755 INJECTION, SOLUTION INTRAVENOUS at 15:51

## 2024-04-01 ENCOUNTER — HOSPITAL ENCOUNTER (OUTPATIENT)
Dept: CT IMAGING | Age: 79
Discharge: HOME | End: 2024-04-01
Attending: INTERNAL MEDICINE
Payer: MEDICARE

## 2024-04-01 DIAGNOSIS — Z87.891 PERSONAL HISTORY OF TOBACCO USE: ICD-10-CM

## 2024-04-01 PROCEDURE — 71271 CT THORAX LUNG CANCER SCR C-: CPT

## 2024-04-05 ENCOUNTER — HOSPITAL ENCOUNTER (EMERGENCY)
Age: 79
Discharge: HOME | End: 2024-04-05
Payer: MEDICARE

## 2024-04-05 VITALS
TEMPERATURE: 98 F | WEIGHT: 165 LBS | HEIGHT: 69 IN | BODY MASS INDEX: 24.44 KG/M2 | DIASTOLIC BLOOD PRESSURE: 68 MMHG | SYSTOLIC BLOOD PRESSURE: 115 MMHG | HEART RATE: 91 BPM | RESPIRATION RATE: 16 BRPM | OXYGEN SATURATION: 97 %

## 2024-04-05 DIAGNOSIS — T14.8XXA MULTIPLE SKIN TEARS: Primary | ICD-10-CM

## 2024-04-05 PROCEDURE — 99282 EMERGENCY DEPT VISIT SF MDM: CPT

## 2024-04-05 ASSESSMENT — ENCOUNTER SYMPTOMS
VOMITING: 0
COUGH: 0
ABDOMINAL PAIN: 0
SHORTNESS OF BREATH: 0
NAUSEA: 0
WHEEZING: 0
DIARRHEA: 0

## 2024-04-05 ASSESSMENT — PAIN DESCRIPTION - PAIN TYPE: TYPE: ACUTE PAIN

## 2024-04-05 ASSESSMENT — PAIN DESCRIPTION - LOCATION: LOCATION: HAND

## 2024-04-05 ASSESSMENT — PAIN - FUNCTIONAL ASSESSMENT: PAIN_FUNCTIONAL_ASSESSMENT: 0-10

## 2024-04-05 ASSESSMENT — PAIN SCALES - GENERAL: PAINLEVEL_OUTOF10: 1

## 2024-04-05 ASSESSMENT — PAIN DESCRIPTION - ORIENTATION: ORIENTATION: RIGHT

## 2024-04-05 NOTE — ED PROVIDER NOTES
[facility] EMERGENCY DEPARTMENT  EMERGENCY DEPARTMENT ENCOUNTER        Pt Name: Frank Beatty  MRN: [id number]  Birthdate 1945  Date of evaluation: 4/5/2024  Provider: Billie Grajeda PA-C  PCP: Magno Valencia MD  Note Started: 3:45 PM EDT 4/5/24      LUIS. I have evaluated this patient.        CHIEF COMPLAINT       Chief Complaint   Patient presents with    Laceration     Missed a step and fell off one step has skin tear to rt elbow, and laceration to rt hand 3rd digit, minimal bleeding, denies hitting head       HISTORY OF PRESENT ILLNESS: 1 or more Elements     History From: patient   Limitations to history : None    Frank Beatty is a 78 y.o. male who presents for evaluation of multiple injuries status post fall.  Patient states he was walking down stairs into the basement/laundry over the next the last step and fell.  He landed on right outstretched hand.  Has bruising to his palm and small left leg laceration to his right third digit.  Also with skin tears and bruising to his right elbow.  Bleeding is controlled.  He is anticoagulated on Eliquis.  He denies hitting his head or any loss of consciousness.  No neck or back pain.  No numbness tingling weakness distally.  Tetanus is up-to-date.  No other injuries or complaints at this time.    Nursing Notes were all reviewed and agreed with or any disagreements were addressed in the HPI.    REVIEW OF SYSTEMS :      Review of Systems   Constitutional:  Negative for appetite change, chills and fever.   HENT:  Negative for congestion and rhinorrhea.    Respiratory:  Negative for cough, shortness of breath and wheezing.    Cardiovascular:  Negative for chest pain.   Gastrointestinal:  Negative for abdominal pain, diarrhea, nausea and vomiting.   Genitourinary:  Negative for difficulty urinating, dysuria and hematuria.   Musculoskeletal:  Negative for neck pain and neck stiffness.   Skin:  Positive for wound. Negative for rash.   Neurological:

## 2024-04-15 ENCOUNTER — TELEPHONE (OUTPATIENT)
Dept: PHARMACY | Facility: CLINIC | Age: 79
End: 2024-04-15

## 2024-04-15 RX ORDER — METOPROLOL SUCCINATE 25 MG/1
25 TABLET, EXTENDED RELEASE ORAL DAILY
Qty: 90 TABLET | Refills: 0 | Status: SHIPPED | OUTPATIENT
Start: 2024-04-15

## 2024-04-15 NOTE — TELEPHONE ENCOUNTER
[facility]   10/26/23 Office Visit Magno Valencia MD [facility]   23 Office Visit Magno Valencia MD [facility]   07/10/23 Office Visit Magno Valencia MD [facility]   23 Office Visit Magno Valencia MD [facility]   23 Office Visit Magno Valencia MD [facility]   Showing recent visits within past 540 days with a meds authorizing provider and meeting all other requirements  Future Appointments  Date Type Provider Dept   24 Appointment Magno Valencia MD [facility]   Showing future appointments within next 150 days with a meds authorizing provider and meeting all other requirements    Future Appointments   Date Time Provider Department Center   2024  9:30 AM Louis Block MD [facility]   2024 11:15 AM Magno Valencia MD [facility]       [facility]  [facility] // Department, toll free [phone], Option 3    For Pharmacy Admin Tracking Only    Program: [facility] [email or web address]  CPA in place:  No  Recommendation Provided To: Patient/Caregiver: 1 via Telephone and Pharmacy: 1  Intervention Detail: Adherence Monitorin  Intervention Accepted By: Patient/Caregiver: 1 and Pharmacy: 1  Gap Closed?: Yes   Time Spent (min): 10

## 2024-04-18 ENCOUNTER — OFFICE VISIT (OUTPATIENT)
Dept: PULMONOLOGY | Age: 79
End: 2024-04-18
Payer: MEDICARE

## 2024-04-18 VITALS
HEART RATE: 88 BPM | SYSTOLIC BLOOD PRESSURE: 110 MMHG | WEIGHT: 166.2 LBS | BODY MASS INDEX: 24.54 KG/M2 | OXYGEN SATURATION: 96 % | DIASTOLIC BLOOD PRESSURE: 70 MMHG

## 2024-04-18 DIAGNOSIS — Z87.891 PERSONAL HISTORY OF TOBACCO USE: ICD-10-CM

## 2024-04-18 DIAGNOSIS — R91.1 LUNG NODULE: ICD-10-CM

## 2024-04-18 DIAGNOSIS — G47.33 OSA (OBSTRUCTIVE SLEEP APNEA): Primary | ICD-10-CM

## 2024-04-18 DIAGNOSIS — I10 ESSENTIAL HYPERTENSION: ICD-10-CM

## 2024-04-18 PROCEDURE — 3078F DIAST BP <80 MM HG: CPT | Performed by: INTERNAL MEDICINE

## 2024-04-18 PROCEDURE — 99214 OFFICE O/P EST MOD 30 MIN: CPT | Performed by: INTERNAL MEDICINE

## 2024-04-18 PROCEDURE — 1123F ACP DISCUSS/DSCN MKR DOCD: CPT | Performed by: INTERNAL MEDICINE

## 2024-04-18 PROCEDURE — 3074F SYST BP LT 130 MM HG: CPT | Performed by: INTERNAL MEDICINE

## 2024-04-18 RX ORDER — RISPERIDONE 0.25 MG/1
0.25 TABLET ORAL 2 TIMES DAILY
COMMUNITY

## 2024-04-18 ASSESSMENT — SLEEP AND FATIGUE QUESTIONNAIRES
HOW LIKELY ARE YOU TO NOD OFF OR FALL ASLEEP WHILE WATCHING TV: WOULD NEVER DOZE
HOW LIKELY ARE YOU TO NOD OFF OR FALL ASLEEP IN A CAR, WHILE STOPPED FOR A FEW MINUTES IN TRAFFIC: WOULD NEVER DOZE
HOW LIKELY ARE YOU TO NOD OFF OR FALL ASLEEP WHILE LYING DOWN TO REST IN THE AFTERNOON WHEN CIRCUMSTANCES PERMIT: HIGH CHANCE OF DOZING
HOW LIKELY ARE YOU TO NOD OFF OR FALL ASLEEP WHILE SITTING QUIETLY AFTER LUNCH WITHOUT ALCOHOL: WOULD NEVER DOZE
HOW LIKELY ARE YOU TO NOD OFF OR FALL ASLEEP WHILE SITTING INACTIVE IN A PUBLIC PLACE: WOULD NEVER DOZE
HOW LIKELY ARE YOU TO NOD OFF OR FALL ASLEEP WHEN YOU ARE A PASSENGER IN A CAR FOR AN HOUR WITHOUT A BREAK: WOULD NEVER DOZE
HOW LIKELY ARE YOU TO NOD OFF OR FALL ASLEEP WHILE SITTING AND READING: WOULD NEVER DOZE
ESS TOTAL SCORE: 3
HOW LIKELY ARE YOU TO NOD OFF OR FALL ASLEEP WHILE SITTING AND TALKING TO SOMEONE: WOULD NEVER DOZE

## 2024-04-18 NOTE — PROGRESS NOTES
PULMONARY OFFICE NEW PATIENT VISIT    CONSULTING PHYSICIAN:  Magno Valencia MD     REASON FOR VISIT:   Chief Complaint   Patient presents with    Sleep Apnea    Pulmonary Nodule         DATE OF VISIT: 4/18/2024    HISTORY OF PRESENT ILLNESS: 78 y.o. year old male comes in with pulmonary/sleep clinic for a follow-up.  Patient has stopped using his ASV therapy altogether.  As per the patient, he sleeps on his belly and 1 night he woke up while he was chewing his mask.  This created a hole in the mask and he could not use it.  He did get a replacement mask interface but thereafter did not use the therapy at all.  Does not seem motivated to use the ASV therapy again.  Remains quit from smoking.  Breathing remained stable.  Weight is stable.    Previously: Patient has started using his ASV machine very regularly.  Feels benefited from it.  Sleeping better.  Mask continues to leak.  Moles on his face and he has to keep on adjusting it.  Still undergoing chemotherapy for his diagnosis of CLL.  Has quit smoking since November 2022.  Remains active.  Weight remains stable. Since the last clinic visit patient underwent axillary lymph node biopsy which showed CLL findings.  Currently getting chemotherapy by medical oncology.  Additionally suffered with urinary retention/UTI and underwent UroLift procedure.  Because of these medical issues he has been unable to use the ASV therapy regularly.  Additionally has found that his mask is leaking and not fitting him well.  Humidification was also turned up high which caused him to feel warm air coming through the machine.  Weight remains stable.   Continues to smoke about 5 to 6 cigarettes daily.  Smokes more when he is under a lot of physical or emotional stress.  Stays motivated to quit.  Patient reports that he could not use the BiPAP therapy despite trying different masks and following return the machine.  Currently able to sleep for 8 to 10 hours during daytime.

## 2024-06-11 ENCOUNTER — HOSPITAL ENCOUNTER (OUTPATIENT)
Age: 79
Discharge: HOME | End: 2024-06-11
Payer: MEDICARE

## 2024-06-11 DIAGNOSIS — R10.84 GENERALIZED ABDOMINAL PAIN: ICD-10-CM

## 2024-06-11 DIAGNOSIS — Z12.5 PROSTATE CANCER SCREENING: ICD-10-CM

## 2024-06-11 DIAGNOSIS — E78.00 PURE HYPERCHOLESTEROLEMIA: ICD-10-CM

## 2024-06-11 LAB
ALBUMIN SERPL-MCNC: 4 G/DL (ref 3.4–5)
ALBUMIN/GLOB SERPL: 1.6 {RATIO} (ref 1.1–2.2)
ALP SERPL-CCNC: 100 U/L (ref 40–129)
ALT SERPL-CCNC: 21 U/L (ref 10–40)
ANION GAP SERPL CALCULATED.3IONS-SCNC: 8 MMOL/L (ref 3–16)
AST SERPL-CCNC: 17 U/L (ref 15–37)
BILIRUB SERPL-MCNC: 0.6 MG/DL (ref 0–1)
BUN SERPL-MCNC: 19 MG/DL (ref 7–20)
CALCIUM SERPL-MCNC: 9.2 MG/DL (ref 8.3–10.6)
CHLORIDE SERPL-SCNC: 107 MMOL/L (ref 99–110)
CO2 SERPL-SCNC: 25 MMOL/L (ref 21–32)
CREAT SERPL-MCNC: 1 MG/DL (ref 0.8–1.3)
DEPRECATED RDW RBC AUTO: 15 % (ref 12.4–15.4)
GFR SERPLBLD CREATININE-BSD FMLA CKD-EPI: 77 ML/MIN/{1.73_M2}
GLUCOSE SERPL-MCNC: 97 MG/DL (ref 70–99)
HCT VFR BLD AUTO: 42 % (ref 40.5–52.5)
HDLC SERPL-MCNC: 47 MG/DL (ref 40–60)
HGB BLD-MCNC: 14.1 G/DL (ref 13.5–17.5)
IRON SATN MFR SERPL: 30 % (ref 20–50)
IRON SERPL-MCNC: 76 UG/DL (ref 59–158)
LDLC SERPL CALC-MCNC: 106 MG/DL
MCH RBC QN AUTO: 29.4 PG (ref 26–34)
MCHC RBC AUTO-ENTMCNC: 33.5 G/DL (ref 31–36)
MCV RBC AUTO: 87.7 FL (ref 80–100)
PLATELET # BLD AUTO: 212 K/UL (ref 135–450)
PMV BLD AUTO: 9.7 FL (ref 5–10.5)
POTASSIUM SERPL-SCNC: 4.6 MMOL/L (ref 3.5–5.1)
PROT SERPL-MCNC: 6.5 G/DL (ref 6.4–8.2)
PSA SERPL DL<=0.01 NG/ML-MCNC: 0.05 NG/ML (ref 0–4)
RBC # BLD AUTO: 4.79 M/UL (ref 4.2–5.9)
SODIUM SERPL-SCNC: 140 MMOL/L (ref 136–145)
T4 SERPL-MCNC: 9.9 UG/DL (ref 4.5–10.9)
TIBC SERPL-MCNC: 252 UG/DL (ref 260–445)
TRIGL SERPL-MCNC: 178 MG/DL (ref 0–150)
TSH SERPL DL<=0.005 MIU/L-ACNC: 1.38 UIU/ML (ref 0.27–4.2)
VLDLC SERPL CALC-MCNC: 36 MG/DL
WBC # BLD AUTO: 6.8 K/UL (ref 4–11)

## 2024-06-11 PROCEDURE — 80061 LIPID PANEL: CPT

## 2024-06-11 PROCEDURE — 83540 ASSAY OF IRON: CPT

## 2024-06-11 PROCEDURE — 84443 ASSAY THYROID STIM HORMONE: CPT

## 2024-06-11 PROCEDURE — 36415 COLL VENOUS BLD VENIPUNCTURE: CPT

## 2024-06-11 PROCEDURE — 80053 COMPREHEN METABOLIC PANEL: CPT

## 2024-06-11 PROCEDURE — 83550 IRON BINDING TEST: CPT

## 2024-06-11 PROCEDURE — 85027 COMPLETE CBC AUTOMATED: CPT

## 2024-06-11 PROCEDURE — 84436 ASSAY OF TOTAL THYROXINE: CPT

## 2024-06-11 PROCEDURE — 84153 ASSAY OF PSA TOTAL: CPT

## 2024-08-30 ENCOUNTER — TELEPHONE (OUTPATIENT)
Dept: PHARMACY | Facility: CLINIC | Age: 79
End: 2024-08-30

## 2024-09-09 PROBLEM — J44.9 COPD (CHRONIC OBSTRUCTIVE PULMONARY DISEASE) (HCC): Status: ACTIVE | Noted: 2024-09-09

## 2024-11-11 PROBLEM — C82.90: Status: RESOLVED | Noted: 2023-05-02 | Resolved: 2024-11-11

## 2024-11-29 ENCOUNTER — TELEPHONE (OUTPATIENT)
Dept: PHARMACY | Facility: CLINIC | Age: 79
End: 2024-11-29

## 2024-11-29 NOTE — TELEPHONE ENCOUNTER
[facility] CLINICAL PHARMACY: ADHERENCE REVIEW  Identified care gap per [place]: fills at [facility]: Statin adherence      ASSESSMENT  STATIN ADHERENCE    Insurance Records claims through 24 (Prior Year PDC = not reported; YTD PDC = 78%; Potential Fail Date: 12/10/24):   ATORVASTATIN 10 MG Next refill due: 24    Prescribed si.5T QD    Per Insurer Portal: last filled on 24 for 90 day supply. Writer pushed refill at that time    Per [facility]:  will send refill auth    [id number]    Lab Results   Component Value Date    CHOL 189 2024    TRIG 178 (H) 2024    HDL 47 2024     Lab Results   Component Value Date     (H) 2024      ALT   Date Value Ref Range Status   2024 21 10 - 40 U/L Final     AST   Date Value Ref Range Status   2024 17 15 - 37 U/L Final     The 10-year ASCVD risk score (Jarrod DK, et al., 2019) is: 27.5%    Values used to calculate the score:      Age: 79 years      Sex: Male      Is Non- : No      Diabetic: No      Tobacco smoker: No      Systolic Blood Pressure: 122 mmHg      Is BP treated: No      HDL Cholesterol: 47 mg/dL      Total Cholesterol: 189 mg/dL       The following are interventions that have been identified:   Patient OVERDUE refilling ATORVASTATIN 10 MG and active on home medication list.     Will outreach after approval    Last Visit: 24  Next Visit: 25    Ana M Bang CPhT.   [facility]   [facility]  Toll free: [phone] Option 1

## 2024-12-02 NOTE — TELEPHONE ENCOUNTER
Paid claim 11/29/24    For Pharmacy Admin Tracking Only    Program: [employer]  CPA in place:  No  Recommendation Provided To: Provider: 1 via Note to Provider and Pharmacy: 1  Intervention Detail: New Rx: 1, reason: Improve Adherence  Intervention Accepted By: Provider: 1 and Pharmacy: 1  Gap Closed?: Yes   Time Spent (min): 15

## 2025-01-13 PROBLEM — F32.2 CURRENT SEVERE EPISODE OF MAJOR DEPRESSIVE DISORDER WITHOUT PSYCHOTIC FEATURES WITHOUT PRIOR EPISODE (HCC): Status: ACTIVE | Noted: 2025-01-13

## 2025-03-10 ENCOUNTER — CLINICAL DOCUMENTATION (OUTPATIENT)
Dept: CASE MANAGEMENT | Age: 80
End: 2025-03-10

## 2025-03-10 NOTE — PROGRESS NOTES
Patient meets lung screening criteria. Patient due for annual CT Lung Screening. First reminder letter mailed. If ordered, Patient may call [phone] to schedule.     Lung Screen Criteria  Age 50-80  Current smoker or quit within the last 15 years  Has => 20 pack year history.    Future Appointments   Date Time Provider Department Center   3/10/2025 11:15 AM Magno Valencia MD [facility]   4/17/2025  9:30 AM Louis Block MD [facility]       Active Lung Screen order on chart: Yes

## 2025-04-17 ENCOUNTER — OFFICE VISIT (OUTPATIENT)
Dept: PULMONOLOGY | Age: 80
End: 2025-04-17
Payer: MEDICARE

## 2025-04-17 VITALS
HEART RATE: 74 BPM | OXYGEN SATURATION: 99 % | HEIGHT: 69 IN | WEIGHT: 167 LBS | BODY MASS INDEX: 24.73 KG/M2 | DIASTOLIC BLOOD PRESSURE: 70 MMHG | SYSTOLIC BLOOD PRESSURE: 110 MMHG

## 2025-04-17 DIAGNOSIS — G47.33 OSA (OBSTRUCTIVE SLEEP APNEA): Primary | ICD-10-CM

## 2025-04-17 DIAGNOSIS — I10 ESSENTIAL HYPERTENSION: ICD-10-CM

## 2025-04-17 PROCEDURE — G2211 COMPLEX E/M VISIT ADD ON: HCPCS | Performed by: INTERNAL MEDICINE

## 2025-04-17 PROCEDURE — 99214 OFFICE O/P EST MOD 30 MIN: CPT | Performed by: INTERNAL MEDICINE

## 2025-04-17 PROCEDURE — 1159F MED LIST DOCD IN RCRD: CPT | Performed by: INTERNAL MEDICINE

## 2025-04-17 PROCEDURE — 3074F SYST BP LT 130 MM HG: CPT | Performed by: INTERNAL MEDICINE

## 2025-04-17 PROCEDURE — 1160F RVW MEDS BY RX/DR IN RCRD: CPT | Performed by: INTERNAL MEDICINE

## 2025-04-17 PROCEDURE — 3078F DIAST BP <80 MM HG: CPT | Performed by: INTERNAL MEDICINE

## 2025-04-17 PROCEDURE — 1123F ACP DISCUSS/DSCN MKR DOCD: CPT | Performed by: INTERNAL MEDICINE

## 2025-04-17 RX ORDER — VIBEGRON 75 MG/1
75 TABLET, FILM COATED ORAL DAILY
COMMUNITY
Start: 2025-04-07

## 2025-04-17 RX ORDER — RISPERIDONE 1 MG/1
1 TABLET ORAL 2 TIMES DAILY
COMMUNITY
Start: 2025-04-14

## 2025-04-17 RX ORDER — VENLAFAXINE HYDROCHLORIDE 150 MG/1
150 CAPSULE, EXTENDED RELEASE ORAL DAILY
COMMUNITY
Start: 2025-04-14

## 2025-04-17 ASSESSMENT — SLEEP AND FATIGUE QUESTIONNAIRES
ESS TOTAL SCORE: 2
HOW LIKELY ARE YOU TO NOD OFF OR FALL ASLEEP WHILE SITTING INACTIVE IN A PUBLIC PLACE: WOULD NEVER DOZE
HOW LIKELY ARE YOU TO NOD OFF OR FALL ASLEEP WHILE SITTING AND READING: WOULD NEVER DOZE
HOW LIKELY ARE YOU TO NOD OFF OR FALL ASLEEP WHEN YOU ARE A PASSENGER IN A CAR FOR AN HOUR WITHOUT A BREAK: WOULD NEVER DOZE
HOW LIKELY ARE YOU TO NOD OFF OR FALL ASLEEP WHILE LYING DOWN TO REST IN THE AFTERNOON WHEN CIRCUMSTANCES PERMIT: MODERATE CHANCE OF DOZING
HOW LIKELY ARE YOU TO NOD OFF OR FALL ASLEEP IN A CAR, WHILE STOPPED FOR A FEW MINUTES IN TRAFFIC: WOULD NEVER DOZE
HOW LIKELY ARE YOU TO NOD OFF OR FALL ASLEEP WHILE WATCHING TV: WOULD NEVER DOZE
HOW LIKELY ARE YOU TO NOD OFF OR FALL ASLEEP WHILE SITTING QUIETLY AFTER LUNCH WITHOUT ALCOHOL: WOULD NEVER DOZE
HOW LIKELY ARE YOU TO NOD OFF OR FALL ASLEEP WHILE SITTING AND TALKING TO SOMEONE: WOULD NEVER DOZE

## 2025-04-17 NOTE — PROGRESS NOTES
PULMONARY OFFICE NEW PATIENT VISIT    CONSULTING PHYSICIAN:  Magno Valencia MD     REASON FOR VISIT:   Chief Complaint   Patient presents with    Sleep Apnea    Pulmonary Nodule         DATE OF VISIT: 4/17/2025    HISTORY OF PRESENT ILLNESS: 79 y.o. year old male comes in with pulmonary/sleep clinic for a follow-up.  Reports that he has not used his ASV therapy since November 2024.  He mostly sleeps on his belly and the fullface mask pushes into his eyes.  This aggravates him and he finally stopped using the therapy altogether.  Without the therapy has been sleeping okay.  Reports that he recently lost his  and that has made him very depressed.  He has been sleeping close to about 12 to 14 hours every day.  Breathing remains stable.  Remains quit from smoking.  Weight is stable.        Previously: Patient has stopped using his ASV therapy altogether.  As per the patient, he sleeps on his belly and 1 night he woke up while he was chewing his mask.  This created a hole in the mask and he could not use it.  He did get a replacement mask interface but thereafter did not use the therapy at all.  Does not seem motivated to use the ASV therapy again.  Remains quit from smoking.  Breathing remained stable.  Weight is stable. Patient has started using his ASV machine very regularly.  Feels benefited from it.  Sleeping better.  Mask continues to leak.  Moles on his face and he has to keep on adjusting it.  Still undergoing chemotherapy for his diagnosis of CLL.  Has quit smoking since November 2022.  Remains active.  Weight remains stable. Since the last clinic visit patient underwent axillary lymph node biopsy which showed CLL findings.  Currently getting chemotherapy by medical oncology.  Additionally suffered with urinary retention/UTI and underwent UroLift procedure.  Because of these medical issues he has been unable to use the ASV therapy regularly.  Additionally has found that his mask is leaking and

## 2025-05-06 ENCOUNTER — CLINICAL DOCUMENTATION (OUTPATIENT)
Dept: CASE MANAGEMENT | Age: 80
End: 2025-05-06

## 2025-05-06 NOTE — PROGRESS NOTES
Patient meets lung screening criteria. Patient due for annual CT Lung Screening. Second reminder letter mailed. If ordered, Patient may call [phone] to schedule.     Lung Screen Criteria  Age 50-80  Current smoker or quit within the last 15 years  Has => 20 pack year history.    Future Appointments   Date Time Provider Department Center   5/8/2025 10:00 AM Magno Valencia MD [facility]   4/16/2026 10:30 AM Louis Block MD [facility]       Active Lung Screen order on chart: Yes

## 2025-05-21 ENCOUNTER — HOSPITAL ENCOUNTER (OUTPATIENT)
Age: 80
Discharge: HOME | End: 2025-05-21
Payer: MEDICARE

## 2025-05-21 ENCOUNTER — HOSPITAL ENCOUNTER (OUTPATIENT)
Dept: CT IMAGING | Age: 80
Discharge: HOME | End: 2025-05-21
Attending: INTERNAL MEDICINE
Payer: MEDICARE

## 2025-05-21 DIAGNOSIS — R91.1 LUNG NODULE: ICD-10-CM

## 2025-05-21 LAB
ALBUMIN SERPL-MCNC: 3.8 G/DL (ref 3.4–5)
ALBUMIN/GLOB SERPL: 1.7 {RATIO} (ref 1.1–2.2)
ALP SERPL-CCNC: 80 U/L (ref 40–129)
ALT SERPL-CCNC: 17 U/L (ref 10–40)
ANION GAP SERPL CALCULATED.3IONS-SCNC: 7 MMOL/L (ref 3–16)
AST SERPL-CCNC: 19 U/L (ref 15–37)
BILIRUB SERPL-MCNC: 0.7 MG/DL (ref 0–1)
BUN SERPL-MCNC: 17 MG/DL (ref 7–20)
CALCIUM SERPL-MCNC: 8.8 MG/DL (ref 8.3–10.6)
CHLORIDE SERPL-SCNC: 106 MMOL/L (ref 99–110)
CHOLEST SERPL-MCNC: 192 MG/DL (ref 0–199)
CO2 SERPL-SCNC: 27 MMOL/L (ref 21–32)
CREAT SERPL-MCNC: 1.2 MG/DL (ref 0.8–1.3)
DEPRECATED RDW RBC AUTO: 13.7 % (ref 12.4–15.4)
GFR SERPLBLD CREATININE-BSD FMLA CKD-EPI: 62 ML/MIN/{1.73_M2}
GLUCOSE SERPL-MCNC: 102 MG/DL (ref 70–99)
HCT VFR BLD AUTO: 42.3 % (ref 40.5–52.5)
HDLC SERPL-MCNC: 53 MG/DL (ref 40–60)
HGB BLD-MCNC: 14.2 G/DL (ref 13.5–17.5)
LDLC SERPL CALC-MCNC: 108 MG/DL
MCH RBC QN AUTO: 30 PG (ref 26–34)
MCHC RBC AUTO-ENTMCNC: 33.5 G/DL (ref 31–36)
MCV RBC AUTO: 89.4 FL (ref 80–100)
PLATELET # BLD AUTO: 164 K/UL (ref 135–450)
PMV BLD AUTO: 9.3 FL (ref 5–10.5)
POTASSIUM SERPL-SCNC: 4.6 MMOL/L (ref 3.5–5.1)
PROT SERPL-MCNC: 6 G/DL (ref 6.4–8.2)
RBC # BLD AUTO: 4.73 M/UL (ref 4.2–5.9)
SODIUM SERPL-SCNC: 140 MMOL/L (ref 136–145)
TRIGL SERPL-MCNC: 156 MG/DL (ref 0–150)
VLDLC SERPL CALC-MCNC: 31 MG/DL
WBC # BLD AUTO: 5.4 K/UL (ref 4–11)

## 2025-05-21 PROCEDURE — 80053 COMPREHEN METABOLIC PANEL: CPT

## 2025-05-21 PROCEDURE — 80061 LIPID PANEL: CPT

## 2025-05-21 PROCEDURE — 6360000004 HC RX CONTRAST MEDICATION: Performed by: INTERNAL MEDICINE

## 2025-05-21 PROCEDURE — 71260 CT THORAX DX C+: CPT

## 2025-05-21 PROCEDURE — 85027 COMPLETE CBC AUTOMATED: CPT

## 2025-05-21 PROCEDURE — 36415 COLL VENOUS BLD VENIPUNCTURE: CPT

## 2025-05-21 RX ORDER — IOPAMIDOL 755 MG/ML
75 INJECTION, SOLUTION INTRAVASCULAR
Status: COMPLETED | OUTPATIENT
Start: 2025-05-21 | End: 2025-05-21

## 2025-05-21 RX ADMIN — IOPAMIDOL 75 ML: 755 INJECTION, SOLUTION INTRAVENOUS at 11:43

## 2025-06-19 ENCOUNTER — HOSPITAL ENCOUNTER (OUTPATIENT)
Dept: MRI IMAGING | Age: 80
Discharge: HOME | End: 2025-06-19
Attending: INTERNAL MEDICINE
Payer: MEDICARE

## 2025-06-19 DIAGNOSIS — R29.898 BILATERAL LEG WEAKNESS: ICD-10-CM

## 2025-06-19 DIAGNOSIS — M54.17 LUMBOSACRAL RADICULOPATHY AT L4: ICD-10-CM

## 2025-06-19 PROCEDURE — 72148 MRI LUMBAR SPINE W/O DYE: CPT

## (undated) DEVICE — APPLICATOR MEDICATED 26 CC SOLUTION HI LT ORNG CHLORAPREP

## (undated) DEVICE — Device: Brand: MEDEX

## (undated) DEVICE — TUBING, SUCTION, 1/4" X 10', STRAIGHT: Brand: MEDLINE

## (undated) DEVICE — HYPODERMIC SAFETY NEEDLE: Brand: MAGELLAN

## (undated) DEVICE — CATHETER URETH 20FR 30CC BLLN SIL ELASTMR F 2 W

## (undated) DEVICE — BAG,DRAINAGE,4L,A/R TOWER,LL,SLIDE TAP: Brand: MEDLINE

## (undated) DEVICE — GLOVE ORANGE PI 7 1/2   MSG9075

## (undated) DEVICE — BLANKET WRM W29.9XL79.1IN UP BODY FORC AIR MISTRAL-AIR

## (undated) DEVICE — SYRINGE MED 30ML STD CLR PLAS LUERLOCK TIP N CTRL DISP

## (undated) DEVICE — DRAPE ADOLESCENT  LAPAROTOMY

## (undated) DEVICE — PACK PROCEDURE SURG EXTREMITY MFFOP CUST

## (undated) DEVICE — ELECTRODE PT RET AD L9FT HI MOIST COND ADH HYDRGEL CORDED

## (undated) DEVICE — PENCIL SMK EVAC TELSCP 3 M TBNG

## (undated) DEVICE — AVANOS* EXTENSION SETS: Brand: EXTENSION SET, MINI BORE, 12" LENGTH, 0.50ML VOLUME 25

## (undated) DEVICE — 1010 S-DRAPE TOWEL DRAPE 10/BX: Brand: STERI-DRAPE™

## (undated) DEVICE — MEDICINE CUP, GRADUATED, STER: Brand: MEDLINE

## (undated) DEVICE — TOWEL,OR,DSP,ST,BLUE,STD,4/PK,20PK/CS: Brand: MEDLINE

## (undated) DEVICE — SYSTEM SKIN CLSR 22CM DERMBND PRINEO

## (undated) DEVICE — COVER LT HNDL BLU PLAS

## (undated) DEVICE — WET SKIN PREP TRAY: Brand: MEDLINE INDUSTRIES, INC.

## (undated) DEVICE — SUTURE VCRL + SZ 3-0 L18IN ABSRB UD SH 1/2 CIR TAPERCUT NDL VCP864D

## (undated) DEVICE — 3M™ IOBAN™ 2 ANTIMICROBIAL INCISE DRAPE 6650EZ: Brand: IOBAN™ 2

## (undated) DEVICE — CYSTO PACK: Brand: MEDLINE INDUSTRIES, INC.

## (undated) DEVICE — MAJOR SET UP PK

## (undated) DEVICE — PENCIL ES L3M BTTN SWCH S STL HEX LOK BLDE ELECTRD HOLSTER

## (undated) DEVICE — GAUZE,SPONGE,4"X4",8PLY,STRL,LF,10/TRAY: Brand: MEDLINE

## (undated) DEVICE — LASER SURG W22XH58IN D36IN 475LB SLD STATE FREQ DOUBLED

## (undated) DEVICE — NEEDLE SPNL 22GA L3.5IN BLK HUB S STL REG WALL FIT STYL W/

## (undated) DEVICE — SET INFUS 25ML L117IN PMP MOD CK VLV RLER CLMP 2 SMRTSITE

## (undated) DEVICE — WIPE INSTR W73XL73CM VISITEC

## (undated) DEVICE — DRAPE,SPLIT ,77X120: Brand: MEDLINE

## (undated) DEVICE — SHEET,DRAPE,53X77,STERILE: Brand: MEDLINE

## (undated) DEVICE — TOWEL,OR,DSP,ST,BLUE,STD,6/PK,12PK/CS: Brand: MEDLINE

## (undated) DEVICE — C-ARM: Brand: UNBRANDED

## (undated) DEVICE — STERILE POLYISOPRENE POWDER-FREE SURGICAL GLOVES: Brand: PROTEXIS

## (undated) DEVICE — SOLUTION IRRIG 2000ML 0.9% SOD CHL USP UROMATIC PLAS CONT

## (undated) DEVICE — SUTURE VCRL + SZ 4-0 L18IN ABSRB UD L19MM PS-2 3/8 CIR PRIM VCP496H

## (undated) DEVICE — ADHESIVE SKIN CLSR 0.7ML TOP DERMBND ADV

## (undated) DEVICE — SOLUTION IRRIG 1000ML STRL H2O USP PLAS POUR BTL

## (undated) DEVICE — 3M™ TEGADERM™ TRANSPARENT FILM DRESSING FRAME STYLE, 1626W, 4 IN X 4-3/4 IN (10 CM X 12 CM), 50/CT 4CT/CASE: Brand: 3M™ TEGADERM™

## (undated) DEVICE — BLADE ES ELASTOMERIC COAT INSUL DURABLE BEND UPTO 90DEG

## (undated) DEVICE — BAG DRAINAGE NS

## (undated) DEVICE — SYRINGE MED 10ML SLIP TIP BLNT FILL AND LUERLOCK DISP

## (undated) DEVICE — Device: Brand: MILD DEVICE KIT

## (undated) DEVICE — SET EXTN PRIMING 4.9ML L30IN INCL SLDE CLMP SPIN M LUERLOCK

## (undated) DEVICE — CATHETER URETH 18FR BLLN 5CC SIL HYDRGEL 2 W F SPEC CARS M

## (undated) DEVICE — SOLUTION IRRIG 500ML 0.9% SOD CHL USP POUR PLAS BTL

## (undated) DEVICE — CATHETER URETH 22FR BLLN 30CC 3 W F SPEC INF CTRL BARDX

## (undated) DEVICE — CONTAINER,SPECIMEN,OR STERILE,4OZ: Brand: MEDLINE

## (undated) DEVICE — SOLUTION IRRIG 1000ML 0.9% SOD CHL USP POUR PLAS BTL

## (undated) DEVICE — Y-TYPE TUR/BLADDER IRRIGATION SET, REGULATING CLAMP

## (undated) DEVICE — BLADE CLIPPER GEN PURP NS